# Patient Record
Sex: FEMALE | Race: WHITE | NOT HISPANIC OR LATINO | Employment: OTHER | URBAN - METROPOLITAN AREA
[De-identification: names, ages, dates, MRNs, and addresses within clinical notes are randomized per-mention and may not be internally consistent; named-entity substitution may affect disease eponyms.]

---

## 2017-02-02 RX ORDER — LEVOTHYROXINE SODIUM 100 UG/1
TABLET ORAL
Qty: 90 TABLET | Refills: 0 | Status: SHIPPED | OUTPATIENT
Start: 2017-02-02 | End: 2017-04-25 | Stop reason: SDUPTHER

## 2017-04-25 ENCOUNTER — TELEPHONE (OUTPATIENT)
Dept: FAMILY MEDICINE | Facility: CLINIC | Age: 62
End: 2017-04-25

## 2017-04-25 ENCOUNTER — OFFICE VISIT (OUTPATIENT)
Dept: FAMILY MEDICINE | Facility: CLINIC | Age: 62
End: 2017-04-25
Payer: COMMERCIAL

## 2017-04-25 VITALS
HEIGHT: 69 IN | TEMPERATURE: 98 F | SYSTOLIC BLOOD PRESSURE: 120 MMHG | RESPIRATION RATE: 17 BRPM | BODY MASS INDEX: 22.36 KG/M2 | WEIGHT: 151 LBS | OXYGEN SATURATION: 95 % | DIASTOLIC BLOOD PRESSURE: 86 MMHG | HEART RATE: 73 BPM

## 2017-04-25 DIAGNOSIS — F33.0 MAJOR DEPRESSIVE DISORDER, RECURRENT, MILD: Primary | ICD-10-CM

## 2017-04-25 DIAGNOSIS — Z12.11 SCREEN FOR COLON CANCER: ICD-10-CM

## 2017-04-25 DIAGNOSIS — F51.01 PRIMARY INSOMNIA: ICD-10-CM

## 2017-04-25 DIAGNOSIS — E03.9 HYPOTHYROIDISM, UNSPECIFIED TYPE: ICD-10-CM

## 2017-04-25 DIAGNOSIS — R53.83 FATIGUE, UNSPECIFIED TYPE: ICD-10-CM

## 2017-04-25 DIAGNOSIS — E78.5 HYPERLIPIDEMIA, UNSPECIFIED HYPERLIPIDEMIA TYPE: ICD-10-CM

## 2017-04-25 DIAGNOSIS — Z79.890 POSTMENOPAUSAL HRT (HORMONE REPLACEMENT THERAPY): ICD-10-CM

## 2017-04-25 DIAGNOSIS — K21.9 GASTROESOPHAGEAL REFLUX DISEASE, ESOPHAGITIS PRESENCE NOT SPECIFIED: ICD-10-CM

## 2017-04-25 DIAGNOSIS — R06.02 SHORTNESS OF BREATH: ICD-10-CM

## 2017-04-25 DIAGNOSIS — E04.2 MULTINODULAR GOITER: ICD-10-CM

## 2017-04-25 DIAGNOSIS — D69.6 THROMBOCYTOPENIA: ICD-10-CM

## 2017-04-25 PROCEDURE — 93005 ELECTROCARDIOGRAM TRACING: CPT | Mod: S$GLB,,, | Performed by: INTERNAL MEDICINE

## 2017-04-25 PROCEDURE — 93010 ELECTROCARDIOGRAM REPORT: CPT | Mod: ,,, | Performed by: INTERNAL MEDICINE

## 2017-04-25 PROCEDURE — 1160F RVW MEDS BY RX/DR IN RCRD: CPT | Mod: S$GLB,,, | Performed by: INTERNAL MEDICINE

## 2017-04-25 PROCEDURE — 99214 OFFICE O/P EST MOD 30 MIN: CPT | Mod: S$GLB,,, | Performed by: INTERNAL MEDICINE

## 2017-04-25 RX ORDER — BUPROPION HYDROCHLORIDE 300 MG/1
300 TABLET ORAL DAILY
Qty: 90 TABLET | Refills: 3 | Status: SHIPPED | OUTPATIENT
Start: 2017-04-25 | End: 2017-09-19 | Stop reason: SDUPTHER

## 2017-04-25 RX ORDER — TRAZODONE HYDROCHLORIDE 50 MG/1
50 TABLET ORAL NIGHTLY
Qty: 30 TABLET | Refills: 11 | Status: SHIPPED | OUTPATIENT
Start: 2017-04-25 | End: 2017-05-15

## 2017-04-25 RX ORDER — LEVOTHYROXINE SODIUM 100 UG/1
100 TABLET ORAL DAILY
Qty: 90 TABLET | Refills: 3 | Status: SHIPPED | OUTPATIENT
Start: 2017-04-25 | End: 2017-09-19 | Stop reason: SDUPTHER

## 2017-04-25 RX ORDER — ESTRADIOL 0.07 MG/D
PATCH, EXTENDED RELEASE TRANSDERMAL
Qty: 24 PATCH | Refills: 0 | Status: SHIPPED | OUTPATIENT
Start: 2017-04-25 | End: 2017-06-29 | Stop reason: SDUPTHER

## 2017-04-25 NOTE — PROGRESS NOTES
Subjective:       Patient ID: Katerina Dickson is a 61 y.o. female.    Medication List with Changes/Refills   New Medications    TRAZODONE (DESYREL) 50 MG TABLET    Take 1 tablet (50 mg total) by mouth every evening.   Current Medications    DOXYLAMINE SUCCINATE (UNISOM ORAL)    Take 2-50 mg by mouth every evening. 2 tablets nightly    DUREZOL 0.05 % DROP OPHTHALMIC SOLUTION        LACTOBACILLUS RHAMNOSUS GG (CULTURELLE) 10 BILLION CELL CAPSULE    Take 1 capsule by mouth once daily.    VIVELLE-DOT 0.075 MG/24 HR    APPLY 1 PATCH EXTERNALLY TO THE SKIN 2 TIMES A WEEK   Changed and/or Refilled Medications    Modified Medication Previous Medication    BUPROPION (WELLBUTRIN XL) 300 MG 24 HR TABLET buPROPion (WELLBUTRIN XL) 300 MG 24 hr tablet       Take 1 tablet (300 mg total) by mouth once daily.    TAKE 1 TABLET BY MOUTH ONCE DAILY.    SYNTHROID 100 MCG TABLET SYNTHROID 100 mcg tablet       Take 1 tablet (100 mcg total) by mouth once daily.    TAKE 1 TABLET BY MOUTH EVERY DAY       Chief Complaint: Annual Exam and Medication Refill, synthroid, wellbutrin  She is here to f/u on chronic medical issues.      She has depression that has been controlled since 2005 on wellbutrin. She is doing well and tolerating medication well. She does have significant sleep issues and takes unisom x 2  every night to sleep. She has trouble falling asleep.      She has hypothyroid and is taking synthroid. Her last TSH was normal on 7/2016. She had a thyroid u/s done on 4/2016 that showed small nodules unchanged from u/s done on 2013.    She has elevated lipids but is not on treatment. Her last lipids were checked on 8/2016 were 257/113/60/174.     She is taking HRT patches since her RUI in 1995. She is managed by gyn.      She has GERD and has been taking prevacid for many years. She had her last EGD in 2007. She does complain of pain in her throat intermittently but no burning. No choking of food or trouble swallowing. Pain is located at  her upper sternum area. Nothing makes it worse or better. It is only intermittent and no known triggers. She also complain of feeling full and slow digestion. She is constipated and has bowel movement about every 3-4 days. She will have intermittent diarrhea if she waits too long to stool. No blood in stool. She feels like her food stays in her stomach for a very long time. No vomiting or nausea.      She does complain of a 10 lb weight gain over the last year despite careful eating and calorie counting. She has increased her exercising and still is having trouble loosing the weight. This concerns her greatly.      She does complain of shortness of breath.  She says she is very fatigued and gets winded easily over the last 6 months. She can not do as much as she could 6 months ago. No chest pain or palpitations. No swelling in her legs.  She is able to exercise but not as well as 6 months ago. No fevers.  No recent illnesses. No coughing or wheezing.     She was noted to have very low platelets that have now normalized. She was followed by hematology who feel that the platelets were artificially low due to clumping in the vial before processing. She f/u with hematology in June.       She has a history or chronic pancreatitis that required hospitalization x 3. Her last episode was in 2006 where she was hospitalized for one month and had gallbladder removed. She has not had episode since. She says she presented with fullness and weight gain then eventually pain. She would like to check her pancreas levels.      Colonoscopy---2012 repeat in 5 years  Mammogram----3/2016 neg  DEXA-----4/2016 nl  Pap-----RUI  Tdap---3/2014  Influenza vaccine---none  Shingles vaccine-----none  Lipids 9/2015-----216/110/57/137    Review of Systems   Constitutional: Positive for fatigue. Negative for appetite change, fever and unexpected weight change.   HENT: Negative for congestion, ear pain, hearing loss, sore throat and trouble  "swallowing.    Eyes: Negative for pain and visual disturbance.   Respiratory: Positive for shortness of breath. Negative for cough, chest tightness and wheezing.    Cardiovascular: Negative for chest pain, palpitations and leg swelling.   Gastrointestinal: Negative for abdominal pain, blood in stool, constipation, diarrhea, nausea and vomiting.   Endocrine: Negative for polyuria.   Genitourinary: Negative for dysuria and hematuria.   Musculoskeletal: Positive for arthralgias. Negative for back pain and myalgias.   Skin: Negative for rash.   Neurological: Negative for dizziness, weakness, numbness and headaches.   Hematological: Does not bruise/bleed easily.   Psychiatric/Behavioral: Positive for sleep disturbance. Negative for dysphoric mood and suicidal ideas. The patient is not nervous/anxious.        Objective:      Vitals:    04/25/17 1226   BP: 120/86   BP Location: Left arm   Patient Position: Sitting   BP Method: Manual   Pulse: 73   Resp: 17   Temp: 98.3 °F (36.8 °C)   TempSrc: Oral   SpO2: 95%   Weight: 68.5 kg (151 lb 0.2 oz)   Height: 5' 9" (1.753 m)     Body mass index is 22.3 kg/(m^2).  Physical Exam    General appearance: No acute distress, cooperative  Eyes: PERRL, EOMI, conjunctiva clear  Ears: normal external ear and pinna, tm clear without drainage, canals clear  Nose: Normal mucosa without drainage  Throat: no exudates or erythema, tonsils not enlarged  Mouth: no sores or lesions, moist mucous membranes, good dentition  Neck: FROM, soft, supple, no thyromegaly, no bruits  Lymph: no anterior or posterior cervical adenopathy  Heart::  Regular rate and rhythm, no murmur  Lung: Clear to ascultation bilaterally, no wheezing, no rales, no rhonchi, no distress  Abdomen: Soft, nontender, no distention, no hepatosplenomegaly, bowel sounds normal, no guarding, no rebound, no peritoneal signs  Skin: no rashes, no lesions  Extremities: no edema, no cyanosis  Neuro: CN 2-12 intact, 5/5 muscle strength upper " and lower extremity bilaterally, 2+ DTRs UE and LE bilaterally, normal gait, normal sensation  Peripheral pulses: 2+ pedal pulses bilaterally, good perfusion and color  Musculoskeletal: FROM, good strenth, no tenderness  Joint: normal appearance, no swelling, no warmth, no deformity in all joints    Assessment:       1. Major depressive disorder, recurrent, mild    2. Primary insomnia    3. Hypothyroidism, unspecified type    4. Multinodular goiter    5. Hyperlipidemia, unspecified hyperlipidemia type    6. Gastroesophageal reflux disease, esophagitis presence not specified    7. Thrombocytopenia    8. Shortness of breath    9. Fatigue, unspecified type    10. Screen for colon cancer        Plan:       Major depressive disorder, recurrent, mild  Good control on this regimen.   -     buPROPion (WELLBUTRIN XL) 300 MG 24 hr tablet; Take 1 tablet (300 mg total) by mouth once daily.  Dispense: 90 tablet; Refill: 3    Primary insomnia  Uncontrolled. ADvised to stop unisom OTC and start trazodone once at night.   -     trazodone (DESYREL) 50 MG tablet; Take 1 tablet (50 mg total) by mouth every evening.  Dispense: 30 tablet; Refill: 11    Hypothyroidism, unspecified type  Good control in the past and will check TSH today.   -     TSH; Future; Expected date: 4/25/17  -     SYNTHROID 100 mcg tablet; Take 1 tablet (100 mcg total) by mouth once daily.  Dispense: 90 tablet; Refill: 3    Multinodular goiter  STable and will check a surveillance u/s.   -      Soft Tissue Head Neck Thyroid; Future; Expected date: 4/25/17    Hyperlipidemia, unspecified hyperlipidemia type  Noted to be elevated but she does not want treatment at this time. Will continue to follow and recheck at next appt.     Gastroesophageal reflux disease, esophagitis presence not specified  Stable and controlled on prevacid.     Thrombocytopenia  Artificially low due to clumping in collection vial per hematology. REcheck today.   -     CBC auto differential;  Future; Expected date: 4/25/17    Shortness of breath with fatigue  ? Etiology---will check stress echo and EKG done today is reassuring with NSR, nl axis, nl intervals, no acute ST-Twave abn, no q waves.  Will check CXR.  Will check some baseline labs.  If all normal consider checking PFTs.    -     Exercise stress echo; Future  -     X-Ray Chest PA And Lateral; Future; Expected date: 4/25/17  -     IN OFFICE EKG 12-LEAD (to [place])  -     Exercise stress echo; Future  -     Comprehensive metabolic panel; Future; Expected date: 4/25/17  -     Protein electrophoresis, serum; Future; Expected date: 4/25/17  -     PROTEIN ELECTROPHORESIS, URINE; Future    Screen for colon cancer  -     Case request GI: COLONOSCOPY    Return in about 6 months (around 10/25/2017) for chronic medical issues.

## 2017-04-25 NOTE — MR AVS SNAPSHOT
[facility]  [address]  [place] [address]  Phone: [phone]  Fax: [phone]                  Katerina Dickson   2017 12:00 PM   Office Visit    Description:  Female : 1955   Provider:  Kaitlyn Butler DO   Department:  [facility]           Reason for Visit     Annual Exam     Medication Refill, synthroid, wellbutrin           Diagnoses this Visit        Comments    Major depressive disorder, recurrent, mild    -  Primary     Primary insomnia         Hypothyroidism, unspecified type         Gastroesophageal reflux disease, esophagitis presence not specified         Thrombocytopenia         Cervical radiculopathy         Shortness of breath         Screen for colon cancer         Fatigue, unspecified type         Multinodular goiter                To Do List           Future Appointments        Provider Department Dept Phone    2017 9:30 AM [facility] US3 [facility] [phone]    2017 10:15 AM LAB, [place] [facility] [phone]    2017 10:30 AM [facility] XR2 [facility] [phone]    2017 1:45 PM ECHO, [facility] [phone]    2017 11:20 AM Carla Moore MD [facility] - [phone]      Goals (5 Years of Data)     None       These Medications        Disp Refills Start End    trazodone (DESYREL) 50 MG tablet 30 tablet 11 2017    Take 1 tablet (50 mg total) by mouth every evening. - Oral    Pharmacy: [facility] [address] [place] [address] [id number] [place] [address] [address] AT [facility] [address] Ph #: [phone]       buPROPion (WELLBUTRIN XL) 300 MG 24 hr tablet 90 tablet 3 2017     Take 1 tablet (300 mg total) by mouth once daily. - Oral    Pharmacy: [facility] [address] [place] [address] [id number] [place] [address] [address] AT [facility] [address] Ph #: [phone]       SYNTHROID 100 mcg tablet 90  tablet 3 4/25/2017     Take 1 tablet (100 mcg total) by mouth once daily. - Oral    Pharmacy: [facility] [address] [place] [address] [address] AT [facility] [address] Ph #: [phone]         [facility]     [facility] On Call Nurse Care Line - 24/7 Assistance  Unless otherwise directed by your provider, please contact [facility], our nurse care line that is available for 24/7 assistance.     Registered nurses in the [facility] provide: appointment scheduling, clinical advisement, health education, and other advisory services.  Call: [phone] (toll free)               Medications           Message regarding Medications     Verify the changes and/or additions to your medication regime listed below are the same as discussed with your clinician today.  If any of these changes or additions are incorrect, please notify your healthcare provider.        START taking these NEW medications        Refills    trazodone (DESYREL) 50 MG tablet 11    Sig: Take 1 tablet (50 mg total) by mouth every evening.    Class: Normal    Route: Oral      CHANGE how you are taking these medications     Start Taking Instead of    buPROPion (WELLBUTRIN XL) 300 MG 24 hr tablet buPROPion (WELLBUTRIN XL) 300 MG 24 hr tablet    Dosage:  Take 1 tablet (300 mg total) by mouth once daily. Dosage:  TAKE 1 TABLET BY MOUTH ONCE DAILY.    Reason for Change:  Reorder     SYNTHROID 100 mcg tablet SYNTHROID 100 mcg tablet    Dosage:  Take 1 tablet (100 mcg total) by mouth once daily. Dosage:  TAKE 1 TABLET BY MOUTH EVERY DAY    Reason for Change:  Reorder            Verify that the below list of medications is an accurate representation of the medications you are currently taking.  If none reported, the list may be blank. If incorrect, please contact your healthcare provider. Carry this list with you in case of emergency.           Current Medications     buPROPion (WELLBUTRIN XL) 300 MG 24 hr tablet Take 1  "tablet (300 mg total) by mouth once daily.    DOXYLAMINE SUCCINATE (UNISOM ORAL) Take 2-50 mg by mouth every evening. 2 tablets nightly    DUREZOL 0.05 % Drop ophthalmic solution     Lactobacillus rhamnosus GG (CULTURELLE) 10 billion cell capsule Take 1 capsule by mouth once daily.    SYNTHROID 100 mcg tablet Take 1 tablet (100 mcg total) by mouth once daily.    VIVELLE-DOT 0.075 mg/24 hr APPLY 1 PATCH EXTERNALLY TO THE SKIN 2 TIMES A WEEK    trazodone (DESYREL) 50 MG tablet Take 1 tablet (50 mg total) by mouth every evening.           Clinical Reference Information           Your Vitals Were     BP Pulse Temp Resp Height Weight    120/86 (BP Location: Left arm, Patient Position: Sitting, BP Method: Manual) 73 98.3 °F (36.8 °C) (Oral) 17 5' 9" (1.753 m) 68.5 kg (151 lb 0.2 oz)    SpO2 BMI             95% 22.3 kg/m2         Blood Pressure          Most Recent Value    BP  120/86      Allergies as of 4/25/2017     No Known Allergies      Immunizations Administered on Date of Encounter - 4/25/2017     None      Orders Placed During Today's Visit      Normal Orders This Visit    Case request GI: COLONOSCOPY     IN OFFICE EKG 12-LEAD (to [place])     Future Labs/Procedures Expected by Expires    CBC auto differential  4/25/2017 4/26/2018    Comprehensive metabolic panel  4/25/2017 4/26/2018    Protein electrophoresis, serum  4/25/2017 6/24/2018    TSH  4/25/2017 4/26/2018    US Soft Tissue Head Neck Thyroid  4/25/2017 4/25/2018    X-Ray Chest PA And Lateral  4/25/2017 4/25/2018    Exercise stress echo  As directed 4/25/2018    PROTEIN ELECTROPHORESIS, URINE  As directed 4/25/2018      [facility] Sign-Up     Activating your [facility] account is as easy as 1-2-3!     1) Visit [email or web address], select Sign Up Now, enter this activation code and your date of birth, then select Next.  Activation code not generated  Current Patient Portal Status: Account disabled      2) Create a username and password to use when you visit [facility] " in the future and select a security question in case you lose your password and select Next.    3) Enter your e-mail address and click Sign Up!    Additional Information  If you have questions, please e-mail [email or web address] or call [phone] to talk to our [facility] staff. Remember, [facility] is NOT to be used for urgent needs. For medical emergencies, dial 911.         Language Assistance Services     ATTENTION: Language assistance services are available, free of charge. Please call [phone].      ATENCIÓN: Si habla español, tiene a morin disposición servicios gratuitos de asistencia lingüística. Llame al [phone].     CHÚ Ý: N?u b?n nói Ti?ng Vi?t, có các d?ch v? h? tr? ngôn ng? mi?n phí dành cho b?n. G?i s? [phone].         [facility] complies with applicable Federal civil rights laws and does not discriminate on the basis of race, color, national origin, age, disability, or sex.

## 2017-04-27 NOTE — TELEPHONE ENCOUNTER
Attempted to reach patient via contact numbers provided, no answer. LM including information regarding OTC aquaphor BID to area, included contact information for return call/ questions.

## 2017-05-01 ENCOUNTER — TELEPHONE (OUTPATIENT)
Dept: FAMILY MEDICINE | Facility: CLINIC | Age: 62
End: 2017-05-01

## 2017-05-01 NOTE — TELEPHONE ENCOUNTER
Financial Call Center has not been able to contact patient.        Pt has a responsibility  due $340.00__.       Is this procedure medically urgent or non-medically ?       Please respond via In-basket or contact [facility] @ [phone].      Regarding echo

## 2017-05-01 NOTE — TELEPHONE ENCOUNTER
In regards to below message, Dr. Butler clarified procedure IS medically urgent pertaining to patients ECHO, disregard ultrasound information. Notified April with [facility] of medical urgency, verbalized understanding.

## 2017-05-01 NOTE — TELEPHONE ENCOUNTER
This is a surveillance u/s to check her multiple nodules. This could wait to be done in 2018.

## 2017-05-04 ENCOUNTER — HOSPITAL ENCOUNTER (OUTPATIENT)
Dept: RADIOLOGY | Facility: HOSPITAL | Age: 62
Discharge: HOME | End: 2017-05-04
Attending: INTERNAL MEDICINE
Payer: COMMERCIAL

## 2017-05-04 ENCOUNTER — CLINICAL SUPPORT (OUTPATIENT)
Dept: CARDIOLOGY | Facility: CLINIC | Age: 62
End: 2017-05-04
Payer: COMMERCIAL

## 2017-05-04 DIAGNOSIS — R06.02 SHORTNESS OF BREATH: ICD-10-CM

## 2017-05-04 DIAGNOSIS — R53.83 FATIGUE, UNSPECIFIED TYPE: ICD-10-CM

## 2017-05-04 DIAGNOSIS — E04.2 MULTINODULAR GOITER: ICD-10-CM

## 2017-05-04 PROCEDURE — 93351 STRESS TTE COMPLETE: CPT | Mod: S$GLB,,, | Performed by: INTERNAL MEDICINE

## 2017-05-04 PROCEDURE — 76536 US EXAM OF HEAD AND NECK: CPT | Mod: TC,PO

## 2017-05-04 PROCEDURE — 76536 US EXAM OF HEAD AND NECK: CPT | Mod: 26,,, | Performed by: RADIOLOGY

## 2017-05-04 PROCEDURE — 71020 XR CHEST PA AND LATERAL: CPT | Mod: TC,PO

## 2017-05-04 PROCEDURE — 93321 DOPPLER ECHO F-UP/LMTD STD: CPT | Mod: S$GLB,,, | Performed by: INTERNAL MEDICINE

## 2017-05-04 PROCEDURE — 71020 XR CHEST PA AND LATERAL: CPT | Mod: 26,,, | Performed by: RADIOLOGY

## 2017-05-05 LAB
DIASTOLIC DYSFUNCTION: NO
MITRAL VALVE MOBILITY: NORMAL
RETIRED EF AND QEF - SEE NOTES: 55 (ref 55–65)

## 2017-05-12 ENCOUNTER — TELEPHONE (OUTPATIENT)
Dept: FAMILY MEDICINE | Facility: CLINIC | Age: 62
End: 2017-05-12

## 2017-05-12 DIAGNOSIS — R06.09 DOE (DYSPNEA ON EXERTION): Primary | ICD-10-CM

## 2017-05-12 DIAGNOSIS — R94.39 ABNORMAL STRESS ECG: ICD-10-CM

## 2017-05-12 NOTE — TELEPHONE ENCOUNTER
Appt scheduled, patient aware. States trazodone is not helping with sleep issues, patient goes to sleep, wakes up and can't fall back asleep. Please advise if any additional instructions.

## 2017-05-12 NOTE — TELEPHONE ENCOUNTER
I called her with the results of her labs and imaging. All look reassuring except for her stress test that was positive on EKG portion but negative on echo portion.    Will refer to cardiology.  Please schedule and call her with appt.     Thanks.

## 2017-05-15 ENCOUNTER — TELEPHONE (OUTPATIENT)
Dept: CARDIOLOGY | Facility: CLINIC | Age: 62
End: 2017-05-15

## 2017-05-15 ENCOUNTER — TELEPHONE (OUTPATIENT)
Dept: GASTROENTEROLOGY | Facility: CLINIC | Age: 62
End: 2017-05-15

## 2017-05-15 ENCOUNTER — OFFICE VISIT (OUTPATIENT)
Dept: CARDIOLOGY | Facility: CLINIC | Age: 62
End: 2017-05-15
Payer: COMMERCIAL

## 2017-05-15 VITALS
DIASTOLIC BLOOD PRESSURE: 74 MMHG | BODY MASS INDEX: 22.08 KG/M2 | SYSTOLIC BLOOD PRESSURE: 121 MMHG | WEIGHT: 149.06 LBS | HEART RATE: 66 BPM | HEIGHT: 69 IN

## 2017-05-15 DIAGNOSIS — Z01.812 PRE-PROCEDURE LAB EXAM: Primary | ICD-10-CM

## 2017-05-15 DIAGNOSIS — D69.6 THROMBOCYTOPENIA: ICD-10-CM

## 2017-05-15 DIAGNOSIS — R94.39 ABNORMAL STRESS TEST: ICD-10-CM

## 2017-05-15 DIAGNOSIS — R06.09 DOE (DYSPNEA ON EXERTION): ICD-10-CM

## 2017-05-15 DIAGNOSIS — Z79.01 CHRONIC ANTICOAGULATION: ICD-10-CM

## 2017-05-15 PROCEDURE — 99999 PR PBB SHADOW E&M-EST. PATIENT-LVL III: CPT | Mod: PBBFAC,,, | Performed by: INTERNAL MEDICINE

## 2017-05-15 PROCEDURE — 99204 OFFICE O/P NEW MOD 45 MIN: CPT | Mod: S$GLB,,, | Performed by: INTERNAL MEDICINE

## 2017-05-15 PROCEDURE — 1160F RVW MEDS BY RX/DR IN RCRD: CPT | Mod: S$GLB,,, | Performed by: INTERNAL MEDICINE

## 2017-05-15 RX ORDER — NORTRIPTYLINE HYDROCHLORIDE 10 MG/1
CAPSULE ORAL
Qty: 60 CAPSULE | Refills: 11 | Status: SHIPPED | OUTPATIENT
Start: 2017-05-15 | End: 2017-06-29

## 2017-05-15 NOTE — TELEPHONE ENCOUNTER
Please call her and let her know that I did see cardiology's note and know she is getting an angiogram.     Also I want her to stop trazodone.     Start nortriptyline 1 tab qhs x 1 week then increase to 2 tab qhs.     Thanks.

## 2017-05-15 NOTE — PROGRESS NOTES
Subjective:    Patient ID:  Katerina Dickson is a 61 y.o. female who presents for evaluation of new pt (new pt); Abnormal Stress Test; and Shortness of Breath      HPI 60 yo WF who has been exercising for several months but recently has had change in exercise tolerance. She notices a definite change with SOB to the point she quit exercising. Noticed SOB walking up the stairs to the office today. Denies palpitations, weak spells, and syncope. Had stress test read as below. Reviewed EKG and feel the EKG was definitely abnormal.    At peak exercise, EKG revealed < 1mm of downsloping ST segment depression in the inferolateral leads.     EKG Conclusions:    1. The EKG portion of this study is suspicious for ischemia at a moderate workload, and peak heart rate of 133 bpm (87% of predicted).   2. Exercise capacity is average.   3. Blood pressure response to exercise was normal (Presenting BP: 122/83 Peak BP: 140/83).   4. No significant arrhythmias were present.   5. There were no symptoms of chest discomfort or significant dyspnea throughout the protocol.   6. The Duke treadmill score was 4 suggesting     No exercise induced wall motion abnormalities were identified.       CONCLUSIONS     1 - Normal left ventricular systolic function (EF 55-60%).     2 - Normal left ventricular diastolic function.     No evidence of stress induced myocardial ischemia.         This document has been electronically    SIGNED BY: Ilan De MD On: 05/05/2017 07:50    Review of Systems   Constitution: Negative for decreased appetite, fever, weakness, malaise/fatigue, weight gain and weight loss.   HENT: Negative for headaches, hearing loss and nosebleeds.    Eyes: Negative for visual disturbance.   Cardiovascular: Positive for dyspnea on exertion. Negative for chest pain, claudication, cyanosis, irregular heartbeat, leg swelling, near-syncope, orthopnea, palpitations, paroxysmal nocturnal dyspnea and syncope.   Respiratory: Positive for  "shortness of breath. Negative for cough, hemoptysis, sleep disturbances due to breathing, snoring and wheezing.    Endocrine: Negative for cold intolerance, heat intolerance, polydipsia and polyuria.   Hematologic/Lymphatic: Negative for adenopathy and bleeding problem. Does not bruise/bleed easily.   Skin: Negative for color change, itching, poor wound healing, rash and suspicious lesions.   Musculoskeletal: Negative for arthritis, back pain, falls, joint pain, joint swelling, muscle cramps, muscle weakness and myalgias.   Gastrointestinal: Negative for bloating, abdominal pain, change in bowel habit, constipation, flatus, heartburn, hematemesis, hematochezia, hemorrhoids, jaundice, melena, nausea and vomiting.   Genitourinary: Negative for bladder incontinence, decreased libido, frequency, hematuria, hesitancy and urgency.   Neurological: Negative for brief paralysis, difficulty with concentration, excessive daytime sleepiness, dizziness, focal weakness, light-headedness, loss of balance, numbness and vertigo.   Psychiatric/Behavioral: Negative for altered mental status, depression and memory loss. The patient does not have insomnia and is not nervous/anxious.    Allergic/Immunologic: Negative for environmental allergies, hives and persistent infections.        Objective:    Physical Exam   Constitutional: She is oriented to person, place, and time. She appears well-developed and well-nourished. No distress.   /74  Pulse 66  Ht 5' 9" (1.753 m)  Wt 67.6 kg (149 lb 0.5 oz)  BMI 22.01 kg/m2     HENT:   Head: Normocephalic and atraumatic.   Right Ear: External ear normal.   Left Ear: External ear normal.   Nose: Nose normal.   Mouth/Throat: Oropharynx is clear and moist.   Eyes: Conjunctivae, EOM and lids are normal. Pupils are equal, round, and reactive to light. Right eye exhibits no discharge. Left eye exhibits no discharge. Right conjunctiva has no hemorrhage. No scleral icterus.   Neck: Normal range of " motion. Neck supple. No JVD present. No tracheal deviation present. No thyromegaly present.   Cardiovascular: Normal rate, regular rhythm, S1 normal, S2 normal, normal heart sounds and intact distal pulses.  Exam reveals no gallop and no friction rub.    No murmur heard.  Pulses:       Carotid pulses are 2+ on the right side, and 2+ on the left side.       Radial pulses are 2+ on the right side, and 2+ on the left side.        Femoral pulses are 2+ on the right side, and 2+ on the left side.       Popliteal pulses are 2+ on the right side, and 2+ on the left side.        Dorsalis pedis pulses are 2+ on the right side, and 2+ on the left side.        Posterior tibial pulses are 2+ on the right side, and 2+ on the left side.   Pulmonary/Chest: Effort normal and breath sounds normal. No respiratory distress. She has no wheezes. She has no rales. She exhibits no tenderness. Breasts are symmetrical.   Abdominal: Soft. Bowel sounds are normal. She exhibits no distension and no mass. There is no hepatosplenomegaly or hepatomegaly. There is no tenderness. There is no rebound and no guarding.   Musculoskeletal: Normal range of motion. She exhibits no edema or tenderness.   Lymphadenopathy:     She has no cervical adenopathy.   Neurological: She is alert and oriented to person, place, and time. She has normal reflexes. No cranial nerve deficit. Coordination normal.   Skin: Skin is warm and dry. No rash noted. She is not diaphoretic. No erythema. No pallor.   Psychiatric: She has a normal mood and affect. Her speech is normal and behavior is normal. Judgment and thought content normal. Cognition and memory are normal.   Nursing note and vitals reviewed.        Assessment:       1. Abnormal stress test    2. YO (dyspnea on exertion)         Plan:     Discussed alternatives with patient including alternative stress test vs cath and she opts for cath. With exertional symptoms and abnormal EKG feel this is reasonable   Will set  up cath with Dr Montes De Oca   .No orders of the defined types were placed in this encounter.    Return in about 3 months (around 8/15/2017).

## 2017-05-15 NOTE — TELEPHONE ENCOUNTER
Please reach out to patient to reschedule colonoscopy. Patient had abnormal stress test and has been scheduled for an angiogram on Monday, May 22nd.     Please msg me back that you got this msg. Patient is aware of scheduled angiogram and needs you to reschedule colonoscopy.   Thank you    Marcy England LPN

## 2017-05-15 NOTE — TELEPHONE ENCOUNTER
Spoke with Dr Gamble's office. They are aware pt has LHC scheduled & they need to reschedule colonoscopy.

## 2017-05-15 NOTE — TELEPHONE ENCOUNTER
----- Message from Angela Rooney sent at 5/15/2017  3:48 PM CDT -----  Contact: self [phone]  Call placed to pod. Patient returned your call, please call back.  Thank you!

## 2017-05-15 NOTE — PATIENT INSTRUCTIONS
Angiogram    Arrive for procedure at: [facility], Main Entrance, 1st Floor Admit Desk at 7am on Friday, June 30, 2017. Procedure is at 9:00 am.    You will receive a phone call from [facility] Pre-Op Department with further instructions prior to your scheduled procedure.    Notify the nurse if you are ALLERGIC TO IODINE.    FASTING: You MAY NOT have anything to eat or drink AFTER MIDNIGHT the day before your procedure. If your procedure is scheduled in the afternoon, you may have a LIGHT BREAKFAST 6-8 hours prior to your procedure.  For example: Two slices of toast; black coffee or black tea.    MEDICATIONS: You may take your regular morning medications with water. If there are any medications that you should not take, you will be instructed to hold them for that morning.    If you take any of the following:    ? CARDIOLOGY PRE-PROCEDURE MEDICATION ORDERS:  ** Please hold any medications that are checked below:    HOLD   # OF DAYS TO HOLD  ? Coumadin   Consult with Coumadin Clinic   ? Xarelto    _DAY BEFORE & DAY OF_  ? Pradaxa  _ DAY BEFORE & DAY OF _  ? Eliquis   _ DAY BEFORE & DAY OF _  ? Metformin    Day before procedure & morning of procedure  ? Short acting insulin   Morning of procedure    You can CONTINUE the Following Medications if you are taking them:     ? Plavix      ? Effient     ? Aspirin      WHAT TO EXPECT:    How long will the procedure take?  The procedure will take an average of 1 - 2 hours to perform.  After the procedure, you will need to lay flat for around 4 - 6 hours to minimize bleeding from the puncture site. If the wrist is accessed you will need to keep your arm still as instructed by the nurse.    When can I go home?  You may be able to be discharged home that same afternoon if there were no complications.  If you have one of the following: balloon; stent; pacemaker or defibrillator procedures, you may spend one night for observation.  Your doctor  will determine your discharge based upon your progress.  The results of your procedure will be discussed with you before you are discharged.  Any further testing or procedures will be scheduled for you either before you leave or you will be instructed to call for a future appointment.      TRANSPORTATION:  PLEASE ARRANGE TO HAVE SOMEONE DRIVE YOU HOME FOLLOWING YOUR PROCEDURE, YOU WILL NOT BE ALLOWED TO DRIVE.

## 2017-05-15 NOTE — LETTER
May 15, 2017      Kaitlyn Butler DO  [address] [address]  [address]  [facility] [address]           [facility]  [address] [facility] [address] [place] [address]  Phone: [phone]          Patient: Katerina Dickson   MR Number: [id number]   YOB: 1955   Date of Visit: 5/15/2017       Dear Dr. Kaitlyn Butler:    Thank you for referring Katerina Dickson to me for evaluation. Attached you will find relevant portions of my assessment and plan of care.    If you have questions, please do not hesitate to call me. I look forward to following Katerina Dickson along with you.    Sincerely,    Mick Ng Jr., MD    Enclosure  CC:  No Recipients    If you would like to receive this communication electronically, please contact [email or web address] or [phone] to request more information on [employer] Link access.    For providers and/or their staff who would like to refer a patient to [facility], please contact us through our one-stop-shop provider referral line, [facility], at [phone].    If you feel you have received this communication in error or would no longer like to receive these types of communications, please e-mail [email or web address]

## 2017-05-15 NOTE — MR AVS SNAPSHOT
[facility]  [address] [facility] [address] [place] [address]  Phone: [phone]                  Katerina Dickson   5/15/2017 11:30 AM   Office Visit    Description:  Female : 1955   Provider:  Mick Ng Jr., MD   Department:  [facility]           Reason for Visit     new pt     Abnormal Stress Test     Shortness of Breath           Diagnoses this Visit        Comments    Abnormal stress test         YO (dyspnea on exertion)                To Do List           Future Appointments        Provider Department Dept Phone    2017 11:20 AM Carla Moore MD [facility] [phone]    2017 9:30 AM LAB, [facility] DRAW STATION [facility] - Laboratory [phone]    2017 10:20 AM Rona Rao MD [facility] - Hematology [phone]    10/25/2017 11:00 AM Kaitlyn Butler DO [facility] [phone]      Your Future Surgeries/Procedures     May 22, 2017   Surgery with Pankaj Shah MD   [facility] ([facility] [place]    [address] [facility] [address] [place] [address] [phone]              Goals (5 Years of Data)     None      Follow-Up and Disposition     Return in about 3 months (around 8/15/2017).    Follow-up and Disposition History      [facility]     [facility] Nurse Care Line -  Assistance  Unless otherwise directed by your provider, please contact [facility], our nurse care line that is available for  assistance.     Registered nurses in the [facility] provide: appointment scheduling, clinical advisement, health education, and other advisory services.  Call: [phone] (toll free)               Medications           Message regarding Medications     Verify the changes and/or additions to your medication regime listed below are the same as discussed with your clinician today.  If any of these changes or additions are incorrect, please notify your healthcare  "provider.             Verify that the below list of medications is an accurate representation of the medications you are currently taking.  If none reported, the list may be blank. If incorrect, please contact your healthcare provider. Carry this list with you in case of emergency.           Current Medications     buPROPion (WELLBUTRIN XL) 300 MG 24 hr tablet Take 1 tablet (300 mg total) by mouth once daily.    DOXYLAMINE SUCCINATE (UNISOM ORAL) Take 2-50 mg by mouth every evening. 2 tablets nightly    DUREZOL 0.05 % Drop ophthalmic solution     Lactobacillus rhamnosus GG (CULTURELLE) 10 billion cell capsule Take 1 capsule by mouth once daily.    SYNTHROID 100 mcg tablet Take 1 tablet (100 mcg total) by mouth once daily.    trazodone (DESYREL) 50 MG tablet Take 1 tablet (50 mg total) by mouth every evening.    VIVELLE-DOT 0.075 mg/24 hr APPLY 1 PATCH EXTERNALLY TO THE SKIN 2 TIMES A WEEK           Clinical Reference Information           Your Vitals Were     BP Pulse Height Weight BMI    121/74 66 5' 9" (1.753 m) 67.6 kg (149 lb 0.5 oz) 22.01 kg/m2      Blood Pressure          Most Recent Value    BP  121/74      Allergies as of 5/15/2017     No Known Allergies      Immunizations Administered on Date of Encounter - 5/15/2017     None      Instructions    Angiogram    Arrive for procedure at: [facility], Main Entrance, 1st Floor Admit Desk at 8am on Monday, May 22, 2017. Procedure is at 10:00 am.    You will receive a phone call from [facility] Pre-Op Department with further instructions prior to your scheduled procedure.    Notify the nurse if you are ALLERGIC TO IODINE.    FASTING: You MAY NOT have anything to eat or drink AFTER MIDNIGHT the day before your procedure. If your procedure is scheduled in the afternoon, you may have a LIGHT BREAKFAST 6-8 hours prior to your procedure.  For example: Two slices of toast; black coffee or black tea.    MEDICATIONS: You may take your " regular morning medications with water. If there are any medications that you should not take, you will be instructed to hold them for that morning.    ? CARDIOLOGY PRE-PROCEDURE MEDICATION ORDERS:  ** Please hold any medications that are checked below:    HOLD   # OF DAYS TO HOLD  ? Coumadin   Consult with Coumadin Clinic   ? Xarelto    _DAY BEFORE & DAY OF_  ? Pradaxa  _ DAY BEFORE & DAY OF _  ? Eliquis   _ DAY BEFORE & DAY OF _  ? Metformin    Day before procedure & morning of procedure  ? Short acting insulin   Morning of procedure    CONTINUE the Following Medications   ? Plavix      ? Effient     ? Aspirin        WHAT TO EXPECT:    How long will the procedure take?  The procedure will take an average of 1 - 2 hours to perform.  After the procedure, you will need to lay flat for around 4 - 6 hours to minimize bleeding from the puncture site. If the wrist is accessed you will need to keep your arm still as instructed by the nurse.    When can I go home?  You may be able to be discharged home that same afternoon if there were no complications.  If you have one of the following: balloon; stent; pacemaker or defibrillator procedures, you may spend one night for observation.  Your doctor will determine your discharge based upon your progress.  The results of your procedure will be discussed with you before you are discharged.  Any further testing or procedures will be scheduled for you either before you leave or you will be instructed to call for a future appointment.      TRANSPORTATION:  PLEASE ARRANGE TO HAVE SOMEONE DRIVE YOU HOME FOLLOWING YOUR PROCEDURE, YOU WILL NOT BE ALLOWED TO DRIVE.             Language Assistance Services     ATTENTION: Language assistance services are available, free of charge. Please call [phone].      ATENCIÓN: Si habla weston, tiene a morin disposición servicios gratuitos de asistencia lingüística. Alicia al [phone].     CHÚ Ý: N?u b?n nói Ti?ng Vi?t, có các d?ch v? h?  tr? andres mckeon? mi?n phí dành cho b?n. G?i s? [phone].         [facility] complies with applicable Federal civil rights laws and does not discriminate on the basis of race, color, national origin, age, disability, or sex.

## 2017-05-16 ENCOUNTER — TELEPHONE (OUTPATIENT)
Dept: CARDIOLOGY | Facility: CLINIC | Age: 62
End: 2017-05-16

## 2017-05-16 NOTE — TELEPHONE ENCOUNTER
Spoke with patient. States she cannot afford to do the angiogram right now.  Insurance will pay the Doctor's fees but not hospital bill at $14,000 +.    She is cancelling the angiogram until she can work something out.  Insurance will pay hospital bill if pt actually has a heart attack.

## 2017-05-16 NOTE — TELEPHONE ENCOUNTER
----- Message from Danilo Castillo sent at 5/16/2017  3:27 PM CDT -----  Contact: Patient  Patient states to please cancel the procedure scheduled for 05/22/2017 and she will call back to re-schedule.  Patient states that she is having problems with the insurance.  Any questions, please call [phone].  Thank you

## 2017-05-17 ENCOUNTER — TELEPHONE (OUTPATIENT)
Dept: FAMILY MEDICINE | Facility: CLINIC | Age: 62
End: 2017-05-17

## 2017-05-17 NOTE — TELEPHONE ENCOUNTER
----- Message from Danilo Castillo sent at 5/16/2017  3:25 PM CDT -----  Contact: Patient  Patient states to please cancel the procedure for the colonoscopy because of insurance problems.  And will call back to re-schedule.  Any questions, please call [phone].  Thank you

## 2017-05-17 NOTE — TELEPHONE ENCOUNTER
See pt's msg.  Pt requesting to cancel the colonoscopy due to insurance issue.  Please take pt off your schedule.

## 2017-06-20 ENCOUNTER — TELEPHONE (OUTPATIENT)
Dept: FAMILY MEDICINE | Facility: CLINIC | Age: 62
End: 2017-06-20

## 2017-06-20 NOTE — TELEPHONE ENCOUNTER
Pt stated she is having headaches due to her SOB.  Her angiogram was canceled due to her insurance.  She now has new insurance and ready to schedule the Angiogram.   Please call pt and [facility].

## 2017-06-20 NOTE — TELEPHONE ENCOUNTER
----- Message from John Frye sent at 6/20/2017 10:52 AM CDT -----  Contact: Self  [phone]  Patient needs an earlier appointment to see the doctor for headaches. Thanks!

## 2017-06-23 ENCOUNTER — TELEPHONE (OUTPATIENT)
Dept: CARDIOLOGY | Facility: CLINIC | Age: 62
End: 2017-06-23

## 2017-06-23 ENCOUNTER — PATIENT MESSAGE (OUTPATIENT)
Dept: CARDIOLOGY | Facility: CLINIC | Age: 62
End: 2017-06-23

## 2017-06-23 DIAGNOSIS — E03.9 HYPOTHYROIDISM, UNSPECIFIED TYPE: ICD-10-CM

## 2017-06-23 DIAGNOSIS — R06.09 DOE (DYSPNEA ON EXERTION): Primary | ICD-10-CM

## 2017-06-23 DIAGNOSIS — E78.5 HYPERLIPIDEMIA, UNSPECIFIED HYPERLIPIDEMIA TYPE: ICD-10-CM

## 2017-06-23 DIAGNOSIS — D69.6 THROMBOCYTOPENIA: ICD-10-CM

## 2017-06-23 DIAGNOSIS — R94.39 ABNORMAL STRESS TEST: ICD-10-CM

## 2017-06-23 DIAGNOSIS — Z79.01 CHRONIC ANTICOAGULATION: ICD-10-CM

## 2017-06-23 DIAGNOSIS — Z01.812 PRE-PROCEDURE LAB EXAM: ICD-10-CM

## 2017-06-23 NOTE — TELEPHONE ENCOUNTER
Spoke with patient and scheduled appt for angiogram for Friday, June 30th, at 9:am. Pre-procedure instructions sent to pt's [facility] pt portal for printing.  Pt aware labs will be drawn at hospital same day.

## 2017-06-29 ENCOUNTER — HOSPITAL ENCOUNTER (OUTPATIENT)
Dept: RADIOLOGY | Facility: HOSPITAL | Age: 62
Discharge: HOME | End: 2017-06-29
Attending: OBSTETRICS & GYNECOLOGY
Payer: COMMERCIAL

## 2017-06-29 ENCOUNTER — OFFICE VISIT (OUTPATIENT)
Dept: OBSTETRICS AND GYNECOLOGY | Facility: CLINIC | Age: 62
End: 2017-06-29
Payer: COMMERCIAL

## 2017-06-29 VITALS — HEIGHT: 69 IN | WEIGHT: 147 LBS | BODY MASS INDEX: 21.77 KG/M2

## 2017-06-29 VITALS
WEIGHT: 147.94 LBS | HEIGHT: 69 IN | SYSTOLIC BLOOD PRESSURE: 118 MMHG | DIASTOLIC BLOOD PRESSURE: 64 MMHG | BODY MASS INDEX: 21.91 KG/M2

## 2017-06-29 DIAGNOSIS — Z01.419 ENCOUNTER FOR GYNECOLOGICAL EXAMINATION WITHOUT ABNORMAL FINDING: Primary | ICD-10-CM

## 2017-06-29 DIAGNOSIS — Z12.31 VISIT FOR SCREENING MAMMOGRAM: ICD-10-CM

## 2017-06-29 DIAGNOSIS — Z79.890 POSTMENOPAUSAL HRT (HORMONE REPLACEMENT THERAPY): ICD-10-CM

## 2017-06-29 PROCEDURE — 99396 PREV VISIT EST AGE 40-64: CPT | Mod: S$GLB,,, | Performed by: OBSTETRICS & GYNECOLOGY

## 2017-06-29 PROCEDURE — 77067 SCR MAMMO BI INCL CAD: CPT | Mod: TC

## 2017-06-29 PROCEDURE — 99999 PR PBB SHADOW E&M-EST. PATIENT-LVL III: CPT | Mod: PBBFAC,,, | Performed by: OBSTETRICS & GYNECOLOGY

## 2017-06-29 PROCEDURE — 77063 BREAST TOMOSYNTHESIS BI: CPT | Mod: 26,,, | Performed by: RADIOLOGY

## 2017-06-29 PROCEDURE — 77067 SCR MAMMO BI INCL CAD: CPT | Mod: 26,,, | Performed by: RADIOLOGY

## 2017-06-29 RX ORDER — ESTRADIOL 0.07 MG/D
FILM, EXTENDED RELEASE TRANSDERMAL
Qty: 24 PATCH | Refills: 3 | Status: SHIPPED | OUTPATIENT
Start: 2017-06-29 | End: 2018-09-27 | Stop reason: SDUPTHER

## 2017-06-29 NOTE — PROGRESS NOTES
Chief Complaint   Patient presents with    Well Woman     no problems     Medication Management     HRT states she would like a paper RX for this medication.      History of Present Illness: Katerina Dickson is a 61 y.o. female that presents today 2017 for well gyn visit.    Past Medical History:   Diagnosis Date    GERD (gastroesophageal reflux disease)     Hashimoto's thyroiditis     Hypothyroidism     Pancreatitis ,    Postmenopausal HRT (hormone replacement therapy)        Past Surgical History:   Procedure Laterality Date    CHOLECYSTECTOMY      COLONOSCOPY      ESOPHAGOGASTRODUODENOSCOPY      EYE SURGERY Bilateral     cataract    HYSTERECTOMY      heavy periods    OOPHORECTOMY         Current Outpatient Prescriptions   Medication Sig Dispense Refill    buPROPion (WELLBUTRIN XL) 300 MG 24 hr tablet Take 1 tablet (300 mg total) by mouth once daily. 90 tablet 3    DOXYLAMINE SUCCINATE (UNISOM ORAL) Take 100 mg by mouth every evening. 2 tablets nightly      estradiol (VIVELLE-DOT) 0.075 mg/24 hr APPLY 1 PATCH EXTERNALLY TO THE SKIN 2 TIMES A WEEK 24 patch 3    Lactobacillus rhamnosus GG (CULTURELLE) 10 billion cell capsule Take 1 capsule by mouth every evening.       SYNTHROID 100 mcg tablet Take 1 tablet (100 mcg total) by mouth once daily. 90 tablet 3     No current facility-administered medications for this visit.        Review of patient's allergies indicates:  No Known Allergies    Family History   Problem Relation Age of Onset    Breast cancer Mother 38      at 39 years of age    Arthritis Father     Heart disease Neg Hx        Social History     Social History    Marital status:      Spouse name: N/A    Number of children: N/A    Years of education: N/A     Social History Main Topics    Smoking status: Former Smoker     Types: Cigarettes     Quit date: 1995    Smokeless tobacco: Never Used    Alcohol use 5.4 oz/week     1 Glasses of wine,  "1 Shots of liquor, 7 Standard drinks or equivalent per week      Comment: 2-3 drinks per day    Drug use: No    Sexual activity: Yes     Partners: Male     Birth control/ protection: Surgical     Other Topics Concern    None     Social History Narrative    None       OB History    Para Term  AB Living   5 2 2   3 2   SAB TAB Ectopic Multiple Live Births   3       2      # Outcome Date GA Lbr Siddharth/2nd Weight Sex Delivery Anes PTL Lv   5 1989           4 1988           3 Term  40w0d  3.714 kg (8 lb 3 oz) F Vag-Spont None  LAZARA      Birth Comments: no complications   2 1984           1 Term  40w0d  3.345 kg (7 lb 6 oz) M Vag-Spont None  LAZARA      Birth Comments: no complications          Review of Symptoms:  GENERAL: Denies weight gain or weight loss. Feeling well overall.   SKIN: Denies rash or lesions.   HEAD: Denies head injury or headache.   NODES: Denies enlarged lymph nodes.   CHEST: Denies chest pain or shortness of breath.   CARDIOVASCULAR: Denies palpitations or left sided chest pain.   ABDOMEN: No abdominal pain, constipation, diarrhea, nausea, vomiting or rectal bleeding.   URINARY: No frequency, dysuria, hematuria, or burning on urination.  HEMATOLOGIC: No easy bruisability or excessive bleeding.   MUSCULOSKELETAL: Denies joint pain or swelling.     /64   Ht 5' 9" (1.753 m)   Wt 67.1 kg (147 lb 14.9 oz)   Physical Exam:  APPEARANCE: Well nourished, well developed, in no acute distress.  SKIN: Normal skin turgor, no lesions.  NECK: Neck symmetric without masses   RESPIRATORY: Normal respiratory effort with no retractions or use of accessory muscles  CARDIOVASCULAR: Peripheral vascular system with no swelling no varicosities and palpation of pulses normal  LYMPHATIC: No enlargements of the lymph nodes noted in the neck, axillae, or groin  ABDOMEN: Soft. No tenderness or masses. No hepatosplenomegaly. No hernias.  BREASTS: Symmetrical, no skin changes or visible " lesions. No palpable masses, nipple discharge or adenopathy bilaterally.  PELVIC: Normal external female genitalia without lesions. Normal hair distribution. Adequate perineal body, normal urethral meatus. Urethra with no masses.  Bladder nontender. Vagina moist and well rugated without lesions or discharge. No significant cystocele or rectocele.  Adnexa without masses or tenderness. Urethra and bladder normal.   EXTREMITIES: No clubbing cyanosis or edema.    ASSESSMENT/PLAN:  Encounter for gynecological examination without abnormal finding    Visit for screening mammogram  -     Cancel: Mammo Digital Screening Bilat With CAD; Future; Expected date: 06/29/2017    Postmenopausal HRT (hormone replacement therapy)  -     estradiol (VIVELLE-DOT) 0.075 mg/24 hr; APPLY 1 PATCH EXTERNALLY TO THE SKIN 2 TIMES A WEEK  Dispense: 24 patch; Refill: 3    plans for colonoscopy this year      Patient was counseled today on Pap guidelines, recommendation for yearly exams, mammograms every other year after the age of 40 and annually after the age of 50, Colonoscopy after the age of 50, Dexa Bone Scan and calcium and vitamin D supplementation in menopause and to see her PCP for other health maintenance.   FOLLOW-UP:prn

## 2017-06-30 PROBLEM — R07.9 CHEST PAIN: Status: ACTIVE | Noted: 2017-06-30

## 2017-07-06 ENCOUNTER — TELEPHONE (OUTPATIENT)
Dept: HEMATOLOGY/ONCOLOGY | Facility: CLINIC | Age: 62
End: 2017-07-06

## 2017-07-06 ENCOUNTER — TELEPHONE (OUTPATIENT)
Dept: INFUSION THERAPY | Facility: HOSPITAL | Age: 62
End: 2017-07-06

## 2017-07-06 NOTE — TELEPHONE ENCOUNTER
Phone call from patient wanting to cancel her lab appt.  Asked patient about Dr. Rao appt and if she was getting labs elsewhere.  She stated that she was not getting labs at all and she wanted to cancel Dr. Rao appt as well.  Informed Ciara Macdonald LPN patient wanted labs and md appt cancelled.

## 2017-07-06 NOTE — TELEPHONE ENCOUNTER
----- Message from Angela Chinoza sent at 7/6/2017  9:55 AM CDT -----  Contact: self [phone]  She wants to cancel the labs and her upcoming appt with you.  She does not want to reschedule.  Thank you!

## 2017-07-06 NOTE — TELEPHONE ENCOUNTER
Called patient in reference to message from infusion nurse, Josseline, that patient wanted to cancel 8/10/17 lab and follow up. Offered to patient to reschedule, patient declined stating it was just a follow up and everything is fine.

## 2017-07-10 ENCOUNTER — TELEPHONE (OUTPATIENT)
Dept: FAMILY MEDICINE | Facility: CLINIC | Age: 62
End: 2017-07-10

## 2017-07-10 DIAGNOSIS — R06.09 DOE (DYSPNEA ON EXERTION): Primary | ICD-10-CM

## 2017-07-10 NOTE — TELEPHONE ENCOUNTER
----- Message from Ghazal Virgen sent at 7/10/2017 10:39 AM CDT -----  Contact: PT  Returning call  Call back on # [phone]   thanks

## 2017-07-10 NOTE — TELEPHONE ENCOUNTER
----- Message from Dora Menchaca sent at 7/10/2017  9:57 AM CDT -----  Patient is calling concerning test results.Please call patient back at [phone] to advise.  Thanks!

## 2017-07-10 NOTE — TELEPHONE ENCOUNTER
Attempted to reach patient via both contact numbers provided, LM on voicemail and with spouse to return call to clinic.

## 2017-07-10 NOTE — TELEPHONE ENCOUNTER
Please let her know that the next step is to look at her lung function. I am ordering PFTs that need to be done at the hospital.   She should hear from the hospital to schedule in next week.     Thanks.

## 2017-07-10 NOTE — TELEPHONE ENCOUNTER
Patient states her angiogram was normal, heart is fine. She would like advice concerning continued intermittent symptoms of difficulty breathing with exertion/SOB, dizziness, headaches and blurred vision, symptoms have increased over the last few weeks.     Patient also requesting colonoscopy order due to change in insurance.     Please advise.

## 2017-07-18 ENCOUNTER — TELEPHONE (OUTPATIENT)
Dept: FAMILY MEDICINE | Facility: CLINIC | Age: 62
End: 2017-07-18

## 2017-07-18 NOTE — TELEPHONE ENCOUNTER
----- Message from Angela Rooney sent at 7/18/2017  1:27 PM CDT -----  Contact: self [phone]  She is calling to let you know that she is still waiting to hear from the pulmonary doctor.  Thank you!

## 2017-07-18 NOTE — TELEPHONE ENCOUNTER
Spoke with patient and let her know I spoke with PFT call center/appt line and they stated that the orders are not coming into their cue for some reason to call and sche'd these appts. From now on we need to give them the number to set up their own appts. I spoke to someone in the department and they will be calling her soon. Also gave patient phone number to call. [phone]. Patient voiced all understanding.

## 2017-08-30 ENCOUNTER — TELEPHONE (OUTPATIENT)
Dept: OBSTETRICS AND GYNECOLOGY | Facility: CLINIC | Age: 62
End: 2017-08-30

## 2017-08-30 NOTE — TELEPHONE ENCOUNTER
----- Message from Fanny Templeton sent at 8/29/2017  3:31 PM CDT -----  Contact: pt   Wants nurse for call back   Pre auth for drug   Call back

## 2017-09-13 ENCOUNTER — TELEPHONE (OUTPATIENT)
Dept: OBSTETRICS AND GYNECOLOGY | Facility: CLINIC | Age: 62
End: 2017-09-13

## 2017-09-19 DIAGNOSIS — F33.0 MAJOR DEPRESSIVE DISORDER, RECURRENT, MILD: ICD-10-CM

## 2017-09-19 DIAGNOSIS — E03.9 HYPOTHYROIDISM, UNSPECIFIED TYPE: ICD-10-CM

## 2017-09-19 RX ORDER — LEVOTHYROXINE SODIUM 100 UG/1
100 TABLET ORAL DAILY
Qty: 90 TABLET | Refills: 3 | Status: SHIPPED | OUTPATIENT
Start: 2017-09-19 | End: 2017-10-27

## 2017-09-19 RX ORDER — BUPROPION HYDROCHLORIDE 300 MG/1
300 TABLET ORAL DAILY
Qty: 90 TABLET | Refills: 3 | Status: SHIPPED | OUTPATIENT
Start: 2017-09-19 | End: 2018-08-26 | Stop reason: SDUPTHER

## 2017-09-22 ENCOUNTER — CLINICAL SUPPORT (OUTPATIENT)
Dept: FAMILY MEDICINE | Facility: CLINIC | Age: 62
End: 2017-09-22
Payer: COMMERCIAL

## 2017-09-22 ENCOUNTER — TELEPHONE (OUTPATIENT)
Dept: FAMILY MEDICINE | Facility: CLINIC | Age: 62
End: 2017-09-22

## 2017-09-22 DIAGNOSIS — Z23 IMMUNIZATION DUE: Primary | ICD-10-CM

## 2017-09-22 DIAGNOSIS — Z12.11 SCREEN FOR COLON CANCER: Primary | ICD-10-CM

## 2017-09-22 PROCEDURE — 90471 IMMUNIZATION ADMIN: CPT | Mod: S$GLB,,, | Performed by: INTERNAL MEDICINE

## 2017-09-22 PROCEDURE — 90686 IIV4 VACC NO PRSV 0.5 ML IM: CPT | Mod: S$GLB,,, | Performed by: INTERNAL MEDICINE

## 2017-09-22 PROCEDURE — 99211 OFF/OP EST MAY X REQ PHY/QHP: CPT | Mod: 25,S$GLB,, | Performed by: INTERNAL MEDICINE

## 2017-10-03 ENCOUNTER — TELEPHONE (OUTPATIENT)
Dept: UROLOGY | Facility: CLINIC | Age: 62
End: 2017-10-03

## 2017-10-11 ENCOUNTER — PATIENT OUTREACH (OUTPATIENT)
Dept: ADMINISTRATIVE | Facility: HOSPITAL | Age: 62
End: 2017-10-11

## 2017-10-11 NOTE — LETTER
October 17, 2017    Katerina Dickson  [address] [facility] [address]             [facility]  [address]  [facility] [address]  Phone: [phone] Dear Mrs. Dickson:    We have tried to reach you by mychart unsuccessfully.    [facility] is committed to your overall health.  To help you get the most out of each of your visits, we will review your information to make sure you are up to date on all of your recommended tests and/or procedures.       Dr. Kaitlyn Butler has found that your chart shows you may be due for your fasting cholesterol labs.     If you have had any of the above done at another facility, please bring the records or information with you so that your record at [facility] will be complete.  If you would like to schedule any of these, please contact me.     If you are currently taking medication, please bring it with you to your appointment for review.      If you have any questions or concerns, please don't hesitate to call.    Thank you for letting us care for you,  Jaleesa Julian LPN Clinical Care Coordinator  [facility] [place]  [phone]

## 2017-10-23 ENCOUNTER — SURGERY (OUTPATIENT)
Age: 62
End: 2017-10-23

## 2017-10-23 ENCOUNTER — ANESTHESIA (OUTPATIENT)
Dept: ENDOSCOPY | Facility: HOSPITAL | Age: 62
End: 2017-10-23
Payer: COMMERCIAL

## 2017-10-23 ENCOUNTER — ANESTHESIA EVENT (OUTPATIENT)
Dept: ENDOSCOPY | Facility: HOSPITAL | Age: 62
End: 2017-10-23
Payer: COMMERCIAL

## 2017-10-23 ENCOUNTER — HOSPITAL ENCOUNTER (OUTPATIENT)
Facility: HOSPITAL | Age: 62
Discharge: HOME | End: 2017-10-23
Attending: INTERNAL MEDICINE | Admitting: INTERNAL MEDICINE
Payer: COMMERCIAL

## 2017-10-23 VITALS
DIASTOLIC BLOOD PRESSURE: 68 MMHG | SYSTOLIC BLOOD PRESSURE: 125 MMHG | WEIGHT: 145 LBS | TEMPERATURE: 98 F | BODY MASS INDEX: 21.48 KG/M2 | RESPIRATION RATE: 15 BRPM | HEART RATE: 68 BPM | OXYGEN SATURATION: 98 % | HEIGHT: 69 IN

## 2017-10-23 VITALS — RESPIRATION RATE: 18 BRPM

## 2017-10-23 DIAGNOSIS — Z12.11 COLON CANCER SCREENING: ICD-10-CM

## 2017-10-23 PROCEDURE — 37000009 HC ANESTHESIA EA ADD 15 MINS: Mod: PO | Performed by: INTERNAL MEDICINE

## 2017-10-23 PROCEDURE — D9220A PRA ANESTHESIA: Mod: 33,CRNA,,

## 2017-10-23 PROCEDURE — 27201089 HC SNARE, DISP (ANY): Mod: PO | Performed by: INTERNAL MEDICINE

## 2017-10-23 PROCEDURE — 88305 TISSUE EXAM BY PATHOLOGIST: CPT

## 2017-10-23 PROCEDURE — 88305 TISSUE EXAM BY PATHOLOGIST: CPT | Mod: 26,,,

## 2017-10-23 PROCEDURE — D9220A PRA ANESTHESIA: Mod: 33,ANES,, | Performed by: ANESTHESIOLOGY

## 2017-10-23 PROCEDURE — 63600175 PHARM REV CODE 636 W HCPCS: Mod: PO | Performed by: NURSE ANESTHETIST, CERTIFIED REGISTERED

## 2017-10-23 PROCEDURE — 37000008 HC ANESTHESIA 1ST 15 MINUTES: Mod: PO | Performed by: INTERNAL MEDICINE

## 2017-10-23 PROCEDURE — 45385 COLONOSCOPY W/LESION REMOVAL: CPT | Mod: 33,,, | Performed by: INTERNAL MEDICINE

## 2017-10-23 PROCEDURE — 45385 COLONOSCOPY W/LESION REMOVAL: CPT | Mod: PO | Performed by: INTERNAL MEDICINE

## 2017-10-23 PROCEDURE — 25000003 PHARM REV CODE 250: Mod: PO | Performed by: INTERNAL MEDICINE

## 2017-10-23 RX ORDER — SODIUM CHLORIDE, SODIUM LACTATE, POTASSIUM CHLORIDE, CALCIUM CHLORIDE 600; 310; 30; 20 MG/100ML; MG/100ML; MG/100ML; MG/100ML
INJECTION, SOLUTION INTRAVENOUS CONTINUOUS
Status: DISCONTINUED | OUTPATIENT
Start: 2017-10-24 | End: 2017-10-23 | Stop reason: HOSPADM

## 2017-10-23 RX ORDER — PROPOFOL 10 MG/ML
VIAL (ML) INTRAVENOUS
Status: DISCONTINUED | OUTPATIENT
Start: 2017-10-23 | End: 2017-10-23

## 2017-10-23 RX ORDER — LIDOCAINE HCL/PF 100 MG/5ML
SYRINGE (ML) INTRAVENOUS
Status: DISCONTINUED | OUTPATIENT
Start: 2017-10-23 | End: 2017-10-23

## 2017-10-23 RX ADMIN — PROPOFOL 40 MG: 10 INJECTION, EMULSION INTRAVENOUS at 10:10

## 2017-10-23 RX ADMIN — SODIUM CHLORIDE, SODIUM LACTATE, POTASSIUM CHLORIDE, AND CALCIUM CHLORIDE: .6; .31; .03; .02 INJECTION, SOLUTION INTRAVENOUS at 10:10

## 2017-10-23 RX ADMIN — PROPOFOL 30 MG: 10 INJECTION, EMULSION INTRAVENOUS at 10:10

## 2017-10-23 RX ADMIN — LIDOCAINE HYDROCHLORIDE 75 MG: 20 INJECTION, SOLUTION INTRAVENOUS at 10:10

## 2017-10-23 RX ADMIN — PROPOFOL 100 MG: 10 INJECTION, EMULSION INTRAVENOUS at 10:10

## 2017-10-23 NOTE — H&P
History & Physical - Short Stay  Gastroenterology      SUBJECTIVE:     Procedure: Colonoscopy    Chief Complaint/Indication for Procedure: Screening    PTA Medications   Medication Sig    buPROPion (WELLBUTRIN XL) 300 MG 24 hr tablet Take 1 tablet (300 mg total) by mouth once daily.    DOXYLAMINE SUCCINATE (UNISOM ORAL) Take 100 mg by mouth every evening. 2 tablets nightly    estradiol (VIVELLE-DOT) 0.075 mg/24 hr APPLY 1 PATCH EXTERNALLY TO THE SKIN 2 TIMES A WEEK    Lactobacillus rhamnosus GG (CULTURELLE) 10 billion cell capsule Take 1 capsule by mouth every evening.     SYNTHROID 100 mcg tablet Take 1 tablet (100 mcg total) by mouth once daily.    acetaminophen (TYLENOL) 325 MG tablet Take 1 tablet (325 mg total) by mouth every 6 (six) hours as needed for Pain.       Review of patient's allergies indicates:  No Known Allergies     Past Medical History:   Diagnosis Date    GERD (gastroesophageal reflux disease)     Hashimoto's thyroiditis     Hypothyroidism     Pancreatitis ,    Postmenopausal HRT (hormone replacement therapy)      Past Surgical History:   Procedure Laterality Date    CHOLECYSTECTOMY      COLONOSCOPY      every 5    ESOPHAGOGASTRODUODENOSCOPY      EYE SURGERY Bilateral     cataract    HYSTERECTOMY      heavy periods    OOPHORECTOMY       Family History   Problem Relation Age of Onset    Breast cancer Mother 38      at 39 years of age    Arthritis Father     Breast cancer Maternal Grandmother     Heart disease Neg Hx      Social History   Substance Use Topics    Smoking status: Former Smoker     Types: Cigarettes     Quit date: 1995    Smokeless tobacco: Never Used    Alcohol use 5.4 oz/week     1 Glasses of wine, 1 Shots of liquor, 7 Standard drinks or equivalent per week      Comment: 2-3 drinks per day         OBJECTIVE:     Vital Signs (Most Recent)       Physical Exam                                                        GENERAL:   Comfortable, in no acute distress.                                 HEENT EXAM:  Nonicteric.  No adenopathy.  Oropharynx is clear.               NECK:  Supple.                                                               LUNGS:  Clear.                                                               CARDIAC:  Regular rate and rhythm.  S1, S2.  No murmur.                      ABDOMEN:  Soft, positive bowel sounds, nontender.  No hepatosplenomegaly or masses.  No rebound or guarding.                                             EXTREMITIES:  No edema.     MENTAL STATUS:  Normal, alert and oriented.      ASSESSMENT/PLAN:     Assessment: Colorectal cancer screening    Plan: Colonoscopy    Anesthesia Plan: General    ASA Grade: ASA 2 - Patient with mild systemic disease with no functional limitations    MALLAMPATI SCORE:  I (soft palate, uvula, fauces, and tonsillar pillars visible)

## 2017-10-23 NOTE — DISCHARGE SUMMARY
Discharge Note  Short Stay      SUMMARY     Admit Date: 10/23/2017    Attending Physician: Pankaj Shah MD     Discharge Physician: Pankaj Shah MD    Discharge Date: 10/23/2017 10:54 AM    Final Diagnosis: Screen for colon cancer [Z12.11]    Disposition: HOME OR SELF CARE    Patient Instructions:   Current Discharge Medication List      CONTINUE these medications which have NOT CHANGED    Details   buPROPion (WELLBUTRIN XL) 300 MG 24 hr tablet Take 1 tablet (300 mg total) by mouth once daily.  Qty: 90 tablet, Refills: 3    Associated Diagnoses: Major depressive disorder, recurrent, mild      DOXYLAMINE SUCCINATE (UNISOM ORAL) Take 100 mg by mouth every evening. 2 tablets nightly      estradiol (VIVELLE-DOT) 0.075 mg/24 hr APPLY 1 PATCH EXTERNALLY TO THE SKIN 2 TIMES A WEEK  Qty: 24 patch, Refills: 3    Associated Diagnoses: Postmenopausal HRT (hormone replacement therapy)      Lactobacillus rhamnosus GG (CULTURELLE) 10 billion cell capsule Take 1 capsule by mouth every evening.       SYNTHROID 100 mcg tablet Take 1 tablet (100 mcg total) by mouth once daily.  Qty: 90 tablet, Refills: 3    Associated Diagnoses: Hypothyroidism, unspecified type      acetaminophen (TYLENOL) 325 MG tablet Take 1 tablet (325 mg total) by mouth every 6 (six) hours as needed for Pain.             Discharge Procedure Orders (must include Diet, Follow-up, Activity)    Follow Up:  Follow up with PCP as previously scheduled  Resume routine diet.  Activity as tolerated.    No driving day of procedure.

## 2017-10-23 NOTE — ANESTHESIA PREPROCEDURE EVALUATION
10/23/2017  Katerina Dickson is a 62 y.o., female.    Pre-op Assessment    I have reviewed the Patient Summary Reports.     I have reviewed the Nursing Notes.   I have reviewed the Medications.     Review of Systems  Anesthesia Hx:  No problems with previous Anesthesia  Denies Family Hx of Anesthesia complications.   Denies Personal Hx of Anesthesia complications.   Social:  Alcohol Use Former smoker   Hematology/Oncology:        Hematology Comments: Thrombocytopenia, etiology unknown   Cardiovascular:   Exercise tolerance: good YO    Pulmonary:   Shortness of breath    Hepatic/GI:   Bowel Prep. GERD H/o pancreatitis   Musculoskeletal:   Arthritis     Neurological:   Denies Neuromuscular Disease.   Chronic Pain Syndrome   Endocrine:   Hypothyroidism    Psych:   anxiety depression          Physical Exam  General:  Well nourished    Airway/Jaw/Neck:  Airway Findings: Mouth Opening: Normal Tongue: Normal  Mallampati: III  Improves to II with phonation.  TM Distance: 4 - 6 cm  Jaw/Neck Findings:  Neck ROM: Normal ROM       Chest/Lungs:  Chest/Lungs Findings: Clear to auscultation, Normal Respiratory Rate     Heart/Vascular:  Heart Findings: Rate: Normal  Rhythm: Regular Rhythm  Sounds: Normal        Mental Status:  Mental Status Findings:  Cooperative, Alert and Oriented         Anesthesia Plan  Type of Anesthesia, risks & benefits discussed:  Anesthesia Type:  general  Patient's Preference:   Intra-op Monitoring Plan:   Intra-op Monitoring Plan Comments:   Post Op Pain Control Plan:   Post Op Pain Control Plan Comments:   Induction:   IV  Beta Blocker:  Patient is not currently on a Beta-Blocker (No further documentation required).       Informed Consent: Patient understands risks and agrees with Anesthesia plan.  Questions answered. Anesthesia consent signed with patient.  ASA Score: 2     Day of Surgery Review  of History & Physical:    H&P update referred to the provider.         Ready For Surgery From Anesthesia Perspective.

## 2017-10-23 NOTE — DISCHARGE INSTRUCTIONS
Recovery After Procedural Sedation (Adult)  You have been given medicine by vein to make you sleep during your surgery. This may have included both a pain medicine and sleeping medicine. Most of the effects have worn off. But you may still have some drowsiness for the next 6 to 8 hours.  Home care  Follow these guidelines when you get home:  · For the next 8 hours, you should be watched by a responsible adult. This person should make sure your condition is not getting worse.  · Don't drink any alcohol for the next 24 hours.  · Don't drive, operate dangerous machinery, or make important business or personal decisions during the next 24 hours.  Note: Your healthcare provider may tell you not to take any medicine by mouth for pain or sleep in the next 4 hours. These medicines may react with the medicines you were given in the hospital. This could cause a much stronger response than usual.  Follow-up care  Follow up with your healthcare provider if you are not alert and back to your usual level of activity within 12 hours.  When to seek medical advice  Call your healthcare provider right away if any of these occur:  · Drowsiness gets worse  · Weakness or dizziness gets worse  · Repeated vomiting  · You can't be awakened   Date Last Reviewed: 10/18/2016  © 1652-4122 [employer]. [address] [place] [address], [place] [address]. All rights reserved. This information is not intended as a substitute for professional medical care. Always follow your healthcare professional's instructions.      Understanding Colon and Rectal Polyps    The colon (also called the large intestine) is a muscular tube that forms the last part of the digestive tract. It absorbs water and stores food waste. The colon is about 4 to 6 feet long. The rectum is the last 6 inches of the colon. The colon and rectum have a smooth lining composed of millions of cells. Changes in these cells can lead to growths in the colon that can become cancerous  and should be removed. Multiple tests are available to screen for colon cancer, but the colonoscopy is the most recommended test. During colonoscopy, these polyps can be removed. How often you need this test depends on many things including your condition, your family history, symptoms, and what the findings were at the previous colonoscopy.   When the colon lining changes  Changes that happen in the cells that line the colon or rectum can lead to growths called polyps. Over a period of years, polyps can turn cancerous. Removing polyps early may prevent cancer from ever forming.  Polyps  Polyps are fleshy clumps of tissue that form on the lining of the colon or rectum. Small polyps are usually benign (not cancerous). However, over time, cells in a polyp can change and become cancerous. Certain types of polyps known as adenomatous polyps are premalignant. The risk for invasive cancer increases with the size of the polyp and certain cell and gene features. This means that they can become cancerous if they're not removed. Hyperplastic polyps are benign. They can grow quite large and not turn cancerous.   Cancer  Almost all colorectal cancers start when polyp cells begin growing abnormally. As a cancerous tumor grows, it may involve more and more of the colon or rectum. In time, cancer can also grow beyond the colon or rectum and spread to nearby organs or to glands called lymph nodes. The cells can also travel to other parts of the body. This is known as metastasis. The earlier a cancerous tumor is removed, the better the chance of preventing its spread.    Date Last Reviewed: 8/1/2016  © 6110-7455 [employer]. [address] [place] [address], [place] [address]. All rights reserved. This information is not intended as a substitute for professional medical care. Always follow your healthcare professional's instructions.

## 2017-10-23 NOTE — TRANSFER OF CARE
"Anesthesia Transfer of Care Note    Patient: Katerina Dickson    Procedure(s) Performed: Procedure(s) (LRB):  COLONOSCOPY (N/A)    Patient location: PACU    Anesthesia Type: general    Transport from OR: Transported from OR on room air with adequate spontaneous ventilation    Post pain: adequate analgesia    Post assessment: tolerated procedure well and no apparent anesthetic complications    Post vital signs: stable    Level of consciousness: awake    Nausea/Vomiting: no nausea/vomiting    Complications: none    Transfer of care protocol was followed      Last vitals:   Visit Vitals  /71 (BP Location: Right arm)   Pulse 65   Temp 36.4 °C (97.6 °F) (Skin)   Resp 16   Ht 5' 9" (1.753 m)   Wt 65.8 kg (145 lb)   SpO2 95%   Breastfeeding? No   BMI 21.41 kg/m²     "

## 2017-10-23 NOTE — ANESTHESIA POSTPROCEDURE EVALUATION
"Anesthesia Post Evaluation    Patient: Katerina Dickson    Procedure(s) Performed: Procedure(s) (LRB):  COLONOSCOPY (N/A)    Final Anesthesia Type: general  Patient location during evaluation: PACU  Patient participation: Yes- Able to Participate  Level of consciousness: awake and alert  Post-procedure vital signs: reviewed and stable  Pain management: adequate  Airway patency: patent  PONV status at discharge: No PONV  Anesthetic complications: no      Cardiovascular status: blood pressure returned to baseline  Respiratory status: unassisted  Hydration status: euvolemic  Follow-up not needed.        Visit Vitals  /68 (BP Location: Left arm, Patient Position: Sitting)   Pulse 68   Temp 36.5 °C (97.7 °F) (Skin)   Resp 15   Ht 5' 9" (1.753 m)   Wt 65.8 kg (145 lb)   SpO2 98%   Breastfeeding? No   BMI 21.41 kg/m²       Pain/Jasson Score: Pain Assessment Performed: Yes (10/23/2017 11:20 AM)  Presence of Pain: denies (10/23/2017 11:20 AM)  Jasson Score: 10 (10/23/2017 11:20 AM)      "

## 2017-10-25 ENCOUNTER — HOSPITAL ENCOUNTER (OUTPATIENT)
Dept: RADIOLOGY | Facility: HOSPITAL | Age: 62
Discharge: HOME | End: 2017-10-25
Attending: INTERNAL MEDICINE
Payer: COMMERCIAL

## 2017-10-25 ENCOUNTER — OFFICE VISIT (OUTPATIENT)
Dept: FAMILY MEDICINE | Facility: CLINIC | Age: 62
End: 2017-10-25
Payer: COMMERCIAL

## 2017-10-25 VITALS
DIASTOLIC BLOOD PRESSURE: 70 MMHG | BODY MASS INDEX: 21.53 KG/M2 | TEMPERATURE: 98 F | SYSTOLIC BLOOD PRESSURE: 115 MMHG | HEIGHT: 69 IN | RESPIRATION RATE: 16 BRPM | HEART RATE: 80 BPM | WEIGHT: 145.38 LBS

## 2017-10-25 DIAGNOSIS — R06.09 DOE (DYSPNEA ON EXERTION): Primary | ICD-10-CM

## 2017-10-25 DIAGNOSIS — E04.2 MULTINODULAR GOITER: ICD-10-CM

## 2017-10-25 DIAGNOSIS — E03.9 HYPOTHYROIDISM, UNSPECIFIED TYPE: ICD-10-CM

## 2017-10-25 DIAGNOSIS — F33.0 MAJOR DEPRESSIVE DISORDER, RECURRENT, MILD: ICD-10-CM

## 2017-10-25 DIAGNOSIS — Z79.890 HORMONE REPLACEMENT THERAPY (HRT): ICD-10-CM

## 2017-10-25 DIAGNOSIS — F51.01 PRIMARY INSOMNIA: ICD-10-CM

## 2017-10-25 DIAGNOSIS — E78.5 HYPERLIPIDEMIA, UNSPECIFIED HYPERLIPIDEMIA TYPE: ICD-10-CM

## 2017-10-25 DIAGNOSIS — R06.09 DOE (DYSPNEA ON EXERTION): ICD-10-CM

## 2017-10-25 LAB
ALBUMIN SERPL BCP-MCNC: 3.7 G/DL
ALP SERPL-CCNC: 56 U/L
ALT SERPL W/O P-5'-P-CCNC: 15 U/L
ANION GAP SERPL CALC-SCNC: 10 MMOL/L
AST SERPL-CCNC: 25 U/L
BILIRUB SERPL-MCNC: 0.4 MG/DL
BUN SERPL-MCNC: 18 MG/DL
CALCIUM SERPL-MCNC: 9.3 MG/DL
CHLORIDE SERPL-SCNC: 105 MMOL/L
CHOLEST SERPL-MCNC: 250 MG/DL
CHOLEST/HDLC SERPL: 4.2 {RATIO}
CO2 SERPL-SCNC: 23 MMOL/L
CREAT SERPL-MCNC: 0.9 MG/DL
EST. GFR  (AFRICAN AMERICAN): >60 ML/MIN/1.73 M^2
EST. GFR  (NON AFRICAN AMERICAN): >60 ML/MIN/1.73 M^2
GLUCOSE SERPL-MCNC: 72 MG/DL
HDLC SERPL-MCNC: 59 MG/DL
HDLC SERPL: 23.6 %
LDLC SERPL CALC-MCNC: 165 MG/DL
NONHDLC SERPL-MCNC: 191 MG/DL
POTASSIUM SERPL-SCNC: 4.9 MMOL/L
PROT SERPL-MCNC: 7.2 G/DL
SODIUM SERPL-SCNC: 138 MMOL/L
T4 FREE SERPL-MCNC: 1.12 NG/DL
TRIGL SERPL-MCNC: 130 MG/DL
TSH SERPL DL<=0.005 MIU/L-ACNC: 0.3 UIU/ML

## 2017-10-25 PROCEDURE — 71260 CT THORAX DX C+: CPT | Mod: 26,,, | Performed by: RADIOLOGY

## 2017-10-25 PROCEDURE — 99214 OFFICE O/P EST MOD 30 MIN: CPT | Mod: S$GLB,,, | Performed by: INTERNAL MEDICINE

## 2017-10-25 PROCEDURE — 80053 COMPREHEN METABOLIC PANEL: CPT

## 2017-10-25 PROCEDURE — 84443 ASSAY THYROID STIM HORMONE: CPT

## 2017-10-25 PROCEDURE — 80061 LIPID PANEL: CPT

## 2017-10-25 PROCEDURE — 25500020 PHARM REV CODE 255: Mod: PO | Performed by: INTERNAL MEDICINE

## 2017-10-25 PROCEDURE — 84439 ASSAY OF FREE THYROXINE: CPT

## 2017-10-25 PROCEDURE — 71260 CT THORAX DX C+: CPT | Mod: TC,PO

## 2017-10-25 RX ORDER — NAPROXEN SODIUM 220 MG/1
81 TABLET, FILM COATED ORAL DAILY
COMMUNITY
End: 2023-10-27

## 2017-10-25 RX ADMIN — IOHEXOL 75 ML: 350 INJECTION, SOLUTION INTRAVENOUS at 05:10

## 2017-10-25 NOTE — PROGRESS NOTES
Subjective:       Patient ID: Katerina Dickson is a 62 y.o. female.    Current Outpatient Prescriptions   Medication Sig Dispense Refill    acetaminophen (TYLENOL) 325 MG tablet Take 1 tablet (325 mg total) by mouth every 6 (six) hours as needed for Pain.      aspirin 81 MG Chew Take 81 mg by mouth once daily.      buPROPion (WELLBUTRIN XL) 300 MG 24 hr tablet Take 1 tablet (300 mg total) by mouth once daily. 90 tablet 3    DOXYLAMINE SUCCINATE (UNISOM ORAL) Take 100 mg by mouth every evening. 2 tablets nightly      estradiol (VIVELLE-DOT) 0.075 mg/24 hr APPLY 1 PATCH EXTERNALLY TO THE SKIN 2 TIMES A WEEK 24 patch 3    Lactobacillus rhamnosus GG (CULTURELLE) 10 billion cell capsule Take 1 capsule by mouth every evening.       SYNTHROID 100 mcg tablet Take 1 tablet (100 mcg total) by mouth once daily. 90 tablet 3     No current facility-administered medications for this visit.      Chief Complaint: Follow-up (6 month f/u. )  She is here today to f/u on chronic medical issues.     She continues to complain of shortness of breath that started suddenly in 3/2017 after running a race with her daughter.  She has dyspnea on exertion but not at rest. She denies wheezing or coughing. She is not a smoker. She was seen by cardiology for questionable stress test and underwent a cardiac cath on 5/2017 that showed clean coronary arteries.  She has an echocardiogram done that was normal EF and no diastolic dysfunction, no valve disease (PAP pressures were not measured).  She says she is very fatigued and gets winded easily.  . She can not do as much as she could 9 months ago and had to stop exercising. No chest pain or palpitations. No swelling in her legs.  She started taking aspirin daily and feels this has helped with her symptoms.  She had spirometry done on 7/2017 that was normal.      She has depression that has been controlled since 2005 on wellbutrin. She is doing well and tolerating medication well. She does have  significant sleep issues and takes unisom x 2  every night to sleep. She has trouble falling asleep.      She has hypothyroid and is taking synthroid. Her last TSH was normal on 5/2017. She had a thyroid u/s done on 4/2017 that showed small nodules unchanged from u/s done on 4/2016 and none that meet FNA criteria.      She has elevated lipids but is not on treatment. Her last lipids were checked on 8/2016 were 257/113/60/174.  She has not had this rechecked.       She is taking HRT patches since her RUI in 1995. She is managed by gyn.      She has GERD and has been taking prevacid for many years. She had her last EGD in 2016 that showed some gastric polyps but otherwise normal. She does not complain of reflux symptoms today.  She had a colonoscopy done one week ago and polyp bx still pending. .      She was noted to have very low platelets that have now normalized. She was followed by hematology who feel that the platelets were artificially low due to clumping in the vial before processing. She f/u with hematology in June.       She has a history or chronic pancreatitis that required hospitalization x 3. Her last episode was in 2006 where she was hospitalized for one month and had gallbladder removed. She has not had episode since.       Colonoscopy---10/2017 repeat in 5 years  Mammogram--6/2017  neg  DEXA-----4/2016 nl  Pap-----RUI  Tdap---3/2014  Influenza vaccine---due  Shingles vaccine-----11/2016    Review of Systems   Constitutional: Negative for appetite change, fatigue, fever and unexpected weight change.   HENT: Negative for congestion, ear pain, hearing loss, sore throat and trouble swallowing.    Eyes: Negative for pain and visual disturbance.   Respiratory: Positive for shortness of breath. Negative for cough, chest tightness and wheezing.    Cardiovascular: Negative for chest pain, palpitations and leg swelling.   Gastrointestinal: Negative for abdominal pain, blood in stool, constipation, diarrhea,  "nausea and vomiting.   Endocrine: Negative for polyuria.   Genitourinary: Negative for dysuria and hematuria.   Musculoskeletal: Negative for arthralgias, back pain and myalgias.   Skin: Negative for rash.   Neurological: Positive for headaches. Negative for dizziness, weakness and numbness.   Hematological: Does not bruise/bleed easily.   Psychiatric/Behavioral: Negative for dysphoric mood, sleep disturbance and suicidal ideas. The patient is not nervous/anxious.        Objective:      Vitals:    10/25/17 1113   BP: 115/70   Pulse: 80   Resp: 16   Temp: 98.1 °F (36.7 °C)   TempSrc: Oral   Weight: 66 kg (145 lb 6.4 oz)   Height: 5' 9" (1.753 m)     Body mass index is 21.47 kg/m².  Physical Exam    General appearance: No acute distress, cooperative  Eyes: PERRL, EOMI, conjunctiva clear  Ears: normal external ear and pinna, tm clear without drainage, canals clear  Nose: Normal mucosa without drainage  Throat: no exudates or erythema, tonsils not enlarged  Mouth: no sores or lesions, moist mucous membranes, good dentition  Neck: FROM, soft, supple, no thyromegaly, no bruits  Lymph: no anterior or posterior cervical adenopathy  Heart::  Regular rate and rhythm, no murmur  Lung: Clear to ascultation bilaterally, no wheezing, no rales, no rhonchi, no distress  Abdomen: Soft, nontender, no distention, no hepatosplenomegaly, bowel sounds normal, no guarding, no rebound, no peritoneal signs  Skin: no rashes, no lesions  Extremities: no edema, no cyanosis  Neuro: CN 2-12 intact, 5/5 muscle strength upper and lower extremity bilaterally, 2+ DTRs UE and LE bilaterally, normal gait, normal sensation  Peripheral pulses: 2+ pedal pulses bilaterally, good perfusion and color  Musculoskeletal: FROM, good strenth, no tenderness  Joint: normal appearance, no swelling, no warmth, no deformity in all joints    Assessment:       1. YO (dyspnea on exertion)    2. Hyperlipidemia, unspecified hyperlipidemia type    3. Hypothyroidism, " unspecified type    4. Multinodular goiter    5. Major depressive disorder, recurrent, mild    6. Primary insomnia    7. Hormone replacement therapy (HRT)        Plan:       YO (dyspnea on exertion)  Continued shortness of breath with exertion that is effecting her ability to exericse.  Her cardiac workup was negative. She has normal spirometry. Will check CT scan for structural lung disease and V/Q scan for chronic thrombotic disease (she is on HRT). If this is all normal then will refer to pulmonology for their opinion.  Her pulmonary artery pressure has not been measured so she may need to have a repeat echo to have this done.    -     CT Chest With Contrast; Future; Expected date: 10/25/2017  -     NM Lung Ventilation Perfusion Imaging; Future; Expected date: 10/25/2017    Hyperlipidemia, unspecified hyperlipidemia type  Uncontrolled and not on treatment. Will recheck today.   -     Lipid panel  -     Comprehensive metabolic panel    Hypothyroidism, unspecified type  Good control on this regimen and will recheck TSH today.   -     TSH  -     T4, free    Multinodular goiter  STable and all her nodules are benign in appearance.     Major depressive disorder, recurrent, mild  Controlled on wellbutrin.     Primary insomnia  Controlled on OTC unisom. She tried trazodone but could not tolerate side effects.     Hormone replacement therapy (HRT)  Continues HRT managed by gyn.  I discussed her increase risk with clots and cancer but she wishes to continue.     Return in about 6 months (around 4/25/2018) for chronic medical issues.

## 2017-10-25 NOTE — PROGRESS NOTES
Venipuncture performed with 21 gauge butterfly, x's 2 attempt,  to Rt AC vein.  Specimens collected per orders.      Pressure dressing applied to site, instructed patient to remove dressing in 10-15 minutes, OK to re-adjust dressing if pressure causing any discomfort, to observe closely for numbness and/or discoloration to hand or fingers, and to notify provider if bleeding persists after applying constant pressure lasting 30 minutes.

## 2017-10-27 ENCOUNTER — PATIENT MESSAGE (OUTPATIENT)
Dept: FAMILY MEDICINE | Facility: CLINIC | Age: 62
End: 2017-10-27

## 2017-10-27 DIAGNOSIS — E03.9 HYPOTHYROIDISM, UNSPECIFIED TYPE: Primary | ICD-10-CM

## 2017-10-27 PROBLEM — R94.39 ABNORMAL STRESS TEST: Status: RESOLVED | Noted: 2017-05-15 | Resolved: 2017-10-27

## 2017-10-27 PROBLEM — R07.9 CHEST PAIN: Status: RESOLVED | Noted: 2017-06-30 | Resolved: 2017-10-27

## 2017-10-27 RX ORDER — LEVOTHYROXINE SODIUM 88 UG/1
88 TABLET ORAL DAILY
Qty: 30 TABLET | Refills: 11 | Status: SHIPPED | OUTPATIENT
Start: 2017-10-27 | End: 2017-11-01

## 2017-10-27 NOTE — TELEPHONE ENCOUNTER
[facility]  Changed levothyroxine dose. Recheck TSH in 6 weeks.

## 2017-11-01 ENCOUNTER — TELEPHONE (OUTPATIENT)
Dept: FAMILY MEDICINE | Facility: CLINIC | Age: 62
End: 2017-11-01

## 2017-11-01 RX ORDER — LEVOTHYROXINE SODIUM 100 UG/1
100 TABLET ORAL DAILY
Qty: 30 TABLET | Refills: 11
Start: 2017-11-01 | End: 2018-11-01

## 2017-11-01 NOTE — TELEPHONE ENCOUNTER
She wants to continue on levothyroxine 100 mcg despite low TSH and then recheck TSH in one month.    I agree to continue levothyroxine 100 mcg qday and recheck TSH in 1month before decreasing medication

## 2017-11-16 ENCOUNTER — TELEPHONE (OUTPATIENT)
Dept: FAMILY MEDICINE | Facility: CLINIC | Age: 62
End: 2017-11-16

## 2017-11-16 NOTE — TELEPHONE ENCOUNTER
"Patient states she was told during last office visit [place]     Please advise regarding testing/appt scheduling.   "

## 2017-11-16 NOTE — TELEPHONE ENCOUNTER
----- Message from Olya Cedeño sent at 11/16/2017  8:45 AM CST -----  Contact: self  Patient needs to speak to a nurse about some tests he was supposed top be taking   Please call back [phone]

## 2017-12-01 ENCOUNTER — PATIENT MESSAGE (OUTPATIENT)
Dept: FAMILY MEDICINE | Facility: CLINIC | Age: 62
End: 2017-12-01

## 2018-04-24 NOTE — PROGRESS NOTES
Subjective:       Patient ID: Katerina Dickson is a 62 y.o. female.    Medication List with Changes/Refills   New Medications    ALBUTEROL 90 MCG/ACTUATION INHALER    Inhale 2 puffs into the lungs every 6 (six) hours as needed for Wheezing. Rescue    CITALOPRAM (CELEXA) 20 MG TABLET    Take 1 tablet (20 mg total) by mouth once daily.   Current Medications    ACETAMINOPHEN (TYLENOL) 325 MG TABLET    Take 1 tablet (325 mg total) by mouth every 6 (six) hours as needed for Pain.    ASPIRIN 81 MG CHEW    Take 81 mg by mouth once daily.    BUPROPION (WELLBUTRIN XL) 300 MG 24 HR TABLET    Take 1 tablet (300 mg total) by mouth once daily.    DOXYLAMINE SUCCINATE (UNISOM ORAL)    Take 100 mg by mouth every evening. 2 tablets nightly    ESTRADIOL (VIVELLE-DOT) 0.075 MG/24 HR    APPLY 1 PATCH EXTERNALLY TO THE SKIN 2 TIMES A WEEK    LACTOBACILLUS RHAMNOSUS GG (CULTURELLE) 10 BILLION CELL CAPSULE    Take 1 capsule by mouth every evening.     LEVOTHYROXINE (SYNTHROID) 100 MCG TABLET    Take 1 tablet (100 mcg total) by mouth once daily.       Chief Complaint: No chief complaint on file.  She is here today to f/u on chronic medical issues.      Last year she started with shortness of breath with exercise.  She had a full normal cardiac workup including exercise stress test, cardiac cath which were normal. She had a full pulmonary workup with normal CXR, CT scan V/Q scan and PFTs.  She has since stopped exercising and does not get short of breath.  She denies coughing or chest pain.  She continues to take aspirin daily which she felt helped with her symptoms in the past.      She has depression that has been controlled since 2005 on wellbutrin 300 mg qday. She does notice increase sadness lately with crying spells.  She denies anxiety. She denies suicidal ideations. She is not sleeping well. She has a good appetite and has maintained her weight. She takes unisom every night to sleep to sleep because she has trouble falling asleep.       She has hypothyroid and is taking synthroid. Her last TSH was fast on 10/2017 at 0.300 but her medication was not changed. She was suppose to f/u with a recheck TSH in one month but did not get this done. She had a thyroid u/s done on 4/2017 that showed small nodules unchanged from u/s done on 4/2016 and none that meet FNA criteria.      She has elevated lipids but is not on treatment. Her last lipids were checked on 10/2017 were 250/130/59/165.       She is taking HRT patches since her RUI in 1995. She is managed by gyn.      She has GERD and is not currently on medication. She is doing very well and denies any symptoms. She had her last EGD in 2016 that showed some gastric polyps but otherwise normal. She does not complain of reflux symptoms today.       She was noted to have very low platelets that have now normalized. She was followed by hematology who feel that the platelets were artificially low due to clumping in the vial before processing. Repeat evaluation was normal.      She has a history or chronic pancreatitis that required hospitalization x 3. Her last episode was in 2006 where she was hospitalized for one month and had gallbladder removed. She has not had episode since.       Colonoscopy---10/2017 repeat in 5 years  Mammogram--6/2017  neg  DEXA-----4/2016 nl  Pap-----RUI  Tdap---3/2014  Influenza vaccine----9/2017  Shingles vaccine-----11/2016     Review of Systems   Constitutional: Positive for fatigue. Negative for appetite change, fever and unexpected weight change.   HENT: Negative for congestion, ear pain, hearing loss, sore throat and trouble swallowing.    Eyes: Negative for pain and visual disturbance.   Respiratory: Negative for cough, chest tightness, shortness of breath and wheezing.    Cardiovascular: Negative for chest pain, palpitations and leg swelling.   Gastrointestinal: Negative for abdominal pain, blood in stool, constipation, diarrhea, nausea and vomiting.   Endocrine:  "Negative for polyuria.   Genitourinary: Negative for dysuria and hematuria.   Musculoskeletal: Negative for arthralgias, back pain and myalgias.   Skin: Negative for rash.   Neurological: Positive for headaches. Negative for dizziness, weakness and numbness.   Hematological: Does not bruise/bleed easily.   Psychiatric/Behavioral: Positive for dysphoric mood and sleep disturbance. Negative for suicidal ideas. The patient is not nervous/anxious.        Objective:      Vitals:    04/25/18 1022   BP: 108/66   BP Location: Right arm   Patient Position: Sitting   BP Method: Large (Manual)   Pulse: 70   Resp: 18   Temp: 98 °F (36.7 °C)   TempSrc: Oral   SpO2: 97%   Weight: 66.3 kg (146 lb 2.6 oz)   Height: 5' 9" (1.753 m)     Body mass index is 21.58 kg/m².  Physical Exam    General appearance: No acute distress, cooperative  Neck: FROM, soft, supple, no thyromegaly, no bruits  Lymph: no anterior or posterior cervical adenopathy  Heart::  Regular rate and rhythm, no murmur  Lung: Clear to ascultation bilaterally, no wheezing, no rales, no rhonchi, no distress  Abdomen: Soft, nontender, no distention, no hepatosplenomegaly, bowel sounds normal, no guarding, no rebound, no peritoneal signs  Skin: no rashes, no lesions  Extremities: no edema, no cyanosis  Neuro: no focal abnormalities, strength 5/5 b/l UE and LE, 2+ DTRs b/l UE and LE, normal gait  Peripheral pulses: 2+ pedal pulses bilaterally, good perfusion and color  Musculoskeletal: FROM, good strenth, no tenderness  Joint: normal appearance, no swelling, no warmth, no deformity in all joints    Assessment:       1. Major depressive disorder, recurrent, mild    2. Primary insomnia    3. Hypothyroidism, unspecified type    4. Multinodular goiter    5. YO (dyspnea on exertion)    6. Hyperlipidemia, unspecified hyperlipidemia type    7. Hormone replacement therapy (HRT)        Plan:       Major depressive disorder, recurrent, mild   Uncontrolled. She had been on wellbutrin " now for over 15 years.  I would like to try her on an SSRI. She will start celexa 20 mg---1/2 pill qday x 1 week then 1 pill qday. She will continue on wellbutrin at her current dose. She will f/u in 4 weeks and if doing well then consider weaning her wellbutrin to off.   -     citalopram (CELEXA) 20 MG tablet; Take 1 tablet (20 mg total) by mouth once daily.  Dispense: 30 tablet; Refill: 11    Primary insomnia  Uncontrolled. She is using an OTC sleep aide. I am hoping celexa will help with sleep.     Hypothyroidism, unspecified type  Noted to be low (fast) at her last TSH but not changed (patient had been on that dose for many years and asked to continue and recheck). She never went for recheck so will get TSH today.   -     TSH; Future; Expected date: 04/25/2018    Multinodular goiter  STable. Will get a f/u ultrasound in 2019.      YO (dyspnea on exertion)  No obvious etiology and she is no longer exercising. I do not think that is the answer so I will do a trial of using albuterol 2 puffs 20 minutes before exercise to see if this help with her chest tightness and shortness of breath. She is to restart exercising and will f/u with me in 4 weeks.   -     albuterol 90 mcg/actuation inhaler; Inhale 2 puffs into the lungs every 6 (six) hours as needed for Wheezing. Rescue  Dispense: 18 g; Refill: 6    Hyperlipidemia, unspecified hyperlipidemia type  Uncontrolled and discuss dietary and lifestyle changes.     Hormone replacement therapy (HRT)  She continues to follow with gyn for HRT.     Follow-up in about 4 weeks (around 5/23/2018) for recheck depression and YO .

## 2018-04-25 ENCOUNTER — LAB VISIT (OUTPATIENT)
Dept: LAB | Facility: HOSPITAL | Age: 63
End: 2018-04-25
Attending: INTERNAL MEDICINE
Payer: COMMERCIAL

## 2018-04-25 ENCOUNTER — OFFICE VISIT (OUTPATIENT)
Dept: FAMILY MEDICINE | Facility: CLINIC | Age: 63
End: 2018-04-25
Payer: COMMERCIAL

## 2018-04-25 VITALS
SYSTOLIC BLOOD PRESSURE: 108 MMHG | HEIGHT: 69 IN | OXYGEN SATURATION: 97 % | BODY MASS INDEX: 21.65 KG/M2 | RESPIRATION RATE: 18 BRPM | TEMPERATURE: 98 F | DIASTOLIC BLOOD PRESSURE: 66 MMHG | WEIGHT: 146.19 LBS | HEART RATE: 70 BPM

## 2018-04-25 DIAGNOSIS — E03.9 HYPOTHYROIDISM, UNSPECIFIED TYPE: ICD-10-CM

## 2018-04-25 DIAGNOSIS — E78.5 HYPERLIPIDEMIA, UNSPECIFIED HYPERLIPIDEMIA TYPE: ICD-10-CM

## 2018-04-25 DIAGNOSIS — R06.09 DOE (DYSPNEA ON EXERTION): ICD-10-CM

## 2018-04-25 DIAGNOSIS — E04.2 MULTINODULAR GOITER: ICD-10-CM

## 2018-04-25 DIAGNOSIS — Z79.890 HORMONE REPLACEMENT THERAPY (HRT): ICD-10-CM

## 2018-04-25 DIAGNOSIS — F33.0 MAJOR DEPRESSIVE DISORDER, RECURRENT, MILD: Primary | ICD-10-CM

## 2018-04-25 DIAGNOSIS — F51.01 PRIMARY INSOMNIA: ICD-10-CM

## 2018-04-25 LAB — TSH SERPL DL<=0.005 MIU/L-ACNC: 1.14 UIU/ML

## 2018-04-25 PROCEDURE — 36415 COLL VENOUS BLD VENIPUNCTURE: CPT | Mod: PO

## 2018-04-25 PROCEDURE — 84443 ASSAY THYROID STIM HORMONE: CPT

## 2018-04-25 PROCEDURE — 99214 OFFICE O/P EST MOD 30 MIN: CPT | Mod: S$GLB,,, | Performed by: INTERNAL MEDICINE

## 2018-04-25 RX ORDER — ALBUTEROL SULFATE 90 UG/1
2 AEROSOL, METERED RESPIRATORY (INHALATION) EVERY 6 HOURS PRN
Qty: 18 G | Refills: 6 | Status: SHIPPED | OUTPATIENT
Start: 2018-04-25 | End: 2021-01-25

## 2018-04-25 RX ORDER — CITALOPRAM 20 MG/1
20 TABLET, FILM COATED ORAL DAILY
Qty: 30 TABLET | Refills: 11 | Status: SHIPPED | OUTPATIENT
Start: 2018-04-25 | End: 2018-05-23

## 2018-04-26 ENCOUNTER — PATIENT MESSAGE (OUTPATIENT)
Dept: FAMILY MEDICINE | Facility: CLINIC | Age: 63
End: 2018-04-26

## 2018-05-23 ENCOUNTER — OFFICE VISIT (OUTPATIENT)
Dept: FAMILY MEDICINE | Facility: CLINIC | Age: 63
End: 2018-05-23
Payer: COMMERCIAL

## 2018-05-23 VITALS
HEART RATE: 76 BPM | RESPIRATION RATE: 18 BRPM | DIASTOLIC BLOOD PRESSURE: 74 MMHG | SYSTOLIC BLOOD PRESSURE: 102 MMHG | HEIGHT: 69 IN | BODY MASS INDEX: 21.68 KG/M2 | TEMPERATURE: 98 F | WEIGHT: 146.38 LBS

## 2018-05-23 DIAGNOSIS — F33.0 MAJOR DEPRESSIVE DISORDER, RECURRENT, MILD: Primary | ICD-10-CM

## 2018-05-23 DIAGNOSIS — E03.9 HYPOTHYROIDISM, UNSPECIFIED TYPE: ICD-10-CM

## 2018-05-23 DIAGNOSIS — R06.09 DOE (DYSPNEA ON EXERTION): ICD-10-CM

## 2018-05-23 DIAGNOSIS — F51.01 PRIMARY INSOMNIA: ICD-10-CM

## 2018-05-23 PROCEDURE — 99214 OFFICE O/P EST MOD 30 MIN: CPT | Mod: S$GLB,,, | Performed by: INTERNAL MEDICINE

## 2018-05-23 PROCEDURE — 3008F BODY MASS INDEX DOCD: CPT | Mod: CPTII,S$GLB,, | Performed by: INTERNAL MEDICINE

## 2018-05-23 RX ORDER — ESCITALOPRAM OXALATE 10 MG/1
10 TABLET ORAL DAILY
Qty: 30 TABLET | Refills: 11 | Status: SHIPPED | OUTPATIENT
Start: 2018-05-23 | End: 2018-12-13

## 2018-05-23 NOTE — PROGRESS NOTES
Subjective:       Patient ID: Katerina Dickson is a 62 y.o. female.    Medication List with Changes/Refills   New Medications    ESCITALOPRAM OXALATE (LEXAPRO) 10 MG TABLET    Take 1 tablet (10 mg total) by mouth once daily.   Current Medications    ACETAMINOPHEN (TYLENOL) 325 MG TABLET    Take 1 tablet (325 mg total) by mouth every 6 (six) hours as needed for Pain.    ALBUTEROL 90 MCG/ACTUATION INHALER    Inhale 2 puffs into the lungs every 6 (six) hours as needed for Wheezing. Rescue    ASPIRIN 81 MG CHEW    Take 81 mg by mouth once daily.    BUPROPION (WELLBUTRIN XL) 300 MG 24 HR TABLET    Take 1 tablet (300 mg total) by mouth once daily.    DOXYLAMINE SUCCINATE (UNISOM ORAL)    Take 100 mg by mouth every evening. 2 tablets nightly    ESTRADIOL (VIVELLE-DOT) 0.075 MG/24 HR    APPLY 1 PATCH EXTERNALLY TO THE SKIN 2 TIMES A WEEK    LACTOBACILLUS RHAMNOSUS GG (CULTURELLE) 10 BILLION CELL CAPSULE    Take 1 capsule by mouth every evening.     LEVOTHYROXINE (SYNTHROID) 100 MCG TABLET    Take 1 tablet (100 mcg total) by mouth once daily.   Discontinued Medications    CITALOPRAM (CELEXA) 20 MG TABLET    Take 1 tablet (20 mg total) by mouth once daily.       Chief Complaint: Follow-up  She is here today to f/u on multiple issues from 4/25/2018.     She had uncontrolled depression on wellbutrin and on that appt she was started on celexa 20 mg qday.  She continues on wellbutrin 300 mg qday.  She says since starting celexa her depression is mildly improved but she still feels sad all the time. She has no energy and this fatigue has worsened on celexa. She is sleeping much better. No anxiety. No suicidal ideations.     She has YO and has not been able to exercise. Both cardiac and pulmonary workup were negative.  At her last appt she was started on albuterol to use prior to exericse. She said this did help with her shortness of breath but not her chest tightness. She is only using one puff.  No chest pain or palpitations.   "    She was noted to have a slow thyroid but on recheck on 4/2018 TSH was normal.      Review of Systems   Constitutional: Positive for fatigue. Negative for activity change, appetite change, chills and fever.   HENT: Negative for congestion, ear discharge, ear pain, mouth sores, postnasal drip, rhinorrhea, sinus pressure and sore throat.    Eyes: Negative for pain, discharge and redness.   Respiratory: Negative for cough, chest tightness, shortness of breath and wheezing.    Gastrointestinal: Negative for abdominal pain, constipation, diarrhea, nausea and vomiting.   Genitourinary: Negative for dysuria.   Musculoskeletal: Negative for arthralgias and neck stiffness.   Skin: Negative for rash.   Neurological: Negative for headaches.   Hematological: Negative for adenopathy.   Psychiatric/Behavioral: Positive for dysphoric mood. Negative for sleep disturbance and suicidal ideas. The patient is not nervous/anxious.        Objective:      Vitals:    05/23/18 1050   BP: 102/74   Pulse: 76   Resp: 18   Temp: 97.9 °F (36.6 °C)   Weight: 66.4 kg (146 lb 6.2 oz)   Height: 5' 9" (1.753 m)     Body mass index is 21.62 kg/m².  Physical Exam    General appearance: No acute distress, cooperative  Neck: FROM, soft, supple, no thyromegaly, no bruits  Lymph: no anterior or posterior cervical adenopathy  Heart::  Regular rate and rhythm, no murmur  Lung: Clear to ascultation bilaterally, no wheezing, no rales, no rhonchi, no distress  Abdomen: Soft, nontender, no distention, no hepatosplenomegaly, bowel sounds normal, no guarding, no rebound, no peritoneal signs  Skin: no rashes, no lesions  Extremities: no edema, no cyanosis  Peripheral pulses: 2+ pedal pulses bilaterally, good perfusion and color    Assessment:       1. Major depressive disorder, recurrent, mild    2. Primary insomnia    3. YO (dyspnea on exertion)    4. Hypothyroidism, unspecified type        Plan:       Major depressive disorder, recurrent, mild  Improved " depression since starting celexa but increasing fatigue. Will change from celexa 20 mg qday to lexapro 10 mg qday to see if we can get the antidepressants benefits with less fatigue.  Continue wellbutrin at this time.   -     escitalopram oxalate (LEXAPRO) 10 MG tablet; Take 1 tablet (10 mg total) by mouth once daily.  Dispense: 30 tablet; Refill: 11    Primary insomnia  Much improved with starting an SSRI.     YO (dyspnea on exertion)  Improved with albuterol prior to exercise. ADvised to increase to 2 puffs 20 minutes prior to exercise.     Hypothyroidism, unspecified type  Good control on this dose of levothyroxine.     Follow-up in about 4 months (around 9/23/2018) for chronic medical issues.

## 2018-08-26 DIAGNOSIS — F33.0 MAJOR DEPRESSIVE DISORDER, RECURRENT, MILD: ICD-10-CM

## 2018-08-26 DIAGNOSIS — E03.9 HYPOTHYROIDISM, UNSPECIFIED TYPE: ICD-10-CM

## 2018-08-27 RX ORDER — BUPROPION HYDROCHLORIDE 300 MG/1
TABLET ORAL
Qty: 90 TABLET | Refills: 3 | Status: SHIPPED | OUTPATIENT
Start: 2018-08-27 | End: 2019-04-17 | Stop reason: SDUPTHER

## 2018-08-27 RX ORDER — LEVOTHYROXINE SODIUM 100 UG/1
TABLET ORAL
Qty: 90 TABLET | Refills: 3 | Status: SHIPPED | OUTPATIENT
Start: 2018-08-27 | End: 2019-04-17 | Stop reason: SDUPTHER

## 2018-09-27 DIAGNOSIS — Z79.890 POSTMENOPAUSAL HRT (HORMONE REPLACEMENT THERAPY): ICD-10-CM

## 2018-09-28 RX ORDER — ESTRADIOL 0.07 MG/D
FILM, EXTENDED RELEASE TRANSDERMAL
Qty: 8 PATCH | Refills: 4 | Status: SHIPPED | OUTPATIENT
Start: 2018-09-28 | End: 2018-12-13 | Stop reason: SDUPTHER

## 2018-12-13 ENCOUNTER — OFFICE VISIT (OUTPATIENT)
Dept: OBSTETRICS AND GYNECOLOGY | Facility: CLINIC | Age: 63
End: 2018-12-13
Payer: COMMERCIAL

## 2018-12-13 VITALS
WEIGHT: 150.81 LBS | DIASTOLIC BLOOD PRESSURE: 82 MMHG | BODY MASS INDEX: 22.27 KG/M2 | SYSTOLIC BLOOD PRESSURE: 116 MMHG

## 2018-12-13 DIAGNOSIS — Z90.721 S/P HYSTERECTOMY WITH OOPHORECTOMY: ICD-10-CM

## 2018-12-13 DIAGNOSIS — Z79.890 POSTMENOPAUSAL HRT (HORMONE REPLACEMENT THERAPY): ICD-10-CM

## 2018-12-13 DIAGNOSIS — Z90.710 S/P HYSTERECTOMY WITH OOPHORECTOMY: ICD-10-CM

## 2018-12-13 DIAGNOSIS — Z01.419 ENCOUNTER FOR GYNECOLOGICAL EXAMINATION WITHOUT ABNORMAL FINDING: ICD-10-CM

## 2018-12-13 DIAGNOSIS — Z12.39 SCREENING FOR BREAST CANCER: Primary | ICD-10-CM

## 2018-12-13 PROCEDURE — 99396 PREV VISIT EST AGE 40-64: CPT | Mod: S$GLB,,, | Performed by: OBSTETRICS & GYNECOLOGY

## 2018-12-13 PROCEDURE — 99999 PR PBB SHADOW E&M-EST. PATIENT-LVL III: CPT | Mod: PBBFAC,,, | Performed by: OBSTETRICS & GYNECOLOGY

## 2018-12-13 RX ORDER — ESTRADIOL 0.07 MG/D
FILM, EXTENDED RELEASE TRANSDERMAL
Qty: 24 PATCH | Refills: 3 | Status: SHIPPED | OUTPATIENT
Start: 2018-12-13 | End: 2019-03-28 | Stop reason: SDUPTHER

## 2018-12-13 NOTE — PROGRESS NOTES
Chief Complaint   Patient presents with    Well Woman    Medication Refill     Chidi pt requesting BRAND ONLY 90 days 3 refills     History of Present Illness: Katerina Dickson is a 63 y.o. female that presents today 12/13/2018 for [facility] gyn visit.    Past Medical History:   Diagnosis Date    GERD (gastroesophageal reflux disease)     Hashimoto's thyroiditis 6/07    Hypothyroidism     Pancreatitis 1988,1995    Postmenopausal HRT (hormone replacement therapy)        Past Surgical History:   Procedure Laterality Date    CHOLECYSTECTOMY  2006    COLONOSCOPY  2012    every 5    COLONOSCOPY N/A 10/23/2017    Procedure: COLONOSCOPY;  Surgeon: Pankaj Shah MD;  Location: [facility];  Service: Endoscopy;  Laterality: N/A;    COLONOSCOPY N/A 10/23/2017    Performed by [facility] at [facility]O    ESOPHAGOGASTRODUODENOSCOPY      ESOPHAGOGASTRODUODENOSCOPY (EGD) N/A 7/19/2016    [facility] at [facility] END[facility]    EYE SURGERY Bilateral     cataract    HEART CATH-LEFT N/A 6/30/2017    [facility]    HYSTERECTOMY  1995    heavy periods    OOPHORECTOMY         Current Outpatient Medications   Medication Sig Dispense Refill    aspirin 81 MG Chew Take 81 mg by mouth once daily.      buPROPion (WELLBUTRIN XL) 300 MG 24 hr tablet TAKE 1 TABLET DAILY 90 tablet 3    estradiol (VIVELLE-DOT) 0.075 mg/24 hr APPLY 1 PATCH EXTERNALLY 2 TIMES A WEEK 24 patch 3    Lactobacillus rhamnosus GG (CULTURELLE) 10 billion cell capsule Take 1 capsule by mouth every evening.       SYNTHROID 100 mcg tablet TAKE 1 TABLET DAILY 90 tablet 3    acetaminophen (TYLENOL) 325 MG tablet Take 1 tablet (325 mg total) by mouth every 6 (six) hours as needed for Pain.      albuterol 90 mcg/actuation inhaler Inhale 2 puffs into the lungs every 6 (six) hours as needed for Wheezing. Rescue 18 g 6    DOXYLAMINE SUCCINATE (UNISOM ORAL) Take 100 mg by mouth every evening. 2 tablets  nightly       No current facility-administered medications for this visit.        Review of patient's allergies indicates:  No Known Allergies    Family History   Problem Relation Age of Onset    Breast cancer Mother 38         at 39 years of age    Arthritis Father     Breast cancer Maternal Grandmother     Heart disease Neg Hx        Social History     Socioeconomic History    Marital status:      Spouse name: None    Number of children: None    Years of education: None    Highest education level: None   Social Needs    Financial resource strain: None    Food insecurity - worry: None    Food insecurity - inability: None    Transportation needs - medical: None    Transportation needs - non-medical: None   Occupational History    None   Tobacco Use    Smoking status: Former Smoker     Types: Cigarettes     Last attempt to quit: 1995     Years since quittin.3    Smokeless tobacco: Never Used   Substance and Sexual Activity    Alcohol use: Yes     Alcohol/week: 5.4 oz     Types: 1 Glasses of wine, 1 Shots of liquor, 7 Standard drinks or equivalent per week     Comment: 2-3 drinks per day    Drug use: No    Sexual activity: Yes     Partners: Male     Birth control/protection: Surgical   Other Topics Concern    None   Social History Narrative    None       OB History    Para Term  AB Living   5 2 2   3 2   SAB TAB Ectopic Multiple Live Births   3       2      # Outcome Date GA Lbr Siddharth/2nd Weight Sex Delivery Anes PTL Lv   5 1989           4 1988           3 Term  40w0d  3.714 kg (8 lb 3 oz) F Vag-Spont None  LAZARA      Birth Comments: no complications   2 1984           1 Term  40w0d  3.345 kg (7 lb 6 oz) M Vag-Spont None  LAZARA      Birth Comments: no complications          Review of Symptoms:  GENERAL: Denies weight gain or weight loss. Feeling well overall.   SKIN: Denies rash or lesions.   HEAD: Denies head injury or headache.   NODES: Denies  enlarged lymph nodes.   CHEST: Denies chest pain or shortness of breath.   CARDIOVASCULAR: Denies palpitations or left sided chest pain.   ABDOMEN: No abdominal pain, constipation, diarrhea, nausea, vomiting or rectal bleeding.   URINARY: No frequency, dysuria, hematuria, or burning on urination.  HEMATOLOGIC: No easy bruisability or excessive bleeding.   MUSCULOSKELETAL: Denies joint pain or swelling.     /82   Wt 68.4 kg (150 lb 12.7 oz)   Physical Exam:  APPEARANCE: Well nourished, well developed, in no acute distress.  SKIN: Normal skin turgor, no lesions.  NECK: Neck symmetric without masses   RESPIRATORY: Normal respiratory effort with no retractions or use of accessory muscles  CARDIOVASCULAR: Peripheral vascular system with no swelling no varicosities and palpation of pulses normal  LYMPHATIC: No enlargements of the lymph nodes noted in the neck, axillae, or groin  ABDOMEN: Soft. No tenderness or masses. No hepatosplenomegaly. No hernias.  BREASTS: Symmetrical, no skin changes or visible lesions. No palpable masses, nipple discharge or adenopathy bilaterally.EXTREMITIES: No clubbing cyanosis or edema.    ASSESSMENT/PLAN:  Screening for breast cancer  -     Mammo Digital Screening Bilat With CAD; Future; Expected date: 12/13/2018    Postmenopausal HRT (hormone replacement therapy)  -     estradiol (VIVELLE-DOT) 0.075 mg/24 hr; APPLY 1 PATCH EXTERNALLY 2 TIMES A WEEK  Dispense: 24 patch; Refill: 3    Encounter for gynecological examination without abnormal finding    S/P hysterectomy with oophorectomy          Patient was counseled today on Pap guidelines, recommendation for yearly exams, mammograms every other year after the age of 40 and annually after the age of 50, Colonoscopy after the age of 50, Dexa Bone Scan and calcium and vitamin D supplementation in menopause and to see her PCP for other health maintenance.   FOLLOW-UP:prn

## 2018-12-18 ENCOUNTER — TELEPHONE (OUTPATIENT)
Dept: OBSTETRICS AND GYNECOLOGY | Facility: CLINIC | Age: 63
End: 2018-12-18

## 2018-12-18 NOTE — TELEPHONE ENCOUNTER
Please send medication listed below. Insurance doesn't cover the other patch.    Estradiol Trans Derm Patch 8, 0.075 mg

## 2018-12-18 NOTE — TELEPHONE ENCOUNTER
----- Message from Shahid Muñoz sent at 12/18/2018 10:56 AM CST -----  Contact: Cierra/[facility]  Cierra called in regarding the attached patient and stated patients insurance will not cover the Rx for estradiol (VIVELLE-DOT) 0.075 mg/24 hr and will have to get it replaced with the below approved medication.    Preferred Medication that is covered:    Estradiol Trans Derm Patch 8, 0.075 mg    Cierra's call back number is [phone], ref# [id number]

## 2018-12-19 NOTE — TELEPHONE ENCOUNTER
----- Message from Ny Olivia sent at 12/19/2018  9:18 AM CST -----  Contact: Porsche with [facility]  Type:  Pharmacy Calling to Clarify an RX    Name of Caller:  Porsche  Pharmacy Name:  [facility]  Prescription Name:  estradiol (VIVELLE-DOT) 0.075 mg/24 hr  What do they need to clarify?:  This prescription is a non-preferred prescription and the preferred medication is the Estradiol Transderm patch  Best Call Back Number:    Additional Information:  Ref#[id number]  This is a time sensitive matter.

## 2018-12-19 NOTE — TELEPHONE ENCOUNTER
Called [facility] and spoke to the pharmacist to authorize the prescription to be switched with the one that is covered.

## 2019-01-16 ENCOUNTER — HOSPITAL ENCOUNTER (OUTPATIENT)
Dept: RADIOLOGY | Facility: HOSPITAL | Age: 64
Discharge: HOME | End: 2019-01-16
Attending: OBSTETRICS & GYNECOLOGY
Payer: COMMERCIAL

## 2019-01-16 VITALS — BODY MASS INDEX: 22.22 KG/M2 | HEIGHT: 69 IN | WEIGHT: 150 LBS

## 2019-01-16 DIAGNOSIS — Z12.39 SCREENING FOR BREAST CANCER: ICD-10-CM

## 2019-01-16 PROCEDURE — 77063 MAMMO DIGITAL SCREENING BILAT WITH TOMOSYNTHESIS_CAD: ICD-10-PCS | Mod: 26,,, | Performed by: RADIOLOGY

## 2019-01-16 PROCEDURE — 77063 BREAST TOMOSYNTHESIS BI: CPT | Mod: 26,,, | Performed by: RADIOLOGY

## 2019-01-16 PROCEDURE — 77067 SCR MAMMO BI INCL CAD: CPT | Mod: 26,,, | Performed by: RADIOLOGY

## 2019-01-16 PROCEDURE — 77067 MAMMO DIGITAL SCREENING BILAT WITH TOMOSYNTHESIS_CAD: ICD-10-PCS | Mod: 26,,, | Performed by: RADIOLOGY

## 2019-01-16 PROCEDURE — 77067 SCR MAMMO BI INCL CAD: CPT | Mod: TC,PN

## 2019-03-28 DIAGNOSIS — Z79.890 POSTMENOPAUSAL HRT (HORMONE REPLACEMENT THERAPY): ICD-10-CM

## 2019-03-28 RX ORDER — ESTRADIOL 0.07 MG/D
FILM, EXTENDED RELEASE TRANSDERMAL
Qty: 24 PATCH | Refills: 3 | Status: SHIPPED | OUTPATIENT
Start: 2019-03-28 | End: 2020-03-05

## 2019-03-28 NOTE — TELEPHONE ENCOUNTER
----- Message from Dora Menchaca sent at 3/28/2019 12:52 PM CDT -----  Type: Needs Medical Advice    Who Called:  Patient  Best Call Back Number: [phone]  Additional Information: Patient requesting to  written prescription (hard copy) for medication: Vivelle/please call patient back to advise.

## 2019-04-17 DIAGNOSIS — E03.9 HYPOTHYROIDISM, UNSPECIFIED TYPE: ICD-10-CM

## 2019-04-17 DIAGNOSIS — F33.0 MAJOR DEPRESSIVE DISORDER, RECURRENT, MILD: ICD-10-CM

## 2019-04-17 RX ORDER — BUPROPION HYDROCHLORIDE 300 MG/1
300 TABLET ORAL DAILY
Qty: 90 TABLET | Refills: 3 | OUTPATIENT
Start: 2019-04-17 | End: 2019-04-18 | Stop reason: SDUPTHER

## 2019-04-17 RX ORDER — LEVOTHYROXINE SODIUM 100 UG/1
100 TABLET ORAL DAILY
Qty: 90 TABLET | Refills: 3 | OUTPATIENT
Start: 2019-04-17 | End: 2019-04-18 | Stop reason: SDUPTHER

## 2019-04-17 NOTE — TELEPHONE ENCOUNTER
----- Message from Hung Villatoro sent at 4/17/2019 11:03 AM CDT -----  Contact: pt  Type:  RX Refill Request    Who Called:  Pt  Refill or New Rx:  refill  RX Name and Strength:  SYNTHROID 100 mcg tablet  How is the patient currently taking it? (ex. 1XDay): TAKE 1 TABLET DAILY  Is this a 30 day or 90 day RX:  90  Preferred Pharmacy with phone number:  Pt would like to  a hard copy prescription instead of having it called in.  Local or Mail Order:  local  Ordering Provider:  Dr. Luke Jessica Call Back Number:  [phone]  Additional Information:  Pt would like to  a hard copy prescription instead of having it called in.    Type:  RX Refill Request    Who Called:  pt  Refill or New Rx:  refill  RX Name and Strength:  buPROPion (WELLBUTRIN XL) 300 MG 24 hr tablet  How is the patient currently taking it? (ex. 1XDay):  1 x day  Is this a 30 day or 90 day RX:  90  Preferred Pharmacy with phone number:   Pt would like to  a hard copy prescription instead of having it called in.  Local or Mail Order:  local  Ordering Provider:  Dr. Luke Jessica Call Back Number:  [phone]  Additional Information:   Pt would like to  a hard copy prescription instead of having it called in.

## 2019-04-18 RX ORDER — LEVOTHYROXINE SODIUM 100 UG/1
100 TABLET ORAL DAILY
Qty: 90 TABLET | Refills: 3 | Status: SHIPPED | OUTPATIENT
Start: 2019-04-18 | End: 2019-04-18 | Stop reason: SDUPTHER

## 2019-04-18 RX ORDER — LEVOTHYROXINE SODIUM 100 UG/1
100 TABLET ORAL DAILY
Qty: 90 TABLET | Refills: 3 | Status: SHIPPED | OUTPATIENT
Start: 2019-04-18 | End: 2019-06-10 | Stop reason: SDUPTHER

## 2019-04-18 RX ORDER — BUPROPION HYDROCHLORIDE 300 MG/1
300 TABLET ORAL DAILY
Qty: 90 TABLET | Refills: 3 | Status: SHIPPED | OUTPATIENT
Start: 2019-04-18 | End: 2019-04-18 | Stop reason: SDUPTHER

## 2019-04-18 RX ORDER — BUPROPION HYDROCHLORIDE 300 MG/1
300 TABLET ORAL DAILY
Qty: 90 TABLET | Refills: 3 | Status: SHIPPED | OUTPATIENT
Start: 2019-04-18 | End: 2020-06-07

## 2019-05-21 ENCOUNTER — OFFICE VISIT (OUTPATIENT)
Dept: FAMILY MEDICINE | Facility: CLINIC | Age: 64
End: 2019-05-21
Payer: COMMERCIAL

## 2019-05-21 VITALS
HEART RATE: 71 BPM | HEIGHT: 69 IN | WEIGHT: 148 LBS | SYSTOLIC BLOOD PRESSURE: 132 MMHG | BODY MASS INDEX: 21.92 KG/M2 | TEMPERATURE: 98 F | RESPIRATION RATE: 18 BRPM | OXYGEN SATURATION: 95 % | DIASTOLIC BLOOD PRESSURE: 78 MMHG

## 2019-05-21 DIAGNOSIS — G89.29 CHRONIC MIDLINE THORACIC BACK PAIN: Primary | ICD-10-CM

## 2019-05-21 DIAGNOSIS — M77.12 LEFT LATERAL EPICONDYLITIS: ICD-10-CM

## 2019-05-21 DIAGNOSIS — M25.50 ARTHRALGIA, UNSPECIFIED JOINT: ICD-10-CM

## 2019-05-21 DIAGNOSIS — Z13.220 ENCOUNTER FOR LIPID SCREENING FOR CARDIOVASCULAR DISEASE: ICD-10-CM

## 2019-05-21 DIAGNOSIS — M54.16 CHRONIC RADICULAR LUMBAR PAIN: ICD-10-CM

## 2019-05-21 DIAGNOSIS — E04.2 MULTINODULAR GOITER: ICD-10-CM

## 2019-05-21 DIAGNOSIS — M54.6 CHRONIC MIDLINE THORACIC BACK PAIN: Primary | ICD-10-CM

## 2019-05-21 DIAGNOSIS — G89.29 CHRONIC RADICULAR LUMBAR PAIN: ICD-10-CM

## 2019-05-21 DIAGNOSIS — M17.11 PRIMARY OSTEOARTHRITIS OF RIGHT KNEE: ICD-10-CM

## 2019-05-21 DIAGNOSIS — Z13.6 ENCOUNTER FOR LIPID SCREENING FOR CARDIOVASCULAR DISEASE: ICD-10-CM

## 2019-05-21 PROCEDURE — 99214 OFFICE O/P EST MOD 30 MIN: CPT | Mod: S$GLB,,, | Performed by: INTERNAL MEDICINE

## 2019-05-21 PROCEDURE — 99214 PR OFFICE/OUTPT VISIT, EST, LEVL IV, 30-39 MIN: ICD-10-PCS | Mod: S$GLB,,, | Performed by: INTERNAL MEDICINE

## 2019-05-21 RX ORDER — MELOXICAM 7.5 MG/1
7.5 TABLET ORAL DAILY
Qty: 30 TABLET | Refills: 2 | Status: SHIPPED | OUTPATIENT
Start: 2019-05-21 | End: 2019-05-29 | Stop reason: SDUPTHER

## 2019-05-21 NOTE — MEDICAL/APP STUDENT
[facility]  Family Medicine                                   History & Physical    Patient Name: Katerina Dickson  MRN: [id number]  Primary Care Provider: Kaitlyn Butler DO      SUBJECTIVE:     Chief Complaint: back pain    History of Present Illness:  Patient is a 63 y.o. female  has a past medical history of GERD (gastroesophageal reflux disease), Hashimoto's thyroiditis, Hypothyroidism, Pancreatitis, and Postmenopausal HRT (hormone replacement therapy)., presents with worsening back and joint pain for a month. She states that she has a history of back pain though it is progressing. She has lumbar back pain rated 7/10 that is a 'sharp pain'. She denies any hx of trauma. It is worse when standing. It is generally better in the morning and worsens throughout the day. She does exercise in the mornings, but states that her back is not aggravated by this. She has trouble bending. She has been taking Advil and laying down which helps. She reports pain in her neck as well, rated 7/10. She also has L arm and R leg numbness and burning that comes on randomly. She reports R knee pain, 5/10 with a locking sensation. She also has right thumb pain and left elbow pain with swelling. She denies pain/stiffness in the mornings on waking, but the pain does wake her up at night. She has been having some chronic fatigue, but is taking a vitamin supplement that has been helping. She denies any associated weakness.     Review of Systems:  Review of Systems   Constitutional: Negative for activity change, appetite change, chills, fatigue, fever and unexpected weight change.   Eyes: Negative for visual disturbance.   Respiratory: Positive for shortness of breath. Negative for cough.    Cardiovascular: Negative for chest pain, palpitations and leg swelling.   Gastrointestinal: Negative for abdominal distention, abdominal pain, blood in stool, constipation, diarrhea, nausea and vomiting.   Genitourinary: Negative for dysuria  and hematuria.   Musculoskeletal: Positive for arthralgias, back pain, joint swelling and neck pain. Negative for myalgias.   Skin: Negative for color change and rash.   Neurological: Positive for numbness (in L arm and R leg). Negative for weakness, light-headedness and headaches.   Psychiatric/Behavioral: Positive for sleep disturbance (Due to pain.). Negative for decreased concentration, dysphoric mood, self-injury and suicidal ideas. The patient is not nervous/anxious.        Past Medical History:   Diagnosis Date    GERD (gastroesophageal reflux disease)     Hashimoto's thyroiditis 6/07    Hypothyroidism     Pancreatitis 1988,1995    Postmenopausal HRT (hormone replacement therapy)        Past Surgical History:   Procedure Laterality Date    CHOLECYSTECTOMY  2006    COLONOSCOPY  2012    every 5    COLONOSCOPY N/A 10/23/2017    Performed by [facility]aj [facility] at [facility] ENDO    ESOPHAGOGASTRODUODENOSCOPY      ESOPHAGOGASTRODUODENOSCOPY (EGD) N/A 7/19/2016    Performed by [facility]aj Shah [facility] at [facility] ENDO    EYE SURGERY Bilateral     cataract    HEART CATH-LEFT N/A 6/30/2017    Per[facility] by Russ [facility]ca [facility] at [facility] CATH    HYSTERECTOMY  1995    heavy periods    OOPHORECTOMY         Review of patient's allergies indicates:  No Known Allergies    Prior to Admission medications    Medication Sig Start Date End Date Taking? Authorizing Provider   aspirin 81 MG Chew Take 81 mg by mouth once daily.   Yes Historical Provider, MD   buPROPion (WELLBUTRIN XL) 300 MG 24 hr tablet Take 1 tablet (300 mg total) by mouth once daily. 4/18/19  Yes Kaitlyn Butler, DO   DOXYLAMINE SUCCINATE (UNISOM ORAL) Take 100 mg by mouth every evening. 2 tablets nightly   Yes Historical Provider, MD   estradiol (VIVELLE-DOT) 0.075 mg/24 hr APPLY 1 PATCH EXTERNALLY 2 TIMES A WEEK 3/28/19  Yes Carla Moore MD   SYNTHROID 100 mcg tablet Take 1 tablet (100 mcg total) by mouth once daily. 4/18/19  Yes Kaitlyn Butler, DO    UNABLE TO FIND Adrenal: Fatigue Fighter   [place] [facility]   2 tablets daily   Yes Historical Provider, MD   acetaminophen (TYLENOL) 325 MG tablet Take 1 tablet (325 mg total) by mouth every 6 (six) hours as needed for Pain. 17   Russ Montes De Oca MD   albuterol 90 mcg/actuation inhaler Inhale 2 puffs into the lungs every 6 (six) hours as needed for Wheezing. Rescue 18  Kaitlyn Butler,    Lactobacillus rhamnosus GG (CULTURELLE) 10 billion cell capsule Take 1 capsule by mouth every evening.     Historical Provider, MD       Family History   Problem Relation Age of Onset    Breast cancer Mother 38         at 39 years of age    Arthritis Father     Breast cancer Maternal Grandmother     Heart disease Neg Hx        Social History     Tobacco Use    Smoking status: Former Smoker     Types: Cigarettes     Last attempt to quit: 1995     Years since quittin.7    Smokeless tobacco: Never Used   Substance Use Topics    Alcohol use: Yes     Alcohol/week: 5.4 oz     Types: 1 Glasses of wine, 1 Shots of liquor, 7 Standard drinks or equivalent per week     Comment: 2-3 drinks per day    Drug use: No            OBJECTIVE:     Vital Signs (Most Recent):  Temp: 97.7 °F (36.5 °C) (19)  Pulse: 71 (19)  Resp: 18 (19)  BP: 132/78 (19)  SpO2: 95 % (19)     Wt Readings from Last 1 Encounters:   19 67.1 kg (148 lb)       Physical Exam   Constitutional: She appears well-developed and well-nourished. No distress.   HENT:   Head: Normocephalic and atraumatic.   Eyes: Pupils are equal, round, and reactive to light. EOM are normal.   Cardiovascular: Normal rate, regular rhythm and normal heart sounds. Exam reveals no gallop and no friction rub.   No murmur heard.  Pulmonary/Chest: Effort normal and breath sounds normal. She has no wheezes. She has no rales.   Musculoskeletal:        Left elbow: She exhibits no swelling and no deformity.  Tenderness found.        Right knee: She exhibits normal range of motion, no swelling and no erythema.        Lumbar back: She exhibits tenderness. She exhibits no bony tenderness.   Para-spinal tenderness and tightness. Straight leg raise negative.  Knee exam limited by back pain. Pain extending from lumbar spine to gluteal muscles and hamstrings.           ASSESSMENT/PLAN:     Generalized OA  -Try Mobic qD for 30 days    Lateral epicondylitis   -Try Mobic qD for 30 days    Back pain  -X-ray thoracic and lumbar   -Possible MRI pending x-ray results  -auto-immune panel    Multinodular goitre  -repeat thyroid U/S    Preventative  -TSH  -CMP  -CBC  -Lipids    FU in 3-4 weeks    Cecilia Martin   Medical Student

## 2019-05-21 NOTE — PROGRESS NOTES
Subjective:       Patient ID: Katerina Dickson is a 63 y.o. female.    Medication List with Changes/Refills   New Medications    MELOXICAM (MOBIC) 7.5 MG TABLET    Take 1 tablet (7.5 mg total) by mouth once daily.   Current Medications    ACETAMINOPHEN (TYLENOL) 325 MG TABLET    Take 1 tablet (325 mg total) by mouth every 6 (six) hours as needed for Pain.    ALBUTEROL 90 MCG/ACTUATION INHALER    Inhale 2 puffs into the lungs every 6 (six) hours as needed for Wheezing. Rescue    ASPIRIN 81 MG CHEW    Take 81 mg by mouth once daily.    BUPROPION (WELLBUTRIN XL) 300 MG 24 HR TABLET    Take 1 tablet (300 mg total) by mouth once daily.    DOXYLAMINE SUCCINATE (UNISOM ORAL)    Take 100 mg by mouth every evening. 2 tablets nightly    ESTRADIOL (VIVELLE-DOT) 0.075 MG/24 HR    APPLY 1 PATCH EXTERNALLY 2 TIMES A WEEK    LACTOBACILLUS RHAMNOSUS GG (CULTURELLE) 10 BILLION CELL CAPSULE    Take 1 capsule by mouth every evening.     SYNTHROID 100 MCG TABLET    Take 1 tablet (100 mcg total) by mouth once daily.    UNABLE TO FIND    Adrenal: Fatigue Fighter   [place]   2 tablets daily       Chief Complaint: Back Pain (Mid to low back pain, Consistant for about three weeks)  She is here today with multiple complains of joint and back pain.     She reports chronic back pain intermittently for many years. Over the last month she reports more focal pain in her lower thoracic spine that is painful to touch.  Described as  2/10 to 7/10 with a squeezing sensation.  Worse with bending and standing. Better when she wakes in am and worsens through out the day.  Pain will occasionally radiate downward to her low back. The low back pain is worse with sitting and will radiate down her right leg. No weakness.  All her discomfort is improved with ibuprofen and better first thing in am.  She is still able to exercise and do yoga.      She has pain in her left elbow, right knee and right thumb. No injury or fall.  She occasionally gets  "swelling in her knee, but no warmth or erythema.  These joint pain have also worsened in the last month.  No falls. No fevers.  No changes in weight. No rashes. No changes in bowels. No travel. Nothing triggers the pain.     Review of Systems   Constitutional: Negative for appetite change, fatigue, fever and unexpected weight change.   HENT: Negative for congestion, ear pain, hearing loss, sore throat and trouble swallowing.    Eyes: Negative for pain and visual disturbance.   Respiratory: Negative for cough, chest tightness, shortness of breath and wheezing.    Cardiovascular: Negative for chest pain, palpitations and leg swelling.   Gastrointestinal: Negative for abdominal pain, blood in stool, constipation, diarrhea, nausea and vomiting.   Endocrine: Negative for polyuria.   Genitourinary: Negative for dysuria and hematuria.   Musculoskeletal: Positive for arthralgias, back pain and neck pain. Negative for myalgias.   Skin: Negative for rash.   Neurological: Negative for dizziness, weakness, numbness and headaches.   Hematological: Does not bruise/bleed easily.   Psychiatric/Behavioral: Negative for dysphoric mood, sleep disturbance and suicidal ideas. The patient is not nervous/anxious.        Objective:      Vitals:    05/21/19 0828   BP: 132/78   Pulse: 71   Resp: 18   Temp: 97.7 °F (36.5 °C)   TempSrc: Oral   SpO2: 95%   Weight: 67.1 kg (148 lb)   Height: 5' 9" (1.753 m)     Body mass index is 21.86 kg/m².  Physical Exam    General appearance: No acute distress, cooperative  Neck: FROM, soft, supple, no thyromegaly, no bruits  Lymph: no anterior or posterior cervical adenopathy  Heart::  Regular rate and rhythm, no murmur  Lung: Clear to ascultation bilaterally, no wheezing, no rales, no rhonchi, no distress  Abdomen: Soft, nontender, no distention, no hepatosplenomegaly, bowel sounds normal, no guarding, no rebound, no peritoneal signs  Skin: no rashes, no lesions  Extremities: no edema, no cyanosis  Neuro: " CN 2-12 intact, 5/5 muscle strength upper and lower extremity bilaterally, 2+ DTRs UE and LE bilaterally, normal gait, positive straight leg raising on left  Peripheral pulses: 2+ pedal pulses bilaterally, good perfusion and color  Musculoskeletal: FROM, good strenth, focal tenderness at T9-T11 spinous process with some paraspinal muscles tightness  Joint: normal appearance, no swelling, no warmth, no deformity in all joints, left epicondyle tender with palpation, knees b/l with crepitus    Assessment:       1. Chronic midline thoracic back pain    2. Chronic radicular lumbar pain    3. Arthralgia, unspecified joint    4. Left lateral epicondylitis    5. Primary osteoarthritis of right knee    6. Multinodular goiter    7. Encounter for lipid screening for cardiovascular disease        Plan:       Chronic midline thoracic back pain  I am concerned about the focal nature of her thoracic pain.  Will check xray and consider MRI depending on results.  Will start meloxicam to help with pain daily.   -     X-Ray Thoracic Spine 4 or more views; Future; Expected date: 05/21/2019  -     meloxicam (MOBIC) 7.5 MG tablet; Take 1 tablet (7.5 mg total) by mouth once daily.  Dispense: 30 tablet; Refill: 2    Chronic radicular lumbar pain  Radicular pain down right leg and will check Xray.   -     X-Ray Lumbar Spine Complete 5 View; Future; Expected date: 05/21/2019    Arthralgia, unspecified joint  Due to her recent flare of her joint pain, will check labs for RA or autoimmune process.   -     Comprehensive metabolic panel; Future; Expected date: 05/21/2019  -     CBC auto differential; Future; Expected date: 05/21/2019  -     TSH; Future; Expected date: 05/21/2019  -     WOODY Screen w/Reflex; Future; Expected date: 05/21/2019  -     Sedimentation rate; Future; Expected date: 05/21/2019  -     C-reactive protein; Future; Expected date: 05/21/2019  -     Rheumatoid factor; Future; Expected date: 05/21/2019    Left lateral  epicondylitis  Reassurance and supportive care. To consider referral for steroid injection if no improvement on meloxicam.     Primary osteoarthritis of right knee  Reassurance and supportive care.     Multinodular goiter  She is due to recheck thyroid u/s and labs.   -     US Soft Tissue Head Neck Thyroid; Future; Expected date: 05/21/2019    Encounter for lipid screening for cardiovascular disease  -     Lipid panel; Future; Expected date: 05/21/2019    Follow up in about 3 weeks (around 6/11/2019) for recheck back and joint pain.

## 2019-05-23 ENCOUNTER — HOSPITAL ENCOUNTER (OUTPATIENT)
Dept: RADIOLOGY | Facility: HOSPITAL | Age: 64
Discharge: HOME | End: 2019-05-23
Attending: INTERNAL MEDICINE
Payer: COMMERCIAL

## 2019-05-23 DIAGNOSIS — M54.6 CHRONIC MIDLINE THORACIC BACK PAIN: ICD-10-CM

## 2019-05-23 DIAGNOSIS — M54.16 CHRONIC RADICULAR LUMBAR PAIN: ICD-10-CM

## 2019-05-23 DIAGNOSIS — G89.29 CHRONIC MIDLINE THORACIC BACK PAIN: ICD-10-CM

## 2019-05-23 DIAGNOSIS — G89.29 CHRONIC RADICULAR LUMBAR PAIN: ICD-10-CM

## 2019-05-23 DIAGNOSIS — E04.2 MULTINODULAR GOITER: ICD-10-CM

## 2019-05-23 PROCEDURE — 72070 X-RAY EXAM THORAC SPINE 2VWS: CPT | Mod: TC,FY,PO

## 2019-05-23 PROCEDURE — 76536 US EXAM OF HEAD AND NECK: CPT | Mod: TC,PO

## 2019-05-23 PROCEDURE — 72110 XR LUMBAR SPINE COMPLETE 5 VIEW: ICD-10-PCS | Mod: 26,,, | Performed by: RADIOLOGY

## 2019-05-23 PROCEDURE — 72110 X-RAY EXAM L-2 SPINE 4/>VWS: CPT | Mod: 26,,, | Performed by: RADIOLOGY

## 2019-05-23 PROCEDURE — 76536 US SOFT TISSUE HEAD NECK THYROID: ICD-10-PCS | Mod: 26,,, | Performed by: RADIOLOGY

## 2019-05-23 PROCEDURE — 72070 X-RAY EXAM THORAC SPINE 2VWS: CPT | Mod: 26,,, | Performed by: RADIOLOGY

## 2019-05-23 PROCEDURE — 76536 US EXAM OF HEAD AND NECK: CPT | Mod: 26,,, | Performed by: RADIOLOGY

## 2019-05-23 PROCEDURE — 72070 XR THORACIC SPINE AP LATERAL: ICD-10-PCS | Mod: 26,,, | Performed by: RADIOLOGY

## 2019-05-23 PROCEDURE — 72110 X-RAY EXAM L-2 SPINE 4/>VWS: CPT | Mod: TC,FY,PO

## 2019-05-27 ENCOUNTER — PATIENT OUTREACH (OUTPATIENT)
Dept: ADMINISTRATIVE | Facility: HOSPITAL | Age: 64
End: 2019-05-27

## 2019-05-27 ENCOUNTER — PATIENT MESSAGE (OUTPATIENT)
Dept: FAMILY MEDICINE | Facility: CLINIC | Age: 64
End: 2019-05-27

## 2019-05-27 NOTE — PROGRESS NOTES
Health Maintenance Due   Topic Date Due    Shingles Vaccine (2 of 3) 01/24/2017     Chart review complete/scrubbed 05/27/2019  Future Appointments   Date Time Provider Department Center   6/10/2019 10:40 AM DO AVELINO [facility]

## 2019-05-29 ENCOUNTER — PATIENT MESSAGE (OUTPATIENT)
Dept: FAMILY MEDICINE | Facility: CLINIC | Age: 64
End: 2019-05-29

## 2019-05-29 DIAGNOSIS — M54.6 CHRONIC MIDLINE THORACIC BACK PAIN: ICD-10-CM

## 2019-05-29 DIAGNOSIS — G89.29 CHRONIC MIDLINE THORACIC BACK PAIN: ICD-10-CM

## 2019-05-29 RX ORDER — MELOXICAM 7.5 MG/1
7.5 TABLET ORAL 2 TIMES DAILY
Qty: 60 TABLET | Refills: 2 | Status: SHIPPED | OUTPATIENT
Start: 2019-05-29 | End: 2019-09-12 | Stop reason: SDUPTHER

## 2019-06-10 ENCOUNTER — OFFICE VISIT (OUTPATIENT)
Dept: FAMILY MEDICINE | Facility: CLINIC | Age: 64
End: 2019-06-10
Payer: COMMERCIAL

## 2019-06-10 ENCOUNTER — HOSPITAL ENCOUNTER (OUTPATIENT)
Dept: RADIOLOGY | Facility: HOSPITAL | Age: 64
Discharge: HOME | End: 2019-06-10
Attending: INTERNAL MEDICINE
Payer: COMMERCIAL

## 2019-06-10 VITALS
DIASTOLIC BLOOD PRESSURE: 64 MMHG | WEIGHT: 151 LBS | SYSTOLIC BLOOD PRESSURE: 126 MMHG | OXYGEN SATURATION: 96 % | TEMPERATURE: 98 F | HEART RATE: 78 BPM | RESPIRATION RATE: 18 BRPM | BODY MASS INDEX: 22.36 KG/M2 | HEIGHT: 69 IN

## 2019-06-10 DIAGNOSIS — E03.9 HYPOTHYROIDISM, UNSPECIFIED TYPE: ICD-10-CM

## 2019-06-10 DIAGNOSIS — F33.0 MAJOR DEPRESSIVE DISORDER, RECURRENT, MILD: ICD-10-CM

## 2019-06-10 DIAGNOSIS — M54.16 CHRONIC RADICULAR LUMBAR PAIN: ICD-10-CM

## 2019-06-10 DIAGNOSIS — Z23 NEED FOR SHINGLES VACCINE: ICD-10-CM

## 2019-06-10 DIAGNOSIS — M25.561 CHRONIC PAIN OF RIGHT KNEE: ICD-10-CM

## 2019-06-10 DIAGNOSIS — F51.01 PRIMARY INSOMNIA: ICD-10-CM

## 2019-06-10 DIAGNOSIS — G89.29 CHRONIC MIDLINE THORACIC BACK PAIN: Primary | ICD-10-CM

## 2019-06-10 DIAGNOSIS — G89.29 CHRONIC PAIN OF RIGHT KNEE: ICD-10-CM

## 2019-06-10 DIAGNOSIS — G89.29 CHRONIC RADICULAR LUMBAR PAIN: ICD-10-CM

## 2019-06-10 DIAGNOSIS — M54.6 CHRONIC MIDLINE THORACIC BACK PAIN: Primary | ICD-10-CM

## 2019-06-10 DIAGNOSIS — Z79.890 HORMONE REPLACEMENT THERAPY (HRT): ICD-10-CM

## 2019-06-10 DIAGNOSIS — E78.5 HYPERLIPIDEMIA, UNSPECIFIED HYPERLIPIDEMIA TYPE: ICD-10-CM

## 2019-06-10 DIAGNOSIS — R06.09 DOE (DYSPNEA ON EXERTION): ICD-10-CM

## 2019-06-10 PROCEDURE — 99214 OFFICE O/P EST MOD 30 MIN: CPT | Mod: S$GLB,,, | Performed by: INTERNAL MEDICINE

## 2019-06-10 PROCEDURE — 73562 X-RAY EXAM OF KNEE 3: CPT | Mod: 26,RT,, | Performed by: RADIOLOGY

## 2019-06-10 PROCEDURE — 99214 PR OFFICE/OUTPT VISIT, EST, LEVL IV, 30-39 MIN: ICD-10-PCS | Mod: S$GLB,,, | Performed by: INTERNAL MEDICINE

## 2019-06-10 PROCEDURE — 73560 X-RAY EXAM OF KNEE 1 OR 2: CPT | Mod: TC,FY,PO,RT

## 2019-06-10 PROCEDURE — 73560 XR KNEE ORTHO RIGHT: ICD-10-PCS | Mod: 26,RT,, | Performed by: RADIOLOGY

## 2019-06-10 PROCEDURE — 73562 XR KNEE ORTHO RIGHT: ICD-10-PCS | Mod: 26,RT,, | Performed by: RADIOLOGY

## 2019-06-10 PROCEDURE — 73560 X-RAY EXAM OF KNEE 1 OR 2: CPT | Mod: 26,RT,, | Performed by: RADIOLOGY

## 2019-06-10 RX ORDER — LEVOTHYROXINE SODIUM 100 UG/1
100 TABLET ORAL DAILY
Qty: 90 TABLET | Refills: 3 | Status: SHIPPED | OUTPATIENT
Start: 2019-06-10 | End: 2019-09-12 | Stop reason: SDUPTHER

## 2019-06-10 NOTE — PROGRESS NOTES
Subjective:       Patient ID: Katerina Dickson is a 63 y.o. female.    Medication List with Changes/Refills   New Medications    VARICELLA-ZOSTER GE-AS01B, PF, (SHINGRIX, PF,) 50 MCG/0.5 ML INJECTION    Inject 0.5 mLs into the muscle once. for 1 dose   Current Medications    ACETAMINOPHEN (TYLENOL) 325 MG TABLET    Take 1 tablet (325 mg total) by mouth every 6 (six) hours as needed for Pain.    ALBUTEROL 90 MCG/ACTUATION INHALER    Inhale 2 puffs into the lungs every 6 (six) hours as needed for Wheezing. Rescue    ASPIRIN 81 MG CHEW    Take 81 mg by mouth once daily.    BUPROPION (WELLBUTRIN XL) 300 MG 24 HR TABLET    Take 1 tablet (300 mg total) by mouth once daily.    DOXYLAMINE SUCCINATE (UNISOM ORAL)    Take 100 mg by mouth every evening. 2 tablets nightly    ESTRADIOL (VIVELLE-DOT) 0.075 MG/24 HR    APPLY 1 PATCH EXTERNALLY 2 TIMES A WEEK    LACTOBACILLUS RHAMNOSUS GG (CULTURELLE) 10 BILLION CELL CAPSULE    Take 1 capsule by mouth every evening.     MELOXICAM (MOBIC) 7.5 MG TABLET    Take 1 tablet (7.5 mg total) by mouth 2 (two) times daily.    UNABLE TO FIND    Adrenal: Fatigue Fighter   [place]   2 tablets daily   Changed and/or Refilled Medications    Modified Medication Previous Medication    LEVOTHYROXINE (SYNTHROID) 100 MCG TABLET SYNTHROID 100 mcg tablet       Take 1 tablet (100 mcg total) by mouth once daily.    Take 1 tablet (100 mcg total) by mouth once daily.       Chief Complaint: Follow-up (Follow up with labs prior)  She is here today to f/u on chronic medical issues.    She reports chronic back pain intermittently for many years. Over the last month she reports more focal pain in her lower thoracic spine that is painful to touch.  Described as  2/10 to 7/10 with a squeezing sensation.  Worse with bending and standing. Better when she wakes in am and worsens through out the day.  Pain will occasionally radiate downward to her low back. The low back pain is worse with sitting and will  radiate down her right leg. No weakness.  She had xrays of the thoracic and lumbar spine that showed arthritis.  She was given meloxicam one week ago which she is using bid. This has helped with her pain. Most of the pain had resolved but then she worked in her yard and recurred.      She has continued dyspnea on exertion.  She had a full normal cardiac workup including exercise stress test, cardiac cath which were normal. She had a full pulmonary workup with normal CXR, CT scan V/Q scan and PFTs.  She has since stopped exercising and does not get short of breath.  She denies coughing or chest pain.  She continues to take aspirin daily which she felt helped with her symptoms in the past.  She has restarted exercising but will stop when she is short of breath.      She has depression that has been controlled since 2005 on wellbutrin 300 mg qday. She feels this is helping with her symptoms. She denies anxiety. She denies suicidal ideations. She is not sleeping well. She has a good appetite and has maintained her weight. She takes unisom every night to sleep to sleep because she has trouble falling asleep.      She has hypothyroid and is taking synthroid. Her last TSH was normal on 5/2019. She had a thyroid u/s done on 5/2019 that was unchanged from previous year and no nodules that met criteria for bx.      She has mild hyperlipidemia and is not on treatment. Last lipids were on 5/2019 were 233/120/58/151.      She is taking HRT patches since her RUI in 1995. She is managed by gyn.      She has GERD and is taking OTC prevacid 15 mg qday. She feels this helps with her symptoms. She had her last EGD in 2016 that showed some gastric polyps but otherwise normal.        She was noted to have very low platelets that have now normalized. She was followed by hematology who feel that the platelets were artificially low due to clumping in the vial before processing. Repeat evaluation was normal.     She complains of continued  "right knee pain with rest and activity. She has some mild swelling. No history of injury or accidents.       She has a history or chronic pancreatitis that required hospitalization x 3. Her last episode was in 2006 where she was hospitalized for one month and had gallbladder removed. She has not had episode since.       Colonoscopy---10/2017 repeat in 5 years  Mammogram--1/2019  neg  DEXA-----4/2016 nl  Pap-----RUI  Tdap---3/2014  Influenza vaccine----9/2017  Shingles vaccine-----11/2016     Review of Systems   Constitutional: Negative for appetite change, fatigue, fever and unexpected weight change.   HENT: Negative for congestion, ear pain, hearing loss, sore throat and trouble swallowing.    Eyes: Negative for pain and visual disturbance.   Respiratory: Negative for cough, chest tightness, shortness of breath and wheezing.    Cardiovascular: Negative for chest pain, palpitations and leg swelling.   Gastrointestinal: Negative for abdominal pain, blood in stool, constipation, diarrhea, nausea and vomiting.   Endocrine: Negative for polyuria.   Genitourinary: Negative for dysuria and hematuria.   Musculoskeletal: Positive for arthralgias (right knee) and back pain. Negative for myalgias.   Skin: Negative for rash.   Neurological: Negative for dizziness, weakness, numbness and headaches.   Hematological: Does not bruise/bleed easily.   Psychiatric/Behavioral: Negative for dysphoric mood, sleep disturbance and suicidal ideas. The patient is not nervous/anxious.        Objective:      Vitals:    06/10/19 1054   BP: 126/64   Pulse: 78   Resp: 18   Temp: 97.5 °F (36.4 °C)   TempSrc: Oral   SpO2: 96%   Weight: 68.5 kg (151 lb)   Height: 5' 9" (1.753 m)     Body mass index is 22.3 kg/m².  Physical Exam    General appearance: No acute distress, cooperative  Eyes: PERRL, EOMI, conjunctiva clear  Ears: normal external ear and pinna, tm clear without drainage, canals clear  Nose: Normal mucosa without drainage  Throat: no " exudates or erythema, tonsils not enlarged  Mouth: no sores or lesions, moist mucous membranes  Neck: FROM, soft, supple, no thyromegaly, no bruits  Lymph: no anterior or posterior cervical adenopathy  Heart::  Regular rate and rhythm, no murmur  Lung: Clear to ascultation bilaterally, no wheezing, no rales, no rhonchi, no distress  Abdomen: Soft, nontender, no distention, no hepatosplenomegaly, bowel sounds normal, no guarding, no rebound, no peritoneal signs  Skin: no rashes, no lesions  Extremities: no edema, no cyanosis  Neuro: CN 2-12 intact, 5/5 muscle strength upper and lower extremity bilaterally, 2+ DTRs UE and LE bilaterally, normal gait, normal sensation  Peripheral pulses: 2+ pedal pulses bilaterally, good perfusion and color  Musculoskeletal: FROM, good strenth, no tenderness  Joint: normal appearance, no swelling, no warmth, no deformity in all joints, crepitus with ROM testing of right and left knee, tenderness on medial knee    Assessment:       1. Chronic midline thoracic back pain    2. Chronic radicular lumbar pain    3. Chronic pain of right knee    4. Hypothyroidism, unspecified type    5. Hyperlipidemia, unspecified hyperlipidemia type    6. Hormone replacement therapy (HRT)    7. Major depressive disorder, recurrent, mild    8. Primary insomnia    9. YO (dyspnea on exertion)    10. Need for shingles vaccine        Plan:       Chronic midline thoracic back pain and chronic radicular lumbar pain  Xray consistent with OA.  Will continue meloxicam and start PT. If she has continued pain despite PT then will get MRI and refer to pain management.   -     Ambulatory Referral to Physical/Occupational Therapy    Chronic pain of right knee  Referral made to orthopedics for evaluation and possible steroid injection if appropriate.   -     Cancel: X-Ray Knee Complete 4 Or More Views Right; Future; Expected date: 06/10/2019  -     Ambulatory referral to Orthopedics    Hypothyroidism, unspecified  type  Good control on this dose of levothyroxine.   -     levothyroxine (SYNTHROID) 100 MCG tablet; Take 1 tablet (100 mcg total) by mouth once daily.  Dispense: 90 tablet; Refill: 3    Hyperlipidemia, unspecified hyperlipidemia type  She has done well with dietary changes. She is not on treatment.     Hormone replacement therapy (HRT)  She is managed by gyn. Discussed the risks of long term HRT use.     Major depressive disorder, recurrent, mild  Controlled on wellbutrin.     Primary insomnia  Controlled on OTC sleep aide.     YO (dyspnea on exertion)  STable. Workup has been negative.  Continue to monitor.     Need for shingles vaccine  -     varicella-zoster gE-AS01B, PF, (SHINGRIX, PF,) 50 mcg/0.5 mL injection; Inject 0.5 mLs into the muscle once. for 1 dose  Dispense: 0.5 mL; Refill: 1    Follow up in about 6 months (around 12/10/2019) for chronic medical issues.

## 2019-06-11 ENCOUNTER — OFFICE VISIT (OUTPATIENT)
Dept: ORTHOPEDICS | Facility: CLINIC | Age: 64
End: 2019-06-11
Payer: COMMERCIAL

## 2019-06-11 VITALS
HEIGHT: 69 IN | HEART RATE: 77 BPM | SYSTOLIC BLOOD PRESSURE: 123 MMHG | BODY MASS INDEX: 21.92 KG/M2 | DIASTOLIC BLOOD PRESSURE: 75 MMHG | WEIGHT: 148 LBS

## 2019-06-11 DIAGNOSIS — M17.11 PRIMARY OSTEOARTHRITIS OF RIGHT KNEE: Primary | ICD-10-CM

## 2019-06-11 DIAGNOSIS — M25.861 PATELLOFEMORAL DYSFUNCTION, RIGHT: ICD-10-CM

## 2019-06-11 PROCEDURE — 99204 OFFICE O/P NEW MOD 45 MIN: CPT | Mod: 25,S$GLB,, | Performed by: ORTHOPAEDIC SURGERY

## 2019-06-11 PROCEDURE — 20610 LARGE JOINT ASPIRATION/INJECTION: R KNEE: ICD-10-PCS | Mod: RT,S$GLB,, | Performed by: ORTHOPAEDIC SURGERY

## 2019-06-11 PROCEDURE — 99999 PR PBB SHADOW E&M-EST. PATIENT-LVL III: CPT | Mod: PBBFAC,,, | Performed by: ORTHOPAEDIC SURGERY

## 2019-06-11 PROCEDURE — 99204 PR OFFICE/OUTPT VISIT, NEW, LEVL IV, 45-59 MIN: ICD-10-PCS | Mod: 25,S$GLB,, | Performed by: ORTHOPAEDIC SURGERY

## 2019-06-11 PROCEDURE — 99999 PR PBB SHADOW E&M-EST. PATIENT-LVL III: ICD-10-PCS | Mod: PBBFAC,,, | Performed by: ORTHOPAEDIC SURGERY

## 2019-06-11 PROCEDURE — 20610 DRAIN/INJ JOINT/BURSA W/O US: CPT | Mod: RT,S$GLB,, | Performed by: ORTHOPAEDIC SURGERY

## 2019-06-11 RX ADMIN — TRIAMCINOLONE ACETONIDE 40 MG: 40 INJECTION, SUSPENSION INTRA-ARTICULAR; INTRAMUSCULAR at 03:06

## 2019-06-11 NOTE — LETTER
June 21, 2019      Kaitlyn Butler DO  [address] [address]  [address]  [facility] [address]           [facility]  [address] [facility] [address] [place] [address]  Phone: [phone]          Patient: Katerina Dickson   MR Number: [id number]   YOB: 1955   Date of Visit: 6/11/2019       Dear Dr. Kaitlyn Butler:    Thank you for referring Katerina Dickson to me for evaluation. Attached you will find relevant portions of my assessment and plan of care.    If you have questions, please do not hesitate to call me. I look forward to following Katerina Dickson along with you.    Sincerely,    Nav Brower MD    Enclosure  CC:  No Recipients    If you would like to receive this communication electronically, please contact [email or web address] or [phone] to request more information on [employer] Link access.    For providers and/or their staff who would like to refer a patient to [facility], please contact us through our one-stop-shop provider referral line, Nikki Mota, at [phone].    If you feel you have received this communication in error or would no longer like to receive these types of communications, please e-mail [email or web address]

## 2019-06-11 NOTE — PROGRESS NOTES
Chief Complaint   Patient presents with    Knee Pain     right knee pain         HPI:   This is a 63 y.o. who presents to clinic today complaining of right knee pain for 9 months after no known trauma. Pain is progressively worsening. No numbness or tingling. No associated signs or symptoms.    Past Medical History:   Diagnosis Date    GERD (gastroesophageal reflux disease)     Hashimoto's thyroiditis 6/07    Hypothyroidism     Pancreatitis 1988,1995    Postmenopausal HRT (hormone replacement therapy)      Past Surgical History:   Procedure Laterality Date    CHOLECYSTECTOMY  2006    COLONOSCOPY  2012    every 5    COLONOSCOPY N/A 10/23/2017    Performed by [facility]ankaj Shah [facility] at [facility] ENDO    ESOPHAGOGASTRODUODENOSCOPY      ESOPHAGOGASTRODUODENOSCOPY (EGD) N/A 7/19/2016    Performed by Pankaj Shah [facility] at [facility] ENDO    EYE SURGERY Bilateral     cataract    HEART CATH-LEFT N/A 6/30/2017    Performed by Russ [facility]a [facility] at [facility] CATH    HYSTERECTOMY  1995    heavy periods    OOPHORECTOMY       Current Outpatient Medications on File Prior to Visit   Medication Sig Dispense Refill    acetaminophen (TYLENOL) 325 MG tablet Take 1 tablet (325 mg total) by mouth every 6 (six) hours as needed for Pain.      aspirin 81 MG Chew Take 81 mg by mouth once daily.      buPROPion (WELLBUTRIN XL) 300 MG 24 hr tablet Take 1 tablet (300 mg total) by mouth once daily. 90 tablet 3    DOXYLAMINE SUCCINATE (UNISOM ORAL) Take 100 mg by mouth every evening. 2 tablets nightly      estradiol (VIVELLE-DOT) 0.075 mg/24 hr APPLY 1 PATCH EXTERNALLY 2 TIMES A WEEK 24 patch 3    Lactobacillus rhamnosus GG (CULTURELLE) 10 billion cell capsule Take 1 capsule by mouth every evening.       levothyroxine (SYNTHROID) 100 MCG tablet Take 1 tablet (100 mcg total) by mouth once daily. 90 tablet 3    meloxicam (MOBIC) 7.5 MG tablet Take 1 tablet (7.5 mg total) by mouth 2 (two) times daily. 60 tablet 2    UNABLE TO FIND Adrenal:  Fatigue Fighter   [place] [employer]   2 tablets daily      albuterol 90 mcg/actuation inhaler Inhale 2 puffs into the lungs every 6 (six) hours as needed for Wheezing. Rescue 18 g 6    [] varicella-zoster [id number], PF, (SHINGRIX, PF,) 50 mcg/0.5 mL injection Inject 0.5 mLs into the muscle once. for 1 dose 0.5 mL 1     No current facility-administered medications on file prior to visit.      Review of patient's allergies indicates:  No Known Allergies  Family History   Problem Relation Age of Onset    Breast cancer Mother 38         at 39 years of age    Arthritis Father     Breast cancer Maternal Grandmother     Heart disease Neg Hx      Social History     Socioeconomic History    Marital status:      Spouse name: Not on file    Number of children: Not on file    Years of education: Not on file    Highest education level: Not on file   Occupational History    Not on file   Social Needs    Financial resource strain: Not on file    Food insecurity:     Worry: Not on file     Inability: Not on file    Transportation needs:     Medical: Not on file     Non-medical: Not on file   Tobacco Use    Smoking status: Former Smoker     Types: Cigarettes     Last attempt to quit: 1995     Years since quittin.8    Smokeless tobacco: Never Used   Substance and Sexual Activity    Alcohol use: Yes     Alcohol/week: 5.4 oz     Types: 1 Glasses of wine, 1 Shots of liquor, 7 Standard drinks or equivalent per week     Comment: 2-3 drinks per day    Drug use: No    Sexual activity: Yes     Partners: Male     Birth control/protection: Surgical   Lifestyle    Physical activity:     Days per week: Not on file     Minutes per session: Not on file    Stress: Not on file   Relationships    Social connections:     Talks on phone: Not on file     Gets together: Not on file     Attends Advent service: Not on file     Active member of club or organization: Not on file     Attends meetings of  clubs or organizations: Not on file     Relationship status: Not on file   Other Topics Concern    Not on file   Social History Narrative    Not on file       Review of Systems:  Constitutional:  Denies fever or chills   Eyes:  Denies change in visual acuity   HENT:  Denies nasal congestion or sore throat   Respiratory:  Denies cough or shortness of breath   Cardiovascular:  Denies chest pain or edema   GI:  Denies abdominal pain, nausea, vomiting, bloody stools or diarrhea   :  Denies dysuria   Integument:  Denies rash   Neurologic:  Denies headache, focal weakness or sensory changes   Endocrine:  Denies polyuria or polydipsia   Lymphatic:  Denies swollen glands   Psychiatric:  Denies depression or anxiety     Physical Exam:   Constitutional:  Well developed, well nourished, no acute distress, non-toxic appearance   Integument:  Well hydrated, no rash   Lymphatic:  No lymphadenopathy noted   Neurologic:  Alert & oriented x 3  Psychiatric:  Speech and behavior appropriate     Bilateral Knee Exam    right Knee Exam     Tenderness   The patient is experiencing tenderness in the medial joint line.    Range of Motion   Extension: abnormal   Flexion: abnormal     Muscle Strength     The patient has normal knee strength.    Tests   Jaspal:  Medial - positive   Lachman:  Anterior - negative      Varus: negative  Valgus: negative  Patellar Apprehension: negative    Other   Erythema: absent  Sensation: normal  Pulse: present  Swelling: mild      left Knee Exam   left knee exam performed same as contralateral side and is normal.            X-rays were performed yesterday, personally reviewed by me and findings discussed with the patient.  4 views of the bilateral knee show tricompartmental degenerative change most pronounced in the medial compartment    Primary osteoarthritis of right knee  -     Ambulatory referral to Physical Therapy    Patellofemoral dysfunction, right  -     Ambulatory referral to Physical  Therapy            Using an aseptic technique, I injected 5 cc of lidocaine 1% without and 1 cc of kenalog 40mg into the right knee. The patient tolerated this well. PT referral for HEP today.  I will have them return to clinic as needed.

## 2019-06-11 NOTE — PROCEDURES
Large Joint Aspiration/Injection: R knee  Date/Time: 6/11/2019 3:50 PM  Performed by: Nav Brower MD  Authorized by: Nav Brower MD     Consent Done?:  Yes (Verbal)  Indications:  Pain  Procedure site marked: Yes    Timeout: Prior to procedure the correct patient, procedure, and site was verified      Location:  Knee  Site:  R knee  Prep: Patient was prepped and draped in usual sterile fashion    Needle size:  21 G  Approach:  Anterolateral  Medications:  40 mg triamcinolone acetonide 40 mg/mL; 40 mg triamcinolone acetonide 40 mg/mL  Patient tolerance:  Patient tolerated the procedure well with no immediate complications

## 2019-06-17 ENCOUNTER — CLINICAL SUPPORT (OUTPATIENT)
Dept: REHABILITATION | Facility: HOSPITAL | Age: 64
End: 2019-06-17
Attending: ORTHOPAEDIC SURGERY
Payer: COMMERCIAL

## 2019-06-17 DIAGNOSIS — R53.1 WEAKNESS: ICD-10-CM

## 2019-06-17 DIAGNOSIS — M25.561 RIGHT KNEE PAIN, UNSPECIFIED CHRONICITY: ICD-10-CM

## 2019-06-17 PROCEDURE — 97110 THERAPEUTIC EXERCISES: CPT | Mod: PO | Performed by: PHYSICAL THERAPIST

## 2019-06-17 PROCEDURE — 97161 PT EVAL LOW COMPLEX 20 MIN: CPT | Mod: PO | Performed by: PHYSICAL THERAPIST

## 2019-06-17 NOTE — PATIENT INSTRUCTIONS
Step-Down / Step-Up        Stand on stair step or ____ inch stool. Slowly bend left leg, lowering other foot to floor. Return by straightening front leg.  Repeat ____ times per set. Do ____ sets per session. Do ____ sessions per day.     [email or web address]     Copyright © [employer]. All rights reserved.   Self-Mobilization: Upward Kneecap Pull        With thumbs on lower border of left kneecap, gently pull kneecap toward hip. Hold ____ seconds.  Repeat ____ times per set. Do ____ sets per session. Do ____ sessions per day.     [email or web address]     Copyright © [employer]. All rights reserved.   Strengthening: Straight Leg Raise (Phase 1)        Tighten muscles on front of right thigh, then lift leg ____ inches from surface, keeping knee locked.   Repeat ____ times per set. Do ____ sets per session. Do ____ sessions per day.     [email or web address]     Copyright © [employer]. All rights reserved.   Functional Quadriceps: Sit to Stand        Sit on edge of chair, feet flat on floor. Stand upright, extending knees fully.  Repeat ____ times per set. Do ____ sets per session. Do ____ sessions per day.     [email or web address]     Copyright © [employer]. All rights reserved.   Knee Extension (Sitting)        Place ____ pound weight on left ankle and straighten knee fully, lower slowly.  Repeat ____ times per set. Do ____ sets per session. Do ____ sessions per day.     [email or web address]     Copyright © [employer]. All rights reserved.

## 2019-06-17 NOTE — PLAN OF CARE
[facility]  Physical Therapy Initial Evaluation    Name: Katerina Dickson  Clinic Number: [id number]    Therapy Diagnosis:   Encounter Diagnoses   Name Primary?    Right knee pain, unspecified chronicity     Weakness      Physician: Nav Brower MD    Physician Orders: PT Eval and Treat   Medical Diagnosis from Referral: Primary osteoarthritis of right knee  Evaluation Date: 6/17/2019  Authorization Period Expiration: 06/10/2020  Plan of Care Expiration: 07/29/2019  Visit # / Visits authorized: 1/ 1    Time In: 2:00 PM   Time Out: 2:52 PM   Total Billable Time: 52 minutes    Precautions: Standard    Subjective   Date of onset: 6/11/2019  History of current condition - Jenn reports: A several history of right knee pain. She has recently noticed weakness, increased pain levels,  difficulty squatting and getting up from the floor. Her pain is intermittent in nature and worsened with sudden movements and lots of squatting. She does not typically have any pain first thing in the morning. She saw the orthopedist and was given an injection. She did have a reduction in pain following the injection.       Past Medical History:   Diagnosis Date    GERD (gastroesophageal reflux disease)     Hashimoto's thyroiditis 6/07    Hypothyroidism     Pancreatitis 1988,1995    Postmenopausal HRT (hormone replacement therapy)      Katerina Dickson  has a past surgical history that includes Cholecystectomy (2006); Oophorectomy; Hysterectomy (1995); Colonoscopy (2012); Esophagogastroduodenoscopy; Eye surgery (Bilateral); and Colonoscopy (N/A, 10/23/2017).    Katerina has a current medication list which includes the following prescription(s): acetaminophen, albuterol, aspirin, bupropion, doxylamine succinate, estradiol, lactobacillus rhamnosus gg, levothyroxine, meloxicam, and UNABLE TO FIND.    Review of patient's allergies indicates:  No Known Allergies     Imaging: x-rays--> Mild degenerative changes in the  right knee    Prior Therapy: For neck pain   Social History:  lives with their spouse, in a two story home, but she doesn't go upstairs   Occupation: self employed   Prior Level of Function: No limitations prior to worsening of symptoms   Current Level of Function: Difficulty with prolonged sitting, difficulty with squatting activities and difficulty getting up from floor     Pain:  Current 2/10, worst 8/10, best 0/10   Location: right knee   Description: Throbbing and Shooting  Aggravating Factors: Sitting and squatting   Easing Factors: rest and stretching     Pts goals: Be able to to continue exercising (jogging, cycling)     Objective   Mental status: oriented x3  Posture/ Alignment: Fair, laterally tilted patella bilaterally     GAIT DEVIATIONS: Katerina amb with midlly antalgic gait .    ROM:   AROM Right Left Comment   Knee Extension: 34 degrees 0 degrees    Knee Flexion: 135 degrees 135 degrees          PROM Right Left    Knee Extension: 0 degrees NT degrees    Knee Flexion: NA degrees NA degrees    *pain  Strength:      Right Left Comment   Hip Flexion: 5/5 5/5    Hip ABD: 4/5 4/5    Hip Extension: 5/5 5/5    Knee Ext: 4/5 5/5    Knee Flex: 5/5 5/5      GIRTH:   Right Left   At joint line: 13 1/2 in 12 1/2 in     Functional Tests (* indicates w/ pain)   Squat: able to squat too 60 degrees with anterior lean and valgus collapse; stops due to pain/weakness  SLS R: 10 seconds mild sway   SLS L: 10 seconds without sway     Palpation:  TTP over medial joint line     Joint Play:  Hypomobile right patella     Pt/family was provided educational information, including: role of PT, goals for PT, scheduling - pt verbalized understanding. Discussed insurance plan with pt.     CMS Impairment/Limitation/Restriction for FOTO knee Survey    Therapist reviewed FOTO scores for Katerina Dickson on 6/17/2019.   FOTO documents entered into [employer] - see Media section.    Limitation Score: 34%  Category: Mobility           TREATMENT    Treatment Time In: 2:40 PM   Treatment Time Out: 2:52 PM   Total Treatment time separate from Evaluation: 12 minutes    Jenn received therapeutic exercises to develop strength for 12 minutes including:  HEP setup and instruction; handout issued. Patient demonstrated all exercises     Home Exercises and Patient Education Provided    Education provided:   - Pathophysiology of condition   - HEP   - POC    Written Home Exercises Provided: yes.  Exercises were reviewed and Jenn was able to demonstrate them prior to the end of the session.  Jenn demonstrated good  understanding of the education provided.     See EMR under Patient Instructions for exercises provided 6/17/2019.    Assessment    Pt presents with a medical diagnosis of Primary osteoarthritis of right knee. She has mildly limited right knee ROM, weakness, swelling and pain. These impairments are leading to reduced participation in exercise activities and difficulty performing activities that require squatting. She will benefit from physical therapy treatment to assist in reducing impairments and improving function.     Pt prognosis is Excellent.   Pt will benefit from skilled outpatient Physical Therapy to address the deficits stated above and in the chart below, provide pt/family education, and to maximize pt's level of independence.     Plan of care discussed with patient: Yes  Pt's spiritual, cultural and educational needs considered and patient is agreeable to the plan of care and goals as stated below:     Anticipated Barriers for therapy: None at this time     Medical Necessity is demonstrated by the following  History  Co-morbidities and personal factors that may impact the plan of care Co-morbidities:   depression    Personal Factors:   no deficits     low   Examination  Body Structures and Functions, activity limitations and participation restrictions that may impact the plan of care Body Regions:   lower extremities    Body Systems:    gross  symmetry  ROM  strength  balance  edema    Participation Restrictions:       Activity limitations:   Learning and applying knowledge  no deficits    General Tasks and Commands  no deficits    Communication  no deficits    Mobility  ascending/descending stairs     Self care  no deficits    Domestic Life  doing house work (cleaning house, washing dishes, laundry)    Interactions/Relationships  no deficits    Life Areas  no deficits    Community and Social Life  recreation and leisure         moderate   Clinical Presentation stable and uncomplicated low   Decision Making/ Complexity Score: low     Goals:  Short Term Goals: 3 weeks   1) Pt. Will be I with established HEP   2) Right knee extension ROM will be 0 degrees  3) Pain will be 6/10 or less during all ADL activities     Long Term Goals: 6 weeks   1) Strength will improve by 1 grade   2) Jenn will participate in all exercise related activities with pain no worse then 2/10   3) Jenn will perform a squat to 90 degrees with good technique and minimal pain to improve ease of household cleaning       Plan   Plan of care Certification: 6/17/2019 to 07/29/2019.    Outpatient Physical Therapy 2 times weekly for 6 weeks to include the following interventions: Manual Therapy, Neuromuscular Re-ed, Patient Education, Therapeutic Activites and Therapeutic Exercise.     Jermaine Manzano, PT

## 2019-06-20 ENCOUNTER — CLINICAL SUPPORT (OUTPATIENT)
Dept: REHABILITATION | Facility: HOSPITAL | Age: 64
End: 2019-06-20
Attending: ORTHOPAEDIC SURGERY
Payer: COMMERCIAL

## 2019-06-20 DIAGNOSIS — M25.561 RIGHT KNEE PAIN, UNSPECIFIED CHRONICITY: ICD-10-CM

## 2019-06-20 DIAGNOSIS — R53.1 WEAKNESS: ICD-10-CM

## 2019-06-20 PROCEDURE — 97110 THERAPEUTIC EXERCISES: CPT | Mod: PO | Performed by: PHYSICAL THERAPIST

## 2019-06-20 NOTE — PROGRESS NOTES
"  Physical Therapy Daily Treatment Note     Name: Katerina Dickson  Clinic Number: [id number]    Therapy Diagnosis:   Encounter Diagnoses   Name Primary?    Right knee pain, unspecified chronicity     Weakness      Physician: Nav Brower MD    Visit Date: 6/20/2019  Physician Orders: PT Eval and Treat   Medical Diagnosis from Referral: Primary osteoarthritis of right knee  Evaluation Date: 6/17/2019  Authorization Period Expiration: 06/10/2020  Plan of Care Expiration: 07/29/2019  Visit # / Visits authorized: 1/ 30     Time In: 12:00 PM   Time Out: 12:52 PM   Total Billable Time: 30 minutes    Precautions: Standard    Subjective     Pt reports: That she did well with her HEP. I have been doing a lot of similar exercises previously.  She was compliant with home exercise program.  Response to previous treatment: Initial eval   Functional change: Too soon to tell     Pain: 2/10  Location: right knee      Objective     Jenn received therapeutic exercises to develop strength, endurance and ROM for 52 minutes including:  Upright bike 7' to improve muscle endurance  Gastroc stretch at slant board 4x30"  Step downs 2x10   Precor leg press 3x10 60#  Precor leg press single leg 3x10 20# ea   LAQ 3x10 2#  SL hip abduction 2x10 2# B   Clamshells 2x10 green theraband B   Bridges with band 2x10     Home Exercises Provided and Patient Education Provided     Education provided:   - On possibility of post exercise soreness    Written Home Exercises Provided: Patient instructed to cont prior HEP.  Exercises were reviewed and Jenn was able to demonstrate them prior to the end of the session.  Jenn demonstrated good  understanding of the education provided.     See EMR under Patient Instructions for exercises provided 6/20/2019.    Assessment     Able to perform all requested exercises without an increase in knee pain. She did have improved patella mobility today. This is likely due to a decrease in swelling. She will benefit from " continued focus on strengthening to reduce symptoms.   Jenn is progressing well towards her goals.   Pt prognosis is Excellent.     Pt will continue to benefit from skilled outpatient physical therapy to address the deficits listed in the problem list box on initial evaluation, provide pt/family education and to maximize pt's level of independence in the home and community environment.     Pt's spiritual, cultural and educational needs considered and pt agreeable to plan of care and goals.    Anticipated barriers to physical therapy: None at this time     Goals:     Short Term Goals: 3 weeks   1) Pt. Will be I with established HEP   2) Right knee extension ROM will be 0 degrees  3) Pain will be 6/10 or less during all ADL activities      Long Term Goals: 6 weeks   1) Strength will improve by 1 grade   2) Jenn will participate in all exercise related activities with pain no worse then 2/10   3) Jenn will perform a squat to 90 degrees with good technique and minimal pain to improve ease of household cleaning        Plan     Continue progression of strengthening     Jermaine Manzano, PT

## 2019-06-21 RX ORDER — TRIAMCINOLONE ACETONIDE 40 MG/ML
40 INJECTION, SUSPENSION INTRA-ARTICULAR; INTRAMUSCULAR
Status: DISCONTINUED | OUTPATIENT
Start: 2019-06-11 | End: 2019-06-21 | Stop reason: HOSPADM

## 2019-06-25 ENCOUNTER — CLINICAL SUPPORT (OUTPATIENT)
Dept: REHABILITATION | Facility: HOSPITAL | Age: 64
End: 2019-06-25
Attending: ORTHOPAEDIC SURGERY
Payer: COMMERCIAL

## 2019-06-25 DIAGNOSIS — M25.561 RIGHT KNEE PAIN, UNSPECIFIED CHRONICITY: ICD-10-CM

## 2019-06-25 DIAGNOSIS — R53.1 WEAKNESS: ICD-10-CM

## 2019-06-25 PROCEDURE — 97110 THERAPEUTIC EXERCISES: CPT | Mod: PO

## 2019-06-25 PROCEDURE — 97140 MANUAL THERAPY 1/> REGIONS: CPT | Mod: PO

## 2019-06-25 NOTE — PROGRESS NOTES
"  Physical Therapy Daily Treatment Note     Name: Katerina Dickson  Clinic Number: [id number]    Therapy Diagnosis:   Encounter Diagnoses   Name Primary?    Right knee pain, unspecified chronicity     Weakness      Physician: Nav Brower MD    Visit Date: 6/25/2019  Physician Orders: PT Eval and Treat   Medical Diagnosis from Referral: Primary osteoarthritis of right knee  Evaluation Date: 6/17/2019  Authorization Period Expiration: 06/10/2020  Plan of Care Expiration: 07/29/2019  Visit # / Visits authorized: 2/ 30     Time In: 1500  Time Out: 1600  Total Billable Time: 60 minutes    Precautions: Standard    Subjective     Pt reports: her knee is pain free this afternoon. Patient states she did have muscle soreness in her thighs following last treatment session. She was compliant with home exercise program.  Response to previous treatment: muscle soreness  Functional change: Too soon to tell     Pain: 2/10  Location: right knee      Objective     Jenn received therapeutic exercises to develop strength, endurance and ROM for 50 minutes including:  Upright bike 7' to improve muscle endurance  Gastroc stretch at slant board 4x30"  Step downs 2x10   Precor leg press 3x10 60#  Precor leg press single leg 3x10 20# ea   LAQ 3x10, 3# (with ball squeeze for VMO activation)  Quad set into SLR 2x10, 3 sec hold ea position  SL hip abduction 3x10 2# B   Clamshells 2x10 green theraband B   Bridges with green band 2x10     Jenn received manual therapy consisting of: R patellar mobilizations and McConnel taping to correct lateral tilt x 10 minutes  -removed tape before end of session to due increased "clicking" through lateral knee    Home Exercises Provided and Patient Education Provided     Education provided:   - On possibility of post exercise soreness    Written Home Exercises Provided: Patient instructed to cont prior HEP.  Exercises were reviewed and Jenn was able to demonstrate them prior to the end of the session.  Jenn " "demonstrated good  understanding of the education provided.     See EMR under Patient Instructions for exercises provided 6/20/2019.    Assessment     Jenn able to perform all exercises without complaints of right knee pain. Training effect easily achieved in gluteal musculature but she is able to maintain proper form as muscles fatigue. Attempted McConnel taping to promote medial tracking and reduce tilt but this increased lateral patella "popping and clicking." Tape removed and these symptoms resolved. Initiated SLR and VMO strengthening to improve quad recruitment.   Jnen is progressing well towards her goals.   Pt prognosis is Excellent.     Pt will continue to benefit from skilled outpatient physical therapy to address the deficits listed in the problem list box on initial evaluation, provide pt/family education and to maximize pt's level of independence in the home and community environment.     Pt's spiritual, cultural and educational needs considered and pt agreeable to plan of care and goals.    Anticipated barriers to physical therapy: None at this time     Goals:     Short Term Goals: 3 weeks   1) Pt. Will be I with established HEP   2) Right knee extension ROM will be 0 degrees  3) Pain will be 6/10 or less during all ADL activities      Long Term Goals: 6 weeks   1) Strength will improve by 1 grade   2) Jenn will participate in all exercise related activities with pain no worse then 2/10   3) Jenn will perform a squat to 90 degrees with good technique and minimal pain to improve ease of household cleaning        Plan     Continue progression of strengthening     Liliana Greenwood, PTA  "

## 2019-07-05 ENCOUNTER — CLINICAL SUPPORT (OUTPATIENT)
Dept: REHABILITATION | Facility: HOSPITAL | Age: 64
End: 2019-07-05
Payer: COMMERCIAL

## 2019-07-05 DIAGNOSIS — M54.50 CHRONIC BILATERAL LOW BACK PAIN WITHOUT SCIATICA: ICD-10-CM

## 2019-07-05 DIAGNOSIS — G89.29 CHRONIC BILATERAL LOW BACK PAIN WITHOUT SCIATICA: ICD-10-CM

## 2019-07-05 PROCEDURE — 97162 PT EVAL MOD COMPLEX 30 MIN: CPT | Mod: PO | Performed by: PHYSICAL THERAPIST

## 2019-07-05 NOTE — PLAN OF CARE
[facility] - PHYSICAL THERAPY EVALUATION     Name: Katerina Dickson  Clinic Number: [id number]    Therapy Diagnosis:   Encounter Diagnosis   Name Primary?    Chronic bilateral low back pain without sciatica      Physician: Kaitlyn Butler DO    Physician Orders: PT Eval and Treat OHB lumbar  Medical Diagnosis from Referral: M54.16,G89.29 (ICD-10-CM) - Chronic radicular lumbar pain  Evaluation Date: 7/5/2019  Authorization Period Expiration: 12/31/19  Plan of Care Expiration: 8/16/19  Reassessment Due: 7/26/19  Visit # / Visits authorized: 1/ 1    Time In: 1215 (pt did not finish check in process when she arrived at 12)  Time Out: 130p  Total Billable Time: 75 minutes    Precautions: Standard    Pattern of pain determined: PEP1      Subjective   Date of onset: years ago, then would go away.  History of current condition - Jenn reports: Middle to low back pain, feels like the middle back pain is what triggers the lower back pain. Pt thinks that the back pain became flared up recently due to knee pain, which caused her to bend through her back more, leading to constant low back. Knee pain is chronic, as well. Pt states the mid back when first wakes up, exercises, and bends she gets pain, sometimes sharp, sometimes constant and dull.      Medical History:   Past Medical History:   Diagnosis Date    GERD (gastroesophageal reflux disease)     Hashimoto's thyroiditis 6/07    Hypothyroidism     Pancreatitis 1988,1995    Postmenopausal HRT (hormone replacement therapy)        Surgical History:   Katerina Dickson  has a past surgical history that includes Cholecystectomy (2006); Oophorectomy; Hysterectomy (1995); Colonoscopy (2012); Esophagogastroduodenoscopy; Eye surgery (Bilateral); and Colonoscopy (N/A, 10/23/2017).    Medications:   Katerina has a current medication list which includes the following prescription(s): acetaminophen, albuterol, aspirin, bupropion, doxylamine succinate, estradiol, lactobacillus  rhamnosus gg, levothyroxine, meloxicam, and UNABLE TO FIND.    Allergies:   Review of patient's allergies indicates:  No Known Allergies     Imaging, xray:   Lumbar: The lumbar vertebral bodies show normal height and alignment without evidence of acute compression fracture or osseous destructive process.  There is a minimal grade I anterolisthesis of L5 on S 1.  No definite pars defects.  There is degenerative facet arthropathy at L4-L5 and L5-S1.There are surgical clips in the right upper quadrant of the abdomen which likely relate to previous cholecystectomy.    Thoracic: There is minor degenerative change of the thoracic spine in the form of marginal osteophyte formation and degenerative disc space narrowing.  There is a remote superior endplate compression deformity at T11 resulting in no more than 15% maximal height loss, unchanged when compared with a CT of the chest performed 10/25/2017.  No acute thoracic compression fracture or osseous destructive process.  The incidentally observed soft tissues of the chest are unremarkable.  There is incidentally observed degenerative change of the cervical spine, most pronounced at C4-C5 where disc space narrowing and marginal osteophyte formation are observed.    Prior Therapy: yes, for her knee. Being discharged b/c it has helped.   Prior Treatment: not for back   Social History: has 2 story home, does not go up.  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  Where is your pain the worst? Midback  2.  Is your pain constant or intermittent? Intermittent  3.  Does bending forward make your typical pain worse? yes  4.  Since the start of your back pain, has there been a change in your bowel or bladder? no  5.  What can't you do now that you use to be able to do? yardwork      Pts goals: be able to move without pain      Red Flag Screening:   Cough  Sneeze  Strain: (--)  Bladder/ bowel: (--)  Falls: (--)  Night pain: (--)  Unexplained weight loss: (--)  General health: excellent    OBJECTIVE     Postural examination/scapula alignment: Rounded shoulder and Head forward  Sitting: slumped, sacral sitting  Standing: reduced lumbar lordosis  Correction of posture: better with lumbar roll    MOVEMENT LOSS      Flexion 30* P! T/L Jxn, knees flexed   Extension 12*   Side bending Right 15*   Side bending Left 15*   Rotation Right 50%, smooth curve   Rotation Left 50%, fulcrum at        MMT RIGHT LEFT   Hip flex 4+ 4+   Hip ext 4+ 4   Hip abd 5 5   Hip add 5 5   Knee flex 4+ 4+   Knee ext 5 5   DF 5 5           GAIT:  Assistive Device used: none  Level of Assistance: independent  Patient displays the following gait deviations:  no gait deviations observed.     Special Tests:   Test Name  Test Result   Prone Instability Test (--)   Neural Tension Test (--)       NEUROLOGICAL SCREENING     Sensory deficit: none    Reflexes:    Left Right   Patella Tendon 2+ 2+   Achilles Tendon 2+ 2+     REPEATED TEST MOVEMENTS:  Repeated Flexion in Standing worse   Repeated Extension in Standing better       STATIC TESTS   Sitting slouched  worse   Sitting erect better       Baseline Isometric Testing on Med X equipment: Testing administered by PT  Date of testin19  ROM 12-42 deg   Max Peak Torque     Min Peak Torque    Flex/Ext Ratio    % below normative data          CMS  Impairment/Limitation/Restriction for FOTO lumbar Survey    Therapist reviewed FOTO scores for Katerina Dickson on 7/5/2019.   FOTO documents entered into [employer] - see Media section.    Limitation Score: 38%  Category: Mobility             Education provided:   - Patient received education regarding proper posture and body mechanics.  Patient was given top 10 tips handout which discusses posture seated, standing, lifting correctly, components of exercise, importance of nutrition and hydration, and importance of sleep.  - Alexis roll tried, recommended, and purchase information was provided.    - Patient received a handout regarding anticipated muscular soreness following the isometric test and strategies for management were reviewed with patient including stretching, using ice and scheduled rest.   - Patient received education on the [facility] program, purpose of the isometric test, progression of back strengthening as well as wellness approach and systemic strengthening.  Details of the program were discussed.  Reviewed that patient should feel support/pressure from med ex restraints but no pain or discomfort and patient expressed understanding.    Jenn received cold pack for 0 minutes (pt refused).    Assessment   Katerina is a 63 y.o. female referred to [facility] with a medical diagnosis of chronic lumbar and thoracic pain. Pt presents with strength and ROM of the lumbar spine below normative data for her age, which is likely contributing to her pain.     Pain Pattern: PEP1       Pt prognosis is Good.   Pt will benefit from skilled outpatient Physical Therapy to address the deficits stated above and in the chart below, provide pt/family education, and to maximize pt's level of independence. Based on the above history and physical examination an active physical therapy program is recommended.  Pt will continue to benefit from skilled outpatient physical therapy to address the deficits listed below in the  chart, provide pt/family education and to maximize pt's level of independence in the home and community environment. .       Plan of care discussed with patient: Yes  Pt's spiritual, cultural and educational needs considered and patient is agreeable to the plan of care and goals as stated below:     Anticipated Barriers for therapy: none    PT Evaluation Completed? Yes    Medical necessity is demonstrated by the following problem list.    Pt presents with the following impairments:     History  Co-morbidities and personal factors that may impact the plan of care Co-morbidities:   GERD, hypothyroidism, pancreatitis    Personal Factors:   no deficits     low   Examination  Body Structures and Functions, activity limitations and participation restrictions that may impact the plan of care Body Regions:   back    Body Systems:    ROM  strength  motor control    Participation Restrictions:   Flexion based activities    Activity limitations:   Learning and applying knowledge  no deficits    General Tasks and Commands  no deficits    Communication  no deficits    Mobility  lifting and carrying objects  walking    Self care  no deficits    Domestic Life  doing house work (cleaning house, washing dishes, laundry)    Interactions/Relationships  no deficits    Life Areas  employment    Community and Social Life  community life  recreation and leisure         moderate   Clinical Presentation stable and uncomplicated low   Decision Making/ Complexity Score: moderate       GOALS: Pt is in agreement with the following goals.    Short term goals:  6 weeks or 10 visits   1.  Pt will demonstrate increased lumbar ROM by at least 10 degrees from the initial ROM value with improvements noted in functional ROM and ability to perform ADLs.  2.  Pt will demonstrate increased maximum isometric torque value by 25% when compared to the initial value resulting in improved ability to perform bending, lifting, and carrying activities safely,  "confidently.    3.  Patient report a reduction in worst pain score by 1-2 points for improved tolerance for gardening.  4.  Pt able to perform HEP correctly with minimal cueing or supervision from therapist to encourage independent management of symptoms.       Long term goals: 13 weeks or 20 visits   1. Pt will demonstrate increased lumbar ROM by at least 15 degrees from initial ROM value, resulting in improved ability to perform functional fwd bending while standing and sitting.   2. Pt will demonstrate increased maximum isometric torque value by 50% when compared to the initial value resulting in improved ability to perform bending, lifting, and carrying activities safely, confidently.  3. Pt to demonstrate ability to independently control and reduce their pain through posture positioning and mechanical movements throughout a typical day.  4.  Pt will demonstrate reduced pain and improved functional outcomes as reported on the FOTO by reaching a score of CI = at least 1% but < 20% impaired, limited or restricted or less in order to demonstrate subjective improvement in pt's condition.    5. Pt will demonstrate independence with the HEP at discharge        Plan   Outpatient physical therapy 2x week for 10 weeks or 20 visits to include the following:   - Patient education  - Therapeutic exercise  - Manual therapy  - Performance testing   - Neuromuscular Re-education  - Therapeutic activity   - Modalities    Pt may be seen by PTA as part of the rehabilitation team.     Therapist: Isa Fuller, PT  7/5/2019    "I certify the need for these services furnished under this plan of treatment and while under my care."    ____________________________________  Physician/Referring Practitioner    _______________  Date of Signature          "

## 2019-07-08 NOTE — PATIENT INSTRUCTIONS
Strengthening: Hip Abduction (Side-Lying)        Tighten muscles on front of left thigh, then lift leg ____ inches from surface, keeping knee locked.   Repeat ____ times per set. Do ____ sets per session. Do ____ sessions per day.     [email or web address]://[email or web address]622     Copyright © [employer]. All rights reserved.   Strengthening: Hip Abductor - Resisted        With band looped around both legs above knees, push thighs apart.  Repeat ____ times per set. Do ____ sets per session. Do ____ sessions per day.     https://[email or web address]     Copyright © [employer]. All rights reserved.   Bridging        Slowly raise buttocks from floor, keeping stomach tight.  Repeat ____ times per set. Do ____ sets per session. Do ____ sessions per day.     https://[email or web address]     Copyright © [employer]. All rights reserved.   Gastroc Stretch        Stand with right foot back, leg straight, forward leg bent. Keeping heel on floor, turned slightly out, lean into wall until stretch is felt in calf. Hold ____ seconds.  Repeat ____ times per set. Do ____ sets per session. Do ____ sessions per day.     [email or web address]://[email or web address]26     Copyright © [employer]. All rights reserved.

## 2019-07-10 ENCOUNTER — CLINICAL SUPPORT (OUTPATIENT)
Dept: REHABILITATION | Facility: HOSPITAL | Age: 64
End: 2019-07-10
Payer: COMMERCIAL

## 2019-07-10 DIAGNOSIS — G89.29 CHRONIC BILATERAL LOW BACK PAIN WITHOUT SCIATICA: ICD-10-CM

## 2019-07-10 DIAGNOSIS — M54.50 CHRONIC BILATERAL LOW BACK PAIN WITHOUT SCIATICA: ICD-10-CM

## 2019-07-10 PROCEDURE — 97140 MANUAL THERAPY 1/> REGIONS: CPT | Mod: PO | Performed by: PHYSICAL THERAPIST

## 2019-07-10 PROCEDURE — 97110 THERAPEUTIC EXERCISES: CPT | Mod: PO | Performed by: PHYSICAL THERAPIST

## 2019-07-10 NOTE — PROGRESS NOTES
[facility] Physical Therapy Treatment      Name: Katerina Dickson  Clinic Number: [id number]    Therapy Diagnosis:   Encounter Diagnosis   Name Primary?    Chronic bilateral low back pain without sciatica      Physician: Nav Brower MD    Visit Date: 7/10/2019    Physician Orders: PT Eval and Treat OHB lumbar  Medical Diagnosis from Referral: M54.16,G89.29 (ICD-10-CM) - Chronic radicular lumbar pain  Evaluation Date: 2019  Authorization Period Expiration: 19  Plan of Care Expiration: 19  Reassessment Due: 19  Visit # / Visits authorized:     Time In: 1107 (pt arrived late)  Time Out: 1210  Total Billable Time: 53 minutes    Precautions: Standard    Pattern of pain determined: PEP1    Subjective   Katerina reports her back felt tight after first session, however her R knee pain woke her up in the night, which she attributes to her using her legs during testing.       Patient reports tolerating previous visit well in re: to low back.  Patient reports their pain to be 0/10 on a 0-10 scale with 0 being no pain and 10 being the worst pain imaginable.  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[employer]  Leisure: biking, swimming, gardening, reading   Pts goals: be able to move without pain    Objective     Baseline IM Testing Results:   Date of testin19  ROM 12-42 deg   Max Peak Torque  95   Min Peak Torque  40   Flex/Ext Ratio     % below normative data 39        Outcomes:  Initial score:  Visit 5 score:  Goal:      Treatment    Pt was instructed in and performed the following:     Katerina received therapeutic exercises to develop/improved posture, cardiovascular endurance, muscular endurance, lumbar/cervical ROM, strength and muscular endurance for 43 minutes including the following exercises:     Prone on elbows, 2x10  Open books, 10x each side        Peripheral muscle strengthening which included 1 set of 15-20 repetitions at a  slow, controlled 10-13 second per rep pace focused on strengthening supporting musculature for improved body mechanics and functional mobility.  Pt and therapist focused on proper form during treatment to ensure optimal strengthening of each targeted muscle group.  Machines were utilized including torso rotation, leg extension, leg curl, chest press, upright row. Tricep extension, bicep curl, leg press, and hip abduction added visit 3    Katerina received the following manual therapy techniques: Joint mobilizations were applied to the: PA glides T6-L2, grade III for 10 minutes.     [facility] 7/10/2019   Visit Number 2   VAS Pain Rating 0   Recumbent Bike Seat Pos. 11   Time 10   Extension in Lying 20   Extension in Standing -   Manual Therapy 10   Lumbar Extension Seat Pad -   Femur Restraint -   Top Dead Center -   Lumbar Flexion -   Lumbar Extension -   Lumbar Weight 65   Repetitions 15   Rating of Perceived Exertion 3           Home Exercises Provided and Patient Education Provided     Education provided:   - postural correction, purpose of mobilization    Written Home Exercises Provided: not today.  Exercises were reviewed and Jenn was able to demonstrate them prior to the end of the session.  Jenn demonstrated good  understanding of the education provided.     See EMR under NA for exercises provided NA.          Assessment   Jenn tolerated second session well with introduction of dynamic exercise and peripheral strengthening.     Patient is making good progress towards established goals.  Pt will continue to benefit from skilled outpatient physical therapy to address the deficits stated in the impairment chart, provide pt/family education and to maximize pt's level of independence in the home and community environment.     Anticipated Barriers for therapy: none  Pt's spiritual, cultural and educational needs considered and pt agreeable to plan of care and goals as stated below:       Goals:   Short term  goals:  3 weeks  1.  Pt will demonstrate increased lumbar ROM by at least 10 degrees from the initial ROM value with improvements noted in functional ROM and ability to perform ADLs.  2.  Pt will demonstrate increased maximum isometric torque value by 25% when compared to the initial value resulting in improved ability to perform bending, lifting, and carrying activities safely, confidently.     3.  Patient report a reduction in worst pain score by 1-2 points for improved tolerance for gardening.  4.  Pt able to perform HEP correctly with minimal cueing or supervision from therapist to encourage independent management of symptoms.         Long term goals: 6 weeks  1. Pt will demonstrate increased lumbar ROM by at least 15 degrees from initial ROM value, resulting in improved ability to perform functional fwd bending while standing and sitting.   2. Pt will demonstrate increased maximum isometric torque value by 50% when compared to the initial value resulting in improved ability to perform bending, lifting, and carrying activities safely, confidently.  3. Pt to demonstrate ability to independently control and reduce their pain through posture positioning and mechanical movements throughout a typical day.  4.  Pt will demonstrate reduced pain and improved functional outcomes as reported on the FOTO by reaching a score of CI = at least 1% but < 20% impaired, limited or restricted or less in order to demonstrate subjective improvement in pt's condition.    5. Pt will demonstrate independence with the HEP at discharge      Plan   Continue with established Plan of Care towards established PT goals.

## 2019-07-12 ENCOUNTER — CLINICAL SUPPORT (OUTPATIENT)
Dept: REHABILITATION | Facility: HOSPITAL | Age: 64
End: 2019-07-12
Payer: COMMERCIAL

## 2019-07-12 DIAGNOSIS — M54.50 CHRONIC BILATERAL LOW BACK PAIN WITHOUT SCIATICA: ICD-10-CM

## 2019-07-12 DIAGNOSIS — G89.29 CHRONIC BILATERAL LOW BACK PAIN WITHOUT SCIATICA: ICD-10-CM

## 2019-07-12 PROBLEM — M25.561 RIGHT KNEE PAIN: Status: RESOLVED | Noted: 2019-06-17 | Resolved: 2019-07-12

## 2019-07-12 PROBLEM — R53.1 WEAKNESS: Status: RESOLVED | Noted: 2019-06-17 | Resolved: 2019-07-12

## 2019-07-12 PROCEDURE — 97140 MANUAL THERAPY 1/> REGIONS: CPT | Mod: PO | Performed by: PHYSICAL THERAPIST

## 2019-07-12 PROCEDURE — 97110 THERAPEUTIC EXERCISES: CPT | Mod: PO | Performed by: PHYSICAL THERAPIST

## 2019-07-12 NOTE — PROGRESS NOTES
"[facility]      Name: Katerina Dickson  Clinic Number: [id number]    Therapy Diagnosis:   Encounter Diagnosis   Name Primary?    Chronic bilateral low back pain without sciatica      Physician: Nav Brower MD    Visit Date: 2019    Physician Orders: PT Eval and Treat OHB lumbar  Medical Diagnosis from Referral: M54.16,G89.29 (ICD-10-CM) - Chronic radicular lumbar pain  Evaluation Date: 2019  Authorization Period Expiration: 19  Plan of Care Expiration: 19  Reassessment Due: 19  Visit # / Visits authorized:     Time In: 145p  Time Out: 253p  Total Billable Time: 68 minutes    Precautions: Standard    Pattern of pain determined: PEP1    Subjective   Katerina reports her back felt fine after last session, however it is more sore today from doing "normal stuff". States she was on the ground for a long time grooming her dog, and did a lot of vacuuming.        Patient reports tolerating previous visit well in re: to low back.  Patient reports their pain to be 3/10 on a 0-10 scale with 0 being no pain and 10 being the worst pain imaginable.  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[employer]  Leisure: biking, swimming, gardening, reading   Pts goals: be able to move without pain    Objective     Baseline IM Testing Results:   Date of testin19  ROM 12-42 deg   Max Peak Torque  95   Min Peak Torque  40   Flex/Ext Ratio  3.5:1   % below normative data 39        Outcomes:  Initial score:  Visit 5 score:  Goal:      Treatment    Pt was instructed in and performed the following:     Katerina received therapeutic exercises to develop/improved posture, cardiovascular endurance, muscular endurance, lumbar/cervical ROM, strength and muscular endurance for 58 minutes including the following exercises:     LTR, 20x   Prone on elbows, 2x10  Open books, 10x each side        Peripheral muscle strengthening which " included 1 set of 15-20 repetitions at a slow, controlled 10-13 second per rep pace focused on strengthening supporting musculature for improved body mechanics and functional mobility.  Pt and therapist focused on proper form during treatment to ensure optimal strengthening of each targeted muscle group.  Machines were utilized including torso rotation, leg extension, leg curl, chest press, upright row. Tricep extension, bicep curl, leg press, and hip abduction.    Katerina received the following manual therapy techniques: Joint mobilizations were applied to the: PA glides T6-L2, grade III for 10 minutes.     [facility] 7/12/2019   Visit Number 3   VAS Pain Rating 3   Recumbent Bike Seat Pos. 11   Time 10   Extension in Lying 20   Extension in Standing -   Manual Therapy 10   Lumbar Extension Seat Pad -   Femur Restraint -   Top Dead Center -   Lumbar Flexion -   Lumbar Extension -   Lumbar Weight 68   Repetitions 15   Rating of Perceived Exertion 4           Home Exercises Provided and Patient Education Provided     Education provided:   - postural correction, purpose of mobilization    Written Home Exercises Provided: not today.  Exercises were reviewed and Jenn was able to demonstrate them prior to the end of the session.  Jenn demonstrated good  understanding of the education provided.     See EMR under NA for exercises provided NA.          Assessment   Jenn, despite having increased pain upon entering the clinic today, was able to progress through the entire peripheral strengthening program without pain or discomfort. Pt continues to have difficulty achieving her max extension position due to stiffness.      Patient is making good progress towards established goals.  Pt will continue to benefit from skilled outpatient physical therapy to address the deficits stated in the impairment chart, provide pt/family education and to maximize pt's level of independence in the home and community environment.      Anticipated Barriers for therapy: none  Pt's spiritual, cultural and educational needs considered and pt agreeable to plan of care and goals as stated below:       Goals:   Short term goals:  3 weeks  1.  Pt will demonstrate increased lumbar ROM by at least 10 degrees from the initial ROM value with improvements noted in functional ROM and ability to perform ADLs.  2.  Pt will demonstrate increased maximum isometric torque value by 25% when compared to the initial value resulting in improved ability to perform bending, lifting, and carrying activities safely, confidently.     3.  Patient report a reduction in worst pain score by 1-2 points for improved tolerance for gardening.  4.  Pt able to perform HEP correctly with minimal cueing or supervision from therapist to encourage independent management of symptoms.         Long term goals: 6 weeks  1. Pt will demonstrate increased lumbar ROM by at least 15 degrees from initial ROM value, resulting in improved ability to perform functional fwd bending while standing and sitting.   2. Pt will demonstrate increased maximum isometric torque value by 50% when compared to the initial value resulting in improved ability to perform bending, lifting, and carrying activities safely, confidently.  3. Pt to demonstrate ability to independently control and reduce their pain through posture positioning and mechanical movements throughout a typical day.  4.  Pt will demonstrate reduced pain and improved functional outcomes as reported on the FOTO by reaching a score of CI = at least 1% but < 20% impaired, limited or restricted or less in order to demonstrate subjective improvement in pt's condition.    5. Pt will demonstrate independence with the HEP at discharge      Plan   Continue with established Plan of Care towards established PT goals.

## 2019-07-16 ENCOUNTER — CLINICAL SUPPORT (OUTPATIENT)
Dept: REHABILITATION | Facility: HOSPITAL | Age: 64
End: 2019-07-16
Payer: COMMERCIAL

## 2019-07-16 DIAGNOSIS — G89.29 CHRONIC BILATERAL LOW BACK PAIN WITHOUT SCIATICA: ICD-10-CM

## 2019-07-16 DIAGNOSIS — M54.50 CHRONIC BILATERAL LOW BACK PAIN WITHOUT SCIATICA: ICD-10-CM

## 2019-07-16 PROCEDURE — 97140 MANUAL THERAPY 1/> REGIONS: CPT | Mod: PO | Performed by: PHYSICAL THERAPIST

## 2019-07-16 PROCEDURE — 97110 THERAPEUTIC EXERCISES: CPT | Mod: PO | Performed by: PHYSICAL THERAPIST

## 2019-07-16 NOTE — PROGRESS NOTES
[facility]      Name: Katerina Dickson  Clinic Number: [id number]    Therapy Diagnosis:   Encounter Diagnosis   Name Primary?    Chronic bilateral low back pain without sciatica      Physician: Nav Brower MD    Visit Date: 2019    Physician Orders: PT Eval and Treat OHB lumbar  Medical Diagnosis from Referral: M54.16,G89.29 (ICD-10-CM) - Chronic radicular lumbar pain  Evaluation Date: 2019  Authorization Period Expiration: 19  Plan of Care Expiration: 19  Reassessment Due: 19  Visit # / Visits authorized:     Time In: 1006 AM  Time Out: 1106 AM  Total Billable Time: 60 minutes    Precautions: Standard    Pattern of pain determined: PEP1    Subjective   Katerina reports she is doing better today then yesterday. I sat at the computer for a while doing my work and this irritated the back.     Patient reports tolerating previous visit well in re: to low back.  Patient reports their pain to be 2/10 on a 0-10 scale with 0 being no pain and 10 being the worst pain imaginable.  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[employer]  Leisure: biking, swimming, gardening, reading   Pts goals: be able to move without pain    Objective     Baseline IM Testing Results:   Date of testin19  ROM 12-42 deg   Max Peak Torque  95   Min Peak Torque  40   Flex/Ext Ratio  3.5:1   % below normative data 39        Outcomes:  Initial score:  Visit 5 score:  Goal:      Treatment    Pt was instructed in and performed the following:     Katerina received therapeutic exercises to develop/improved posture, cardiovascular endurance, muscular endurance, lumbar/cervical ROM, strength and muscular endurance for 58 minutes including the following exercises:     LTR, 20x   Prone on elbows, 2x10  Open books, 10x each side    Peripheral muscle strengthening which included 1 set of 15-20 repetitions at a slow, controlled 10-13 second  per rep pace focused on strengthening supporting musculature for improved body mechanics and functional mobility.  Pt and therapist focused on proper form during treatment to ensure optimal strengthening of each targeted muscle group.  Machines were utilized including torso rotation, leg extension, leg curl, chest press, upright row. Tricep extension, bicep curl, leg press, and hip add/abduction.    Katerina received the following manual therapy techniques: Joint mobilizations were applied to the: PA glides T6-L2, grade III for 10 minutes.     [facility] 7/16/2019   Visit Number 4   VAS Pain Rating 2   Recumbent Bike Seat Pos. 11   Time 10   Extension in Lying 20   Extension in Standing -   Manual Therapy 10   Lumbar Extension Seat Pad -   Femur Restraint -   Top Dead Center -   Lumbar Flexion -   Lumbar Extension -   Lumbar Weight 68   Repetitions 15   Rating of Perceived Exertion 4       Home Exercises Provided and Patient Education Provided     Education provided:   - postural correction, purpose of mobilization    Written Home Exercises Provided: not today.  Exercises were reviewed and Jenn was able to demonstrate them prior to the end of the session.  Jenn demonstrated good  understanding of the education provided.     See EMR under NA for exercises provided NA.      Assessment   Jenn was able to perform an increased number of reps on MedX extension today and go thru entire ROM. She will be ready for progression of resistance next session. She is doing well with all peripheral strengthening.     Patient is making good progress towards established goals.  Pt will continue to benefit from skilled outpatient physical therapy to address the deficits stated in the impairment chart, provide pt/family education and to maximize pt's level of independence in the home and community environment.     Anticipated Barriers for therapy: none  Pt's spiritual, cultural and educational needs considered and pt agreeable to  plan of care and goals as stated below:       Goals:   Short term goals:  3 weeks  1.  Pt will demonstrate increased lumbar ROM by at least 10 degrees from the initial ROM value with improvements noted in functional ROM and ability to perform ADLs.  2.  Pt will demonstrate increased maximum isometric torque value by 25% when compared to the initial value resulting in improved ability to perform bending, lifting, and carrying activities safely, confidently.     3.  Patient report a reduction in worst pain score by 1-2 points for improved tolerance for gardening.  4.  Pt able to perform HEP correctly with minimal cueing or supervision from therapist to encourage independent management of symptoms.         Long term goals: 6 weeks  1. Pt will demonstrate increased lumbar ROM by at least 15 degrees from initial ROM value, resulting in improved ability to perform functional fwd bending while standing and sitting.   2. Pt will demonstrate increased maximum isometric torque value by 50% when compared to the initial value resulting in improved ability to perform bending, lifting, and carrying activities safely, confidently.  3. Pt to demonstrate ability to independently control and reduce their pain through posture positioning and mechanical movements throughout a typical day.  4.  Pt will demonstrate reduced pain and improved functional outcomes as reported on the FOTO by reaching a score of CI = at least 1% but < 20% impaired, limited or restricted or less in order to demonstrate subjective improvement in pt's condition.    5. Pt will demonstrate independence with the HEP at discharge      Plan   Continue with established Plan of Care towards established PT goals.

## 2019-07-18 ENCOUNTER — CLINICAL SUPPORT (OUTPATIENT)
Dept: REHABILITATION | Facility: HOSPITAL | Age: 64
End: 2019-07-18
Payer: COMMERCIAL

## 2019-07-18 DIAGNOSIS — G89.29 CHRONIC BILATERAL LOW BACK PAIN WITHOUT SCIATICA: ICD-10-CM

## 2019-07-18 DIAGNOSIS — M54.50 CHRONIC BILATERAL LOW BACK PAIN WITHOUT SCIATICA: ICD-10-CM

## 2019-07-18 PROCEDURE — 97140 MANUAL THERAPY 1/> REGIONS: CPT | Mod: PO | Performed by: PHYSICAL THERAPIST

## 2019-07-18 PROCEDURE — 97110 THERAPEUTIC EXERCISES: CPT | Mod: PO | Performed by: PHYSICAL THERAPIST

## 2019-07-18 NOTE — PROGRESS NOTES
[facility]      Name: Katerina Dickson  Clinic Number: [id number]    Therapy Diagnosis:   Encounter Diagnosis   Name Primary?    Chronic bilateral low back pain without sciatica      Physician: Nav Brower MD    Visit Date: 2019    Physician Orders: PT Eval and Treat OHB lumbar  Medical Diagnosis from Referral: M54.16,G89.29 (ICD-10-CM) - Chronic radicular lumbar pain  Evaluation Date: 2019  Authorization Period Expiration: 19  Plan of Care Expiration: 19  Reassessment Due: 19  Visit # / Visits authorized:     Time In: 201 PM  Time Out: 303 PM  Total Billable Time: 62 minutes    Precautions: Standard    Pattern of pain determined: PEP1    Subjective   Katerina reports she is feeling much better overall. My knee is 100% better and my back is getting there.    Patient reports tolerating previous visit well in re: to low back.  Patient reports their pain to be 0/10 on a 0-10 scale with 0 being no pain and 10 being the worst pain imaginable.  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[employer]  Leisure: biking, swimming, gardening, reading   Pts goals: be able to move without pain    Objective     Baseline IM Testing Results:   Date of testin19  ROM 12-42 deg   Max Peak Torque  95   Min Peak Torque  40   Flex/Ext Ratio  3.5:1   % below normative data 39        Outcomes:  Initial score: 38% limited  Visit 5 score: 28% limited   Goal:      Treatment    Pt was instructed in and performed the following:     Katerina received therapeutic exercises to develop/improved posture, cardiovascular endurance, muscular endurance, lumbar/cervical ROM, strength and muscular endurance for 58 minutes including the following exercises:     LTR, 20x   Prone on elbows, 2x10  Open books, 10x each side    Peripheral muscle strengthening which included 1 set of 15-20 repetitions at a slow, controlled 10-13 second per rep  pace focused on strengthening supporting musculature for improved body mechanics and functional mobility.  Pt and therapist focused on proper form during treatment to ensure optimal strengthening of each targeted muscle group.  Machines were utilized including torso rotation, leg extension, leg curl, chest press, upright row. Tricep extension, bicep curl, leg press, and hip add/abduction.    Katerina received the following manual therapy techniques: Joint mobilizations were applied to the: PA glides T6-L2, grade III for 10 minutes.     [facility] 7/18/2019   Visit Number 45   VAS Pain Rating 0   Recumbent Bike Seat Pos. 11   Time 10   Extension in Lying 20   Extension in Standing -   Manual Therapy 10   Lumbar Extension Seat Pad -   Femur Restraint -   Top Dead Center -   Lumbar Flexion -   Lumbar Extension -   Lumbar Weight 72   Repetitions 16   Rating of Perceived Exertion 5       Home Exercises Provided and Patient Education Provided     Education provided:   - postural correction, purpose of mobilization    Written Home Exercises Provided: not today.  Exercises were reviewed and Jenn was able to demonstrate them prior to the end of the session.  Jenn demonstrated good  understanding of the education provided.     See EMR under NA for exercises provided NA.      Assessment   Jenn was able to increase resistance on lumbar extension exercise with a RPE of hard. Overall, her symptoms have been reduced. She appears to be ready for an increase in resistance on several of her peripheral strengthening activities next session    Patient is making good progress towards established goals.  Pt will continue to benefit from skilled outpatient physical therapy to address the deficits stated in the impairment chart, provide pt/family education and to maximize pt's level of independence in the home and community environment.     Anticipated Barriers for therapy: none  Pt's spiritual, cultural and educational needs  considered and pt agreeable to plan of care and goals as stated below:       Goals:   Short term goals:  3 weeks  1.  Pt will demonstrate increased lumbar ROM by at least 10 degrees from the initial ROM value with improvements noted in functional ROM and ability to perform ADLs.  2.  Pt will demonstrate increased maximum isometric torque value by 25% when compared to the initial value resulting in improved ability to perform bending, lifting, and carrying activities safely, confidently.     3.  Patient report a reduction in worst pain score by 1-2 points for improved tolerance for gardening.  4.  Pt able to perform HEP correctly with minimal cueing or supervision from therapist to encourage independent management of symptoms.         Long term goals: 6 weeks  1. Pt will demonstrate increased lumbar ROM by at least 15 degrees from initial ROM value, resulting in improved ability to perform functional fwd bending while standing and sitting.   2. Pt will demonstrate increased maximum isometric torque value by 50% when compared to the initial value resulting in improved ability to perform bending, lifting, and carrying activities safely, confidently.  3. Pt to demonstrate ability to independently control and reduce their pain through posture positioning and mechanical movements throughout a typical day.  4.  Pt will demonstrate reduced pain and improved functional outcomes as reported on the FOTO by reaching a score of CI = at least 1% but < 20% impaired, limited or restricted or less in order to demonstrate subjective improvement in pt's condition.    5. Pt will demonstrate independence with the HEP at discharge      Plan   Continue with established Plan of Care towards established PT goals.

## 2019-07-22 ENCOUNTER — CLINICAL SUPPORT (OUTPATIENT)
Dept: REHABILITATION | Facility: HOSPITAL | Age: 64
End: 2019-07-22
Payer: COMMERCIAL

## 2019-07-22 DIAGNOSIS — G89.29 CHRONIC BILATERAL LOW BACK PAIN WITHOUT SCIATICA: ICD-10-CM

## 2019-07-22 DIAGNOSIS — M54.50 CHRONIC BILATERAL LOW BACK PAIN WITHOUT SCIATICA: ICD-10-CM

## 2019-07-22 PROCEDURE — 97140 MANUAL THERAPY 1/> REGIONS: CPT | Mod: PO | Performed by: PHYSICAL THERAPIST

## 2019-07-22 PROCEDURE — 97110 THERAPEUTIC EXERCISES: CPT | Mod: PO | Performed by: PHYSICAL THERAPIST

## 2019-07-22 NOTE — PROGRESS NOTES
[facility]      Name: Katerina Dickson  Clinic Number: [id number]    Therapy Diagnosis:   Encounter Diagnosis   Name Primary?    Chronic bilateral low back pain without sciatica      Physician: Nav Brower MD    Visit Date: 2019    Physician Orders: PT Eval and Treat OHB lumbar  Medical Diagnosis from Referral: M54.16,G89.29 (ICD-10-CM) - Chronic radicular lumbar pain  Evaluation Date: 2019  Authorization Period Expiration: 19  Plan of Care Expiration: 19  Reassessment Due: 19  Visit # / Visits authorized:     Time In: 204 PM  Time Out: 302 PM  Total Billable Time: 58 minutes    Precautions: Standard    Pattern of pain determined: PEP1    Subjective   Katerina reports she has not had any pain in her back. Paying more attention to posture and body mechanics, and states when she does get pain when sitting in her chair for work it is relieved with use of the lumbar roll.     Patient reports tolerating previous visit well in re: to low back.  Patient reports their pain to be 0/10 on a 0-10 scale with 0 being no pain and 10 being the worst pain imaginable.  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[employer]  Leisure: biking, swimming, gardening, reading   Pts goals: be able to move without pain    Objective     Baseline IM Testing Results:   Date of testin19  ROM 12-42 deg   Max Peak Torque  95   Min Peak Torque  40   Flex/Ext Ratio  3.5:1   % below normative data 39        Outcomes:  Initial score: 38% limited  Visit 5 score: 28% limited   Goal:      Treatment    Pt was instructed in and performed the following:     Katerina received therapeutic exercises to develop/improved posture, cardiovascular endurance, muscular endurance, lumbar/cervical ROM, strength and muscular endurance for 50 minutes including the following exercises:     LTR, 30x  Prone on elbows, 2x10--NP  Open books, 10x each  side--NP    Peripheral muscle strengthening which included 1 set of 15-20 repetitions at a slow, controlled 10-13 second per rep pace focused on strengthening supporting musculature for improved body mechanics and functional mobility.  Pt and therapist focused on proper form during treatment to ensure optimal strengthening of each targeted muscle group.  Machines were utilized including torso rotation, leg extension, leg curl, chest press, upright row. Tricep extension, bicep curl, leg press, and hip add/abduction.    Katerina received the following manual therapy techniques: Joint mobilizations were applied to the: PA glides T6-L2, grade III for 8 minutes.     [facility] 7/22/2019   Visit Number 6   VAS Pain Rating 0   Recumbent Bike Seat Pos. 13   Time 10   Extension in Lying -   Extension in Standing -   Manual Therapy 8   Lumbar Extension Seat Pad 0   Femur Restraint 5   Top Dead Center 24   Counterweight 363   Lumbar Flexion 45   Lumbar Extension 6   Lumbar Peak Torque 121   Min Torque 48   Test Percent Gain in Strength from Initial  43   Lumbar Weight -   Repetitions -   Rating of Perceived Exertion -           Home Exercises Provided and Patient Education Provided     Education provided:   - postural correction, purpose of mobilization    Written Home Exercises Provided: not today.  Exercises were reviewed and Jenn was able to demonstrate them prior to the end of the session.  Jenn demonstrated good  understanding of the education provided.     See EMR under NA for exercises provided NA.      Assessment   Jenn showed good improvement in lumbar strength (43% improvement as compared to initial eval), as well as improved ROM by 6* in extension and 3* in flexion. Pt is progressing well with the program.    Patient is making good progress towards established goals.  Pt will continue to benefit from skilled outpatient physical therapy to address the deficits stated in the impairment chart, provide pt/family  education and to maximize pt's level of independence in the home and community environment.     Anticipated Barriers for therapy: none  Pt's spiritual, cultural and educational needs considered and pt agreeable to plan of care and goals as stated below:       Goals:   Short term goals:  3 weeks  1.  Pt will demonstrate increased lumbar ROM by at least 10 degrees from the initial ROM value with improvements noted in functional ROM and ability to perform ADLs. ongoing  2.  Pt will demonstrate increased maximum isometric torque value by 25% when compared to the initial value resulting in improved ability to perform bending, lifting, and carrying activities safely, confidently. MET 7/22/19  3.  Patient report a reduction in worst pain score by 1-2 points for improved tolerance for gardening. ongoing  4.  Pt able to perform HEP correctly with minimal cueing or supervision from therapist to encourage independent management of symptoms. MET        Long term goals: 6 weeks  1. Pt will demonstrate increased lumbar ROM by at least 15 degrees from initial ROM value, resulting in improved ability to perform functional fwd bending while standing and sitting.  Ongoing  2. Pt will demonstrate increased maximum isometric torque value by 50% when compared to the initial value resulting in improved ability to perform bending, lifting, and carrying activities safely, confidently. Ongoing  3. Pt to demonstrate ability to independently control and reduce their pain through posture positioning and mechanical movements throughout a typical day. MET 7/22/19  4.  Pt will demonstrate reduced pain and improved functional outcomes as reported on the FOTO by reaching a score of CI = at least 1% but < 20% impaired, limited or restricted or less in order to demonstrate subjective improvement in pt's condition.  Ongoing  5. Pt will demonstrate independence with the HEP at discharge. Ongoing      Plan   Continue with established Plan of Care  towards established PT goals.

## 2019-07-24 ENCOUNTER — CLINICAL SUPPORT (OUTPATIENT)
Dept: REHABILITATION | Facility: HOSPITAL | Age: 64
End: 2019-07-24
Payer: COMMERCIAL

## 2019-07-24 DIAGNOSIS — G89.29 CHRONIC BILATERAL LOW BACK PAIN WITHOUT SCIATICA: ICD-10-CM

## 2019-07-24 DIAGNOSIS — M54.50 CHRONIC BILATERAL LOW BACK PAIN WITHOUT SCIATICA: ICD-10-CM

## 2019-07-24 PROCEDURE — 97140 MANUAL THERAPY 1/> REGIONS: CPT | Mod: PO | Performed by: PHYSICAL THERAPIST

## 2019-07-24 PROCEDURE — 97110 THERAPEUTIC EXERCISES: CPT | Mod: PO | Performed by: PHYSICAL THERAPIST

## 2019-07-24 NOTE — PROGRESS NOTES
[facility]      Name: Katerina Dickson  Clinic Number: [id number]    Therapy Diagnosis:   Encounter Diagnosis   Name Primary?    Chronic bilateral low back pain without sciatica      Physician: Nav Brower MD    Visit Date: 2019    Physician Orders: PT Eval and Treat OHB lumbar  Medical Diagnosis from Referral: M54.16,G89.29 (ICD-10-CM) - Chronic radicular lumbar pain  Evaluation Date: 2019  Authorization Period Expiration: 19  Plan of Care Expiration: 19  Reassessment Due: 19  Visit # / Visits authorized:     Time In: 1106a  Time Out: 1210p  Total Billable Time: 55 minutes    Precautions: Standard    Pattern of pain determined: PEP1    Subjective   Katerina reports her back felt fine yesterday, but woke up this morning with increased thoracic spine pain. States she must have slept weird.     Patient reports tolerating previous visit well in re: to low back.  Patient reports their pain to be 4/10 on a 0-10 scale with 0 being no pain and 10 being the worst pain imaginable.  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[employer]  Leisure: biking, swimming, gardening, reading   Pts goals: be able to move without pain    Objective     Baseline IM Testing Results:   Date of testin19  ROM 12-42 deg   Max Peak Torque  95   Min Peak Torque  40   Flex/Ext Ratio  3.5:1   % below normative data 39        Outcomes:  Initial score: 38% limited  Visit 5 score: 28% limited   Goal:      Treatment    Pt was instructed in and performed the following:     Katerina received therapeutic exercises to develop/improved posture, cardiovascular endurance, muscular endurance, lumbar/cervical ROM, strength and muscular endurance for 50 minutes including the following exercises:     Prone on elbows, 15x  Prone on elbows sustained, 1 min  Thoracic ext over chair, 2x10      Peripheral muscle strengthening which included 1 set of  15-20 repetitions at a slow, controlled 10-13 second per rep pace focused on strengthening supporting musculature for improved body mechanics and functional mobility.  Pt and therapist focused on proper form during treatment to ensure optimal strengthening of each targeted muscle group.  Machines were utilized including torso rotation, leg extension, leg curl, chest press, upright row. Tricep extension, bicep curl, leg press, and hip add/abduction.    Katerina received the following manual therapy techniques: Joint mobilizations were applied to the: PA glides T6-L2 grade III and STM to L T/L paraspinals for 10 minutes.     [facility] 7/26/2019   Visit Number 7   VAS Pain Rating 4   Recumbent Bike Seat Pos. -   Time -   Extension in Lying 20   Extension in Standing -   Manual Therapy 10   Lumbar Extension Seat Pad -   Femur Restraint -   Top Dead Center -   Counterweight -   Lumbar Flexion -   Lumbar Extension -   Lumbar Peak Torque -   Min Torque -   Test Percent Gain in Strength from Initial  -   Lumbar Weight 70   Repetitions 15   Rating of Perceived Exertion 4   Ice - Z Lie (in min.) 10           Home Exercises Provided and Patient Education Provided     Education provided:   - postural correction, purpose of mobilization    Written Home Exercises Provided: not today.  Exercises were reviewed and Jenn was able to demonstrate them prior to the end of the session.  Jenn demonstrated good  understanding of the education provided.     See EMR under NA for exercises provided NA.      Assessment   Jenn tolerated session well, and demonstrated less guarding following treatment. Will assess response and progress as tolerated next session.     Patient is making good progress towards established goals.  Pt will continue to benefit from skilled outpatient physical therapy to address the deficits stated in the impairment chart, provide pt/family education and to maximize pt's level of independence in the home and  community environment.     Anticipated Barriers for therapy: none  Pt's spiritual, cultural and educational needs considered and pt agreeable to plan of care and goals as stated below:       Goals:   Short term goals:  3 weeks  1.  Pt will demonstrate increased lumbar ROM by at least 10 degrees from the initial ROM value with improvements noted in functional ROM and ability to perform ADLs. ongoing  2.  Pt will demonstrate increased maximum isometric torque value by 25% when compared to the initial value resulting in improved ability to perform bending, lifting, and carrying activities safely, confidently. MET 7/22/19  3.  Patient report a reduction in worst pain score by 1-2 points for improved tolerance for gardening. ongoing  4.  Pt able to perform HEP correctly with minimal cueing or supervision from therapist to encourage independent management of symptoms. MET        Long term goals: 6 weeks  1. Pt will demonstrate increased lumbar ROM by at least 15 degrees from initial ROM value, resulting in improved ability to perform functional fwd bending while standing and sitting.  Ongoing  2. Pt will demonstrate increased maximum isometric torque value by 50% when compared to the initial value resulting in improved ability to perform bending, lifting, and carrying activities safely, confidently. Ongoing  3. Pt to demonstrate ability to independently control and reduce their pain through posture positioning and mechanical movements throughout a typical day. MET 7/22/19  4.  Pt will demonstrate reduced pain and improved functional outcomes as reported on the FOTO by reaching a score of CI = at least 1% but < 20% impaired, limited or restricted or less in order to demonstrate subjective improvement in pt's condition.  Ongoing  5. Pt will demonstrate independence with the HEP at discharge. Ongoing      Plan   Continue with established Plan of Care towards established PT goals.

## 2019-08-05 ENCOUNTER — CLINICAL SUPPORT (OUTPATIENT)
Dept: REHABILITATION | Facility: HOSPITAL | Age: 64
End: 2019-08-05
Attending: ORTHOPAEDIC SURGERY
Payer: COMMERCIAL

## 2019-08-05 DIAGNOSIS — G89.29 CHRONIC BILATERAL LOW BACK PAIN WITHOUT SCIATICA: ICD-10-CM

## 2019-08-05 DIAGNOSIS — M54.50 CHRONIC BILATERAL LOW BACK PAIN WITHOUT SCIATICA: ICD-10-CM

## 2019-08-05 PROCEDURE — 97140 MANUAL THERAPY 1/> REGIONS: CPT | Mod: PO | Performed by: PHYSICAL THERAPIST

## 2019-08-05 PROCEDURE — 97110 THERAPEUTIC EXERCISES: CPT | Mod: PO | Performed by: PHYSICAL THERAPIST

## 2019-08-05 NOTE — PROGRESS NOTES
[facility]      Name: Katerina Dickson  Clinic Number: [id number]    Therapy Diagnosis:   Encounter Diagnosis   Name Primary?    Chronic bilateral low back pain without sciatica      Physician: Nav Brower MD    Visit Date: 2019    Physician Orders: PT Eval and Treat OHB lumbar  Medical Diagnosis from Referral: M54.16,G89.29 (ICD-10-CM) - Chronic radicular lumbar pain  Evaluation Date: 2019  Authorization Period Expiration: 19  Plan of Care Expiration: 19  Reassessment Due: 19  Visit # / Visits authorized:     Time In: 4:20pm  Time Out: 5:10pm  Total Billable Time: 50 minutes    Precautions: Standard    Pattern of pain determined: PEP1    Subjective   Katerina reports no complaints of back pain today.    Patient reports tolerating previous visit well in re: to low back.  Patient reports their pain to be 0/10 on a 0-10 scale with 0 being no pain and 10 being the worst pain imaginable.  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[employer]  Leisure: biking, swimming, gardening, reading   Pts goals: be able to move without pain    Objective     Baseline IM Testing Results:   Date of testin19  ROM 12-42 deg   Max Peak Torque  95   Min Peak Torque  40   Flex/Ext Ratio  3.5:1   % below normative data 39        Outcomes:  Initial score: 38% limited  Visit 5 score: 28% limited   Goal:      Treatment    Pt was instructed in and performed the following:     Katerina received therapeutic exercises to develop/improved posture, cardiovascular endurance, muscular endurance, lumbar/cervical ROM, strength and muscular endurance for 40 minutes including the following exercises:     Prone on elbows, 15x  Prone on elbows sustained, 1 min  Thoracic ext over chair, 2x10      Peripheral muscle strengthening which included 1 set of 15-20 repetitions at a slow, controlled 10-13 second per rep pace focused on strengthening  supporting musculature for improved body mechanics and functional mobility.  Pt and therapist focused on proper form during treatment to ensure optimal strengthening of each targeted muscle group.  Machines were utilized including torso rotation, leg extension, leg curl, chest press, upright row. Tricep extension, bicep curl, leg press, and hip add/abduction.    Katerina received the following manual therapy techniques: Joint mobilizations were applied to the: PA glides T6-L2 grade III  for 10 minutes.     [facility] 8/5/2019   Visit Number 8   VAS Pain Rating 0   Recumbent Bike Seat Pos. 11   Time 10   Extension in Lying 20   Extension in Standing -   Manual Therapy 10   Lumbar Extension Seat Pad -   Femur Restraint -   Top Dead Center -   Counterweight -   Lumbar Flexion 45   Lumbar Extension 12   Lumbar Peak Torque -   Min Torque -   Test Percent Gain in Strength from Initial  -   Lumbar Weight 70   Repetitions 15   Rating of Perceived Exertion 4   Ice - Z Lie (in min.) -           Home Exercises Provided and Patient Education Provided     Education provided:   - postural correction, purpose of mobilization    Written Home Exercises Provided: not today.  Exercises were reviewed and Jenn was able to demonstrate them prior to the end of the session.  Jenn demonstrated good  understanding of the education provided.     See EMR under NA for exercises provided NA.      Assessment     Pt having no pain today. Very stiff and limited in extension of lumbar and thoracic spine. Limited ROM on MEDX. Attempted to increase ROM parameters, but pt could not actively extend further. Improved rotation on torso rotation machine. Improved extension with manual mob/AP glides.    Patient is making good progress towards established goals.  Pt will continue to benefit from skilled outpatient physical therapy to address the deficits stated in the impairment chart, provide pt/family education and to maximize pt's level of  independence in the home and community environment.     Anticipated Barriers for therapy: none  Pt's spiritual, cultural and educational needs considered and pt agreeable to plan of care and goals as stated below:       Goals:   Short term goals:  3 weeks  1.  Pt will demonstrate increased lumbar ROM by at least 10 degrees from the initial ROM value with improvements noted in functional ROM and ability to perform ADLs. ongoing  2.  Pt will demonstrate increased maximum isometric torque value by 25% when compared to the initial value resulting in improved ability to perform bending, lifting, and carrying activities safely, confidently. MET 7/22/19  3.  Patient report a reduction in worst pain score by 1-2 points for improved tolerance for gardening. ongoing  4.  Pt able to perform HEP correctly with minimal cueing or supervision from therapist to encourage independent management of symptoms. MET        Long term goals: 6 weeks  1. Pt will demonstrate increased lumbar ROM by at least 15 degrees from initial ROM value, resulting in improved ability to perform functional fwd bending while standing and sitting.  Ongoing  2. Pt will demonstrate increased maximum isometric torque value by 50% when compared to the initial value resulting in improved ability to perform bending, lifting, and carrying activities safely, confidently. Ongoing  3. Pt to demonstrate ability to independently control and reduce their pain through posture positioning and mechanical movements throughout a typical day. MET 7/22/19  4.  Pt will demonstrate reduced pain and improved functional outcomes as reported on the FOTO by reaching a score of CI = at least 1% but < 20% impaired, limited or restricted or less in order to demonstrate subjective improvement in pt's condition.  Ongoing  5. Pt will demonstrate independence with the HEP at discharge. Ongoing      Plan   Continue with established Plan of Care towards established PT goals.

## 2019-08-09 ENCOUNTER — CLINICAL SUPPORT (OUTPATIENT)
Dept: REHABILITATION | Facility: HOSPITAL | Age: 64
End: 2019-08-09
Attending: ORTHOPAEDIC SURGERY
Payer: COMMERCIAL

## 2019-08-09 DIAGNOSIS — M54.50 CHRONIC BILATERAL LOW BACK PAIN WITHOUT SCIATICA: ICD-10-CM

## 2019-08-09 DIAGNOSIS — G89.29 CHRONIC BILATERAL LOW BACK PAIN WITHOUT SCIATICA: ICD-10-CM

## 2019-08-09 PROCEDURE — 97110 THERAPEUTIC EXERCISES: CPT | Mod: PO | Performed by: PHYSICAL THERAPIST

## 2019-08-09 PROCEDURE — 97140 MANUAL THERAPY 1/> REGIONS: CPT | Mod: PO | Performed by: PHYSICAL THERAPIST

## 2019-08-09 NOTE — PROGRESS NOTES
[facility]      Name: Katerina Dcikson  Clinic Number: [id number]    Therapy Diagnosis:   Encounter Diagnosis   Name Primary?    Chronic bilateral low back pain without sciatica      Physician: Nav Brower MD    Visit Date: 2019    Physician Orders: PT Eval and Treat OHB lumbar  Medical Diagnosis from Referral: M54.16,G89.29 (ICD-10-CM) - Chronic radicular lumbar pain  Evaluation Date: 2019  Authorization Period Expiration: 19  Plan of Care Expiration: 19  Reassessment Due: 19  Visit # / Visits authorized:     Time In: 309 pm  Time Out: 400 pm  Total Billable Time: 51 minutes    Precautions: Standard    Pattern of pain determined: PEP1    Subjective   Katerina reports no complaints of back pain today.    Patient reports tolerating previous visit well in re: to low back.  Patient reports their pain to be 0/10 on a 0-10 scale with 0 being no pain and 10 being the worst pain imaginable.  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[employer]  Leisure: biking, swimming, gardening, reading   Pts goals: be able to move without pain    Objective     Baseline IM Testing Results:   Date of testin19  ROM 12-42 deg   Max Peak Torque  95   Min Peak Torque  40   Flex/Ext Ratio  3.5:1   % below normative data 39        Outcomes:  Initial score: 38% limited  Visit 5 score: 28% limited   Goal:      Treatment    Pt was instructed in and performed the following:     Katerina received therapeutic exercises to develop/improved posture, cardiovascular endurance, muscular endurance, lumbar/cervical ROM, strength and muscular endurance for 40 minutes including the following exercises:     Prone on elbows, 15x  Prone on elbows sustained, 1 min  PPT, 20x5s    [facility] 2019   Visit Number 9   VAS Pain Rating 0   Recumbent Bike Seat Pos. 11   Time 10   Extension in Lying -   Extension in Standing -   Manual  Therapy 8   Lumbar Extension Seat Pad -   Femur Restraint -   Top Dead Center -   Counterweight -   Lumbar Flexion 45   Lumbar Extension 15   Lumbar Peak Torque -   Min Torque -   Test Percent Gain in Strength from Initial  -   Lumbar Weight 75   Repetitions 15   Rating of Perceived Exertion 3   Ice - Z Lie (in min.) -       Peripheral muscle strengthening which included 1 set of 15-20 repetitions at a slow, controlled 10-13 second per rep pace focused on strengthening supporting musculature for improved body mechanics and functional mobility.  Pt and therapist focused on proper form during treatment to ensure optimal strengthening of each targeted muscle group.  Machines were utilized including torso rotation, leg extension, leg curl, chest press, upright row. Tricep extension, bicep curl, leg press, and hip add/abduction.    Katerina received the following manual therapy techniques: Joint mobilizations were applied to the: PA glides T6-L2 grade III  for 8 minutes.         Home Exercises Provided and Patient Education Provided     Education provided:   - postural correction, purpose of mobilization    Written Home Exercises Provided: not today.  Exercises were reviewed and Jenn was able to demonstrate them prior to the end of the session.  Jenn demonstrated good  understanding of the education provided.     See EMR under NA for exercises provided NA.      Assessment     Due to significant increase in pain with attempting to gain increased lumbar extension on med ex machine, today we regressed to 15* ext, which pt tolerated well and was able to increase resistance with less pain.     Patient is making good progress towards established goals.  Pt will continue to benefit from skilled outpatient physical therapy to address the deficits stated in the impairment chart, provide pt/family education and to maximize pt's level of independence in the home and community environment.     Anticipated Barriers for therapy:  none  Pt's spiritual, cultural and educational needs considered and pt agreeable to plan of care and goals as stated below:       Goals:   Short term goals:  3 weeks  1.  Pt will demonstrate increased lumbar ROM by at least 10 degrees from the initial ROM value with improvements noted in functional ROM and ability to perform ADLs. ongoing  2.  Pt will demonstrate increased maximum isometric torque value by 25% when compared to the initial value resulting in improved ability to perform bending, lifting, and carrying activities safely, confidently. MET 7/22/19  3.  Patient report a reduction in worst pain score by 1-2 points for improved tolerance for gardening. ongoing  4.  Pt able to perform HEP correctly with minimal cueing or supervision from therapist to encourage independent management of symptoms. MET        Long term goals: 6 weeks  1. Pt will demonstrate increased lumbar ROM by at least 15 degrees from initial ROM value, resulting in improved ability to perform functional fwd bending while standing and sitting.  Ongoing  2. Pt will demonstrate increased maximum isometric torque value by 50% when compared to the initial value resulting in improved ability to perform bending, lifting, and carrying activities safely, confidently. Ongoing  3. Pt to demonstrate ability to independently control and reduce their pain through posture positioning and mechanical movements throughout a typical day. MET 7/22/19  4.  Pt will demonstrate reduced pain and improved functional outcomes as reported on the FOTO by reaching a score of CI = at least 1% but < 20% impaired, limited or restricted or less in order to demonstrate subjective improvement in pt's condition.  Ongoing  5. Pt will demonstrate independence with the HEP at discharge. Ongoing      Plan   Continue with established Plan of Care towards established PT goals.

## 2019-08-12 ENCOUNTER — CLINICAL SUPPORT (OUTPATIENT)
Dept: REHABILITATION | Facility: HOSPITAL | Age: 64
End: 2019-08-12
Attending: ORTHOPAEDIC SURGERY
Payer: COMMERCIAL

## 2019-08-12 DIAGNOSIS — G89.29 CHRONIC BILATERAL LOW BACK PAIN WITHOUT SCIATICA: ICD-10-CM

## 2019-08-12 DIAGNOSIS — M54.50 CHRONIC BILATERAL LOW BACK PAIN WITHOUT SCIATICA: ICD-10-CM

## 2019-08-12 PROCEDURE — 97110 THERAPEUTIC EXERCISES: CPT | Mod: PO | Performed by: PHYSICAL THERAPIST

## 2019-08-12 NOTE — PROGRESS NOTES
[facility]      Name: Katerina Dickson  Clinic Number: [id number]    Therapy Diagnosis:   Encounter Diagnosis   Name Primary?    Chronic bilateral low back pain without sciatica      Physician: Nav Brower MD    Visit Date: 2019    Physician Orders: PT Eval and Treat OHB lumbar  Medical Diagnosis from Referral: M54.16,G89.29 (ICD-10-CM) - Chronic radicular lumbar pain  Evaluation Date: 2019  Authorization Period Expiration: 19  Plan of Care Expiration: 19  Reassessment Due: 19  Visit # / Visits authorized: 10/30    Time In: 154 pm  Time Out: 250 pm  Total Billable Time: 56 minutes    Precautions: Standard    Pattern of pain determined: PEP1    Subjective   Katerina reports her back is feeling pretty good today. Did a lot of work in and out of the house over the weekend. States working outside was fine, but the cleaning inside gave her some problems. She was able to manage her pain with her stretches.     Patient reports tolerating previous visit well in re: to low back.  Patient reports their pain to be 0/10 on a 0-10 scale with 0 being no pain and 10 being the worst pain imaginable.  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[employer]  Leisure: biking, swimming, gardening, reading   Pts goals: be able to move without pain    Objective     Baseline IM Testing Results:   Date of testin19  ROM 12-42 deg   Max Peak Torque  95   Min Peak Torque  40   Flex/Ext Ratio  3.5:1   % below normative data 39        Outcomes:  Initial score: 38% limited  Visit 5 score: 28% limited   Goal:      Treatment    Pt was instructed in and performed the following:     Katerina received therapeutic exercises to develop/improved posture, cardiovascular endurance, muscular endurance, lumbar/cervical ROM, strength and muscular endurance for 48 minutes including the following exercises:     Prone on elbows, 20x  Prone on elbows  sustained, 2 min  PPT, 20x5s      Peripheral muscle strengthening which included 1 set of 15-20 repetitions at a slow, controlled 10-13 second per rep pace focused on strengthening supporting musculature for improved body mechanics and functional mobility.  Pt and therapist focused on proper form during treatment to ensure optimal strengthening of each targeted muscle group.  Machines were utilized including torso rotation, leg extension, leg curl, chest press, upright row. Tricep extension, bicep curl, leg press, and hip add/abduction.    [facility] 8/12/2019   Visit Number 10   VAS Pain Rating 0   Recumbent Bike Seat Pos. 10   Time 10   Extension in Lying 20   Extension in Standing -   Manual Therapy 8   Lumbar Extension Seat Pad -   Femur Restraint -   Top Dead Center -   Counterweight -   Lumbar Flexion -   Lumbar Extension -   Lumbar Peak Torque -   Min Torque -   Test Percent Gain in Strength from Initial  -   Lumbar Weight 75   Repetitions 15   Rating of Perceived Exertion 3   Ice - Z Lie (in min.) -         Katerina received the following manual therapy techniques: Joint mobilizations were applied to the: PA glides T6-L2 grade III  for 8 minutes.         Home Exercises Provided and Patient Education Provided     Education provided:   - postural correction, purpose of mobilization    Written Home Exercises Provided: not today.  Exercises were reviewed and Jenn was able to demonstrate them prior to the end of the session.  Jenn demonstrated good  understanding of the education provided.     See EMR under NA for exercises provided NA.      Assessment     Katerina had no c/o pain during session today. Demonstrates good motor control in available ROM on MedX lumbar machine during dynamic exercise.     Patient is making good progress towards established goals.  Pt will continue to benefit from skilled outpatient physical therapy to address the deficits stated in the impairment chart, provide pt/family  education and to maximize pt's level of independence in the home and community environment.     Anticipated Barriers for therapy: none  Pt's spiritual, cultural and educational needs considered and pt agreeable to plan of care and goals as stated below:       Goals:   Short term goals:  3 weeks  1.  Pt will demonstrate increased lumbar ROM by at least 10 degrees from the initial ROM value with improvements noted in functional ROM and ability to perform ADLs. ongoing  2.  Pt will demonstrate increased maximum isometric torque value by 25% when compared to the initial value resulting in improved ability to perform bending, lifting, and carrying activities safely, confidently. MET 7/22/19  3.  Patient report a reduction in worst pain score by 1-2 points for improved tolerance for gardening. ongoing  4.  Pt able to perform HEP correctly with minimal cueing or supervision from therapist to encourage independent management of symptoms. MET        Long term goals: 6 weeks  1. Pt will demonstrate increased lumbar ROM by at least 15 degrees from initial ROM value, resulting in improved ability to perform functional fwd bending while standing and sitting.  Ongoing  2. Pt will demonstrate increased maximum isometric torque value by 50% when compared to the initial value resulting in improved ability to perform bending, lifting, and carrying activities safely, confidently. Ongoing  3. Pt to demonstrate ability to independently control and reduce their pain through posture positioning and mechanical movements throughout a typical day. MET 7/22/19  4.  Pt will demonstrate reduced pain and improved functional outcomes as reported on the FOTO by reaching a score of CI = at least 1% but < 20% impaired, limited or restricted or less in order to demonstrate subjective improvement in pt's condition.  Ongoing  5. Pt will demonstrate independence with the HEP at discharge. Ongoing      Plan   Reassess next visit, possible discharge.

## 2019-08-16 ENCOUNTER — CLINICAL SUPPORT (OUTPATIENT)
Dept: REHABILITATION | Facility: HOSPITAL | Age: 64
End: 2019-08-16
Attending: ORTHOPAEDIC SURGERY
Payer: COMMERCIAL

## 2019-08-16 DIAGNOSIS — M54.50 CHRONIC BILATERAL LOW BACK PAIN WITHOUT SCIATICA: ICD-10-CM

## 2019-08-16 DIAGNOSIS — G89.29 CHRONIC BILATERAL LOW BACK PAIN WITHOUT SCIATICA: ICD-10-CM

## 2019-08-16 PROCEDURE — 97110 THERAPEUTIC EXERCISES: CPT | Mod: PO | Performed by: PHYSICAL THERAPIST

## 2019-08-16 NOTE — PROGRESS NOTES
[facility]      Name: Katerina Dickson  Clinic Number: [id number]    Therapy Diagnosis:   Encounter Diagnosis   Name Primary?    Chronic bilateral low back pain without sciatica      Physician: Nav Brower MD    Visit Date: 8/16/2019    Physician Orders: PT Eval and Treat [facility] lumbar  Medical Diagnosis from Referral: M54.16,G89.29 (ICD-10-CM) - Chronic radicular lumbar pain  Evaluation Date: 7/5/2019  Authorization Period Expiration: 12/31/19  Plan of Care Expiration: 8/16/19  Reassessment Due: 7/26/19  Visit # / Visits authorized: 11/30    Time In: 300pm  Time Out: 400pm  Total Billable Time: 60 minutes    Precautions: Standard    Pattern of pain determined: PEP1    Subjective   Katerina reports for no particular reason her midback is bothering her today, but she has been managing with good posture and stretches. Pt is ready for today to be discharge.     Patient reports tolerating previous visit well in re: to low back.  Patient reports their pain to be 0/10 on a 0-10 scale with 0 being no pain and 10 being the worst pain imaginable.  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[employer]  Leisure: biking, swimming, gardening, reading   Pts goals: be able to move without pain    Objective            Outcomes:  Initial score: 38% limited  Visit 5 score: 28% limited   Goal: <20%      Treatment    Pt was instructed in and performed the following:     Katerina received therapeutic exercises to develop/improved posture, cardiovascular endurance, muscular endurance, lumbar/cervical ROM, strength and muscular endurance for 57 minutes including the following exercises:     Prone on elbows, 20x  Prone on elbows sustained, 2 min  PPT, 20x5s      Peripheral muscle strengthening which included 1 set of 15-20 repetitions at a slow, controlled 10-13 second per rep pace focused on strengthening supporting musculature for improved body mechanics and  functional mobility.  Pt and therapist focused on proper form during treatment to ensure optimal strengthening of each targeted muscle group.  Machines were utilized including torso rotation, leg extension, leg curl, chest press, upright row. Tricep extension, bicep curl, leg press, and hip add/abduction.    [facility] 8/16/2019   Visit Number 11   VAS Pain Rating 0   Recumbent Bike Seat Pos. 10   Time 10   Extension in Lying 20   Extension in Standing -   Manual Therapy -   Lumbar Extension Seat Pad -   Femur Restraint -   Top Dead Center -   Counterweight -   Lumbar Flexion 45   Lumbar Extension 15   Lumbar Peak Torque 112   Min Torque 27   Test Percent Below Normative Data 0   Test Percent Gain in Strength from Initial  -   Lumbar Weight -   Repetitions -   Rating of Perceived Exertion -   Ice - Z Lie (in min.) -         Home Exercises Provided and Patient Education Provided     Education provided:   - postural correction, purpose of mobilization    Written Home Exercises Provided: not today.  Exercises were reviewed and Jenn was able to demonstrate them prior to the end of the session.  Jenn demonstrated good  understanding of the education provided.     See EMR under NA for exercises provided NA.      Assessment     Katerina is now demonstrating lumbar extensor strength at her age norm. She has been able to resume yardwork without pain, and has learned to manage her symptoms through postural control and stretching.     Pt's spiritual, cultural and educational needs considered and pt agreeable to plan of care and goals as stated below:       Goals:   Short term goals:  3 weeks  1.  Pt will demonstrate increased lumbar ROM by at least 10 degrees from the initial ROM value with improvements noted in functional ROM and ability to perform ADLs. NOT MET  2.  Pt will demonstrate increased maximum isometric torque value by 25% when compared to the initial value resulting in improved ability to perform bending,  lifting, and carrying activities safely, confidently. MET 7/22/19  3.  Patient report a reduction in worst pain score by 1-2 points for improved tolerance for gardening. MET  4.  Pt able to perform HEP correctly with minimal cueing or supervision from therapist to encourage independent management of symptoms. MET        Long term goals: 6 weeks  1. Pt will demonstrate increased lumbar ROM by at least 15 degrees from initial ROM value, resulting in improved ability to perform functional fwd bending while standing and sitting.  NOT MET  2. Pt will demonstrate increased maximum isometric torque value by 50% when compared to the initial value resulting in improved ability to perform bending, lifting, and carrying activities safely, confidently. Met  3. Pt to demonstrate ability to independently control and reduce their pain through posture positioning and mechanical movements throughout a typical day. MET 7/22/19  4.  Pt will demonstrate reduced pain and improved functional outcomes as reported on the FOTO by reaching a score of CI = at least 1% but < 20% impaired, limited or restricted or less in order to demonstrate subjective improvement in pt's condition.  Not met (last visit pt scored 30%)  5. Pt will demonstrate independence with the HEP at discharge. MET      Plan   Discharge 2* pt has reached max functional potential at this time.

## 2019-08-19 PROBLEM — M54.50 CHRONIC BILATERAL LOW BACK PAIN WITHOUT SCIATICA: Status: RESOLVED | Noted: 2019-07-05 | Resolved: 2019-08-19

## 2019-08-19 PROBLEM — G89.29 CHRONIC BILATERAL LOW BACK PAIN WITHOUT SCIATICA: Status: RESOLVED | Noted: 2019-07-05 | Resolved: 2019-08-19

## 2019-09-11 ENCOUNTER — PATIENT MESSAGE (OUTPATIENT)
Dept: FAMILY MEDICINE | Facility: CLINIC | Age: 64
End: 2019-09-11

## 2019-09-11 DIAGNOSIS — G89.29 CHRONIC MIDLINE THORACIC BACK PAIN: ICD-10-CM

## 2019-09-11 DIAGNOSIS — E03.9 HYPOTHYROIDISM, UNSPECIFIED TYPE: ICD-10-CM

## 2019-09-11 DIAGNOSIS — M54.6 CHRONIC MIDLINE THORACIC BACK PAIN: ICD-10-CM

## 2019-09-12 RX ORDER — LEVOTHYROXINE SODIUM 100 UG/1
100 TABLET ORAL DAILY
Qty: 90 TABLET | Refills: 3 | Status: SHIPPED | OUTPATIENT
Start: 2019-09-12 | End: 2020-11-25 | Stop reason: SDUPTHER

## 2019-09-12 RX ORDER — MELOXICAM 7.5 MG/1
7.5 TABLET ORAL 2 TIMES DAILY
Qty: 180 TABLET | Refills: 2 | Status: SHIPPED | OUTPATIENT
Start: 2019-09-12 | End: 2020-06-07

## 2019-12-12 ENCOUNTER — OFFICE VISIT (OUTPATIENT)
Dept: FAMILY MEDICINE | Facility: CLINIC | Age: 64
End: 2019-12-12
Payer: COMMERCIAL

## 2019-12-12 VITALS
SYSTOLIC BLOOD PRESSURE: 134 MMHG | RESPIRATION RATE: 18 BRPM | HEIGHT: 69 IN | TEMPERATURE: 98 F | BODY MASS INDEX: 22.62 KG/M2 | WEIGHT: 152.69 LBS | DIASTOLIC BLOOD PRESSURE: 70 MMHG | HEART RATE: 63 BPM | OXYGEN SATURATION: 97 %

## 2019-12-12 DIAGNOSIS — G89.29 CHRONIC MIDLINE THORACIC BACK PAIN: ICD-10-CM

## 2019-12-12 DIAGNOSIS — M54.6 CHRONIC MIDLINE THORACIC BACK PAIN: ICD-10-CM

## 2019-12-12 DIAGNOSIS — E03.9 HYPOTHYROIDISM, UNSPECIFIED TYPE: Primary | ICD-10-CM

## 2019-12-12 DIAGNOSIS — E04.2 MULTINODULAR GOITER: ICD-10-CM

## 2019-12-12 DIAGNOSIS — F51.01 PRIMARY INSOMNIA: ICD-10-CM

## 2019-12-12 DIAGNOSIS — Z23 NEED FOR SHINGLES VACCINE: ICD-10-CM

## 2019-12-12 DIAGNOSIS — G89.29 CHRONIC PAIN OF RIGHT KNEE: ICD-10-CM

## 2019-12-12 DIAGNOSIS — F33.0 MAJOR DEPRESSIVE DISORDER, RECURRENT, MILD: ICD-10-CM

## 2019-12-12 DIAGNOSIS — E78.5 HYPERLIPIDEMIA, UNSPECIFIED HYPERLIPIDEMIA TYPE: ICD-10-CM

## 2019-12-12 DIAGNOSIS — Z00.00 LABORATORY EXAMINATION ORDERED AS PART OF A ROUTINE GENERAL MEDICAL EXAMINATION: ICD-10-CM

## 2019-12-12 DIAGNOSIS — M25.561 CHRONIC PAIN OF RIGHT KNEE: ICD-10-CM

## 2019-12-12 DIAGNOSIS — Z23 NEED FOR IMMUNIZATION AGAINST INFLUENZA: ICD-10-CM

## 2019-12-12 DIAGNOSIS — R53.83 FATIGUE, UNSPECIFIED TYPE: ICD-10-CM

## 2019-12-12 DIAGNOSIS — Z12.31 ENCOUNTER FOR SCREENING MAMMOGRAM FOR MALIGNANT NEOPLASM OF BREAST: ICD-10-CM

## 2019-12-12 DIAGNOSIS — Z79.890 HORMONE REPLACEMENT THERAPY (HRT): ICD-10-CM

## 2019-12-12 PROCEDURE — 90686 FLU VACCINE (QUAD) GREATER THAN OR EQUAL TO 3YO PRESERVATIVE FREE IM: ICD-10-PCS | Mod: S$GLB,,, | Performed by: INTERNAL MEDICINE

## 2019-12-12 PROCEDURE — 90686 IIV4 VACC NO PRSV 0.5 ML IM: CPT | Mod: S$GLB,,, | Performed by: INTERNAL MEDICINE

## 2019-12-12 PROCEDURE — 99214 PR OFFICE/OUTPT VISIT, EST, LEVL IV, 30-39 MIN: ICD-10-PCS | Mod: 25,S$GLB,, | Performed by: INTERNAL MEDICINE

## 2019-12-12 PROCEDURE — 90471 IMMUNIZATION ADMIN: CPT | Mod: S$GLB,,, | Performed by: INTERNAL MEDICINE

## 2019-12-12 PROCEDURE — 90471 FLU VACCINE (QUAD) GREATER THAN OR EQUAL TO 3YO PRESERVATIVE FREE IM: ICD-10-PCS | Mod: S$GLB,,, | Performed by: INTERNAL MEDICINE

## 2019-12-12 PROCEDURE — 99214 OFFICE O/P EST MOD 30 MIN: CPT | Mod: 25,S$GLB,, | Performed by: INTERNAL MEDICINE

## 2019-12-12 NOTE — PROGRESS NOTES
Subjective:       Patient ID: Katerina Dickson is a 64 y.o. female.    Medication List with Changes/Refills   New Medications    VARICELLA-ZOSTER GE-AS01B, PF, (SHINGRIX, PF,) 50 MCG/0.5 ML INJECTION    Inject 0.5 mLs into the muscle once. for 1 dose   Current Medications    ACETAMINOPHEN (TYLENOL) 325 MG TABLET    Take 1 tablet (325 mg total) by mouth every 6 (six) hours as needed for Pain.    ALBUTEROL 90 MCG/ACTUATION INHALER    Inhale 2 puffs into the lungs every 6 (six) hours as needed for Wheezing. Rescue    ASPIRIN 81 MG CHEW    Take 81 mg by mouth once daily.    BUPROPION (WELLBUTRIN XL) 300 MG 24 HR TABLET    Take 1 tablet (300 mg total) by mouth once daily.    DOXYLAMINE SUCCINATE (UNISOM ORAL)    Take 100 mg by mouth every evening. 2 tablets nightly    ESTRADIOL (VIVELLE-DOT) 0.075 MG/24 HR    APPLY 1 PATCH EXTERNALLY 2 TIMES A WEEK    LACTOBACILLUS RHAMNOSUS GG (CULTURELLE) 10 BILLION CELL CAPSULE    Take 1 capsule by mouth every evening.     LEVOTHYROXINE (SYNTHROID) 100 MCG TABLET    Take 1 tablet (100 mcg total) by mouth once daily.    MELOXICAM (MOBIC) 7.5 MG TABLET    Take 1 tablet (7.5 mg total) by mouth 2 (two) times daily.    UNABLE TO FIND    Adrenal: Fatigue Fighter   [facility]   2 tablets daily       Chief Complaint: Follow-up (6 month follow up: Flu shot today)  She is here today to f/u on chronic medical issues.     She has depression that has been controlled since 2005 on wellbutrin 300 mg qday. She feels this is helping with her symptoms. She denies anxiety. She denies suicidal ideations. She is sleeping well with OTC sleep aid. She has a good appetite and has maintained her weight.     She does report increasing fatigue over the last couple months. No other associated symptoms. No chest pain or shortness of breath. No fevers or recent illness. No bruising or bleeding.  No exertional symptoms but she just feels like going to sleep.  She continues to exercise and is able to work  without any problems.      She has hypothyroid and is taking synthroid. Her last TSH was normal on 5/2019. She had a thyroid u/s done on 5/2019 that was unchanged from previous year and no nodules that met criteria for bx.      She has mild hyperlipidemia and is not on treatment. Last lipids were on 5/2019 were 233/120/58/151. She is careful with her diet.      She is taking HRT patches since her RUI in 1995. She is managed by gyn.      She has GERD and is taking OTC prevacid 15 mg qday. She feels this helps with her symptoms. She had her last EGD in 2016 that showed some gastric polyps but otherwise normal.        She was noted to have very low platelets that have now normalized. She was followed by hematology who feel that the platelets were artificially low due to clumping in the vial before processing. Repeat evaluation was normal.      At her last appt she was having right knee pain and low back pain. She was given mobic which she feels continues to help and referred to PT.  She feels if she does her home exercises regularly then she has no pain in her back or knees.        She has a history or chronic pancreatitis that required hospitalization x 3. Her last episode was in 2006 where she was hospitalized for one month and had gallbladder removed. She has not had episode since.       Colonoscopy---10/2017 repeat in 5 years  Mammogram--1/2019  neg  DEXA-----4/2016 nl  Pap-----RUI  Tdap---3/2014  Influenza vaccine----9/2017---due  Shingles vaccine-----11/2016      Review of Systems   Constitutional: Negative for appetite change, fatigue, fever and unexpected weight change.   HENT: Negative for congestion, ear pain, hearing loss, sore throat and trouble swallowing.    Eyes: Negative for pain and visual disturbance.   Respiratory: Negative for cough, chest tightness, shortness of breath and wheezing.    Cardiovascular: Negative for chest pain, palpitations and leg swelling.   Gastrointestinal: Negative for abdominal  "pain, blood in stool, constipation, diarrhea, nausea and vomiting.   Endocrine: Negative for polyuria.   Genitourinary: Negative for dysuria and hematuria.   Musculoskeletal: Negative for arthralgias, back pain and myalgias.   Skin: Negative for rash.   Neurological: Negative for dizziness, weakness, numbness and headaches.   Hematological: Does not bruise/bleed easily.   Psychiatric/Behavioral: Positive for sleep disturbance. Negative for dysphoric mood and suicidal ideas. The patient is not nervous/anxious.        Objective:      Vitals:    12/12/19 1022   BP: 134/70   Pulse: 63   Resp: 18   Temp: 97.9 °F (36.6 °C)   TempSrc: Oral   SpO2: 97%   Weight: 69.3 kg (152 lb 10.7 oz)   Height: 5' 9" (1.753 m)     Body mass index is 22.55 kg/m².  Physical Exam    General appearance: No acute distress, cooperative  Eyes: PERRL, EOMI, conjunctiva clear  Ears: normal external ear and pinna, tm clear without drainage, canals clear  Nose: Normal mucosa without drainage  Throat: no exudates or erythema, tonsils not enlarged  Mouth: no sores or lesions, moist mucous membranes  Neck: FROM, soft, supple, no thyromegaly, no bruits  Lymph: no anterior or posterior cervical adenopathy  Heart::  Regular rate and rhythm, no murmur  Lung: Clear to ascultation bilaterally, no wheezing, no rales, no rhonchi, no distress  Abdomen: Soft, nontender, no distention, no hepatosplenomegaly, bowel sounds normal, no guarding, no rebound, no peritoneal signs  Skin: no rashes, no lesions  Extremities: no edema, no cyanosis  Neuro: CN 2-12 intact, 5/5 muscle strength upper and lower extremity bilaterally, 2+ DTRs UE and LE bilaterally, normal gait  Peripheral pulses: 2+ pedal pulses bilaterally, good perfusion and color  Musculoskeletal: FROM, good strenth, no tenderness  Joint: normal appearance, no swelling, no warmth, no deformity in all joints    Assessment:       1. Hypothyroidism, unspecified type    2. Multinodular goiter    3. Hyperlipidemia, " unspecified hyperlipidemia type    4. Fatigue, unspecified type    5. Major depressive disorder, recurrent, mild    6. Primary insomnia    7. Chronic midline thoracic back pain    8. Chronic pain of right knee    9. Hormone replacement therapy (HRT)    10. Laboratory examination ordered as part of a routine general medical examination    11. Encounter for screening mammogram for malignant neoplasm of breast    12. Need for immunization against influenza    13. Need for shingles vaccine        Plan:       Hypothyroidism, unspecified type  Good control on this regimen.   -     TSH; Future; Expected date: 05/01/2020    Multinodular goiter  Stable and due to recheck u/s on 5/2020.     Hyperlipidemia, unspecified hyperlipidemia type  She is not on treatment and continue to monitor.   -     CBC auto differential; Future; Expected date: 05/01/2020  -     Comprehensive metabolic panel; Future; Expected date: 05/01/2020  -     Lipid panel; Future; Expected date: 05/01/2020    Fatigue, unspecified type  Question etiology. No red flags by history or exam.  Reassurance given and advised her to continue to monitor for any associated symptoms.     Major depressive disorder, recurrent, mild  Good control on wellbutrin.     Primary insomnia  Controlled on OTC sleep aid.     Chronic midline thoracic back pain  Resolved on mobic and home exercises.     Chronic pain of right knee  Pain improved after PT and she continues to do home exercises.     Hormone replacement therapy (HRT)  This is managed by her gyn.     Laboratory examination ordered as part of a routine general medical examination  -     CBC auto differential; Future; Expected date: 05/01/2020  -     Comprehensive metabolic panel; Future; Expected date: 05/01/2020  -     TSH; Future; Expected date: 05/01/2020  -     Lipid panel; Future; Expected date: 05/01/2020    Encounter for screening mammogram for malignant neoplasm of breast  -     Mammo Digital Screening Bilat;  Future; Expected date: 12/12/2019    Need for immunization against influenza  -     Influenza - Quadrivalent (PF)    Need for shingles vaccine  -     varicella-zoster gE-AS01B, PF, (SHINGRIX, PF,) 50 mcg/0.5 mL injection; Inject 0.5 mLs into the muscle once. for 1 dose  Dispense: 0.5 mL; Refill: 1    Follow up in about 6 months (around 6/12/2020) for chronic medical issues.

## 2020-01-06 ENCOUNTER — OFFICE VISIT (OUTPATIENT)
Dept: FAMILY MEDICINE | Facility: CLINIC | Age: 65
End: 2020-01-06
Payer: COMMERCIAL

## 2020-01-06 VITALS
SYSTOLIC BLOOD PRESSURE: 124 MMHG | DIASTOLIC BLOOD PRESSURE: 76 MMHG | WEIGHT: 150.13 LBS | HEIGHT: 69 IN | TEMPERATURE: 98 F | BODY MASS INDEX: 22.24 KG/M2 | HEART RATE: 72 BPM | OXYGEN SATURATION: 97 %

## 2020-01-06 DIAGNOSIS — J40 BRONCHITIS: Primary | ICD-10-CM

## 2020-01-06 DIAGNOSIS — J02.9 SORE THROAT: ICD-10-CM

## 2020-01-06 PROCEDURE — 99214 PR OFFICE/OUTPT VISIT, EST, LEVL IV, 30-39 MIN: ICD-10-PCS | Mod: S$GLB,,, | Performed by: NURSE PRACTITIONER

## 2020-01-06 PROCEDURE — 87070 CULTURE OTHR SPECIMN AEROBIC: CPT

## 2020-01-06 PROCEDURE — 99999 PR PBB SHADOW E&M-EST. PATIENT-LVL III: ICD-10-PCS | Mod: PBBFAC,,, | Performed by: NURSE PRACTITIONER

## 2020-01-06 PROCEDURE — 99999 PR PBB SHADOW E&M-EST. PATIENT-LVL III: CPT | Mod: PBBFAC,,, | Performed by: NURSE PRACTITIONER

## 2020-01-06 PROCEDURE — 99214 OFFICE O/P EST MOD 30 MIN: CPT | Mod: S$GLB,,, | Performed by: NURSE PRACTITIONER

## 2020-01-06 RX ORDER — PROMETHAZINE HYDROCHLORIDE AND DEXTROMETHORPHAN HYDROBROMIDE 6.25; 15 MG/5ML; MG/5ML
5 SYRUP ORAL NIGHTLY PRN
Qty: 118 ML | Refills: 0 | Status: SHIPPED | OUTPATIENT
Start: 2020-01-06 | End: 2020-01-16

## 2020-01-06 RX ORDER — METHYLPREDNISOLONE 4 MG/1
TABLET ORAL
Qty: 21 TABLET | Refills: 0 | Status: SHIPPED | OUTPATIENT
Start: 2020-01-07 | End: 2021-01-05

## 2020-01-06 NOTE — PROGRESS NOTES
Subjective:       Patient ID: Katerina Dickson is a 64 y.o. female.    Chief Complaint: Nasal Congestion and Sore Throat    HPI   Ms. Dickson is a new patient to me. She reports dry cough, chest tightness, sinus pain/pressure. She denies sputum production, fever. Cough keeping her up at night.   Vitals:    01/06/20 1031   BP: 124/76   Pulse: 72   Temp: 97.5 °F (36.4 °C)     Review of Systems   Constitutional: Positive for chills and fatigue. Negative for fever.   HENT: Negative for facial swelling and trouble swallowing.    Eyes: Negative for discharge and redness.   Respiratory: Positive for cough and chest tightness. Negative for shortness of breath.    Cardiovascular: Negative for chest pain and palpitations.   Gastrointestinal: Negative for diarrhea.   Genitourinary: Negative for difficulty urinating.   Musculoskeletal: Negative for gait problem.   Skin: Negative for pallor and rash.   Neurological: Negative for facial asymmetry and speech difficulty.   Psychiatric/Behavioral: Negative for confusion. The patient is not nervous/anxious.        Past Medical History:   Diagnosis Date    GERD (gastroesophageal reflux disease)     Hashimoto's thyroiditis 6/07    Hypothyroidism     Pancreatitis 1988,1995    Postmenopausal HRT (hormone replacement therapy)      Objective:      Physical Exam   Constitutional: She is oriented to person, place, and time. She does not have a sickly appearance. No distress.   HENT:   Head: Normocephalic.   Right Ear: Hearing, tympanic membrane, external ear and ear canal normal.   Left Ear: Hearing, external ear and ear canal normal. A middle ear effusion is present.   Nose: Nose normal.   Mouth/Throat: Uvula is midline, oropharynx is clear and moist and mucous membranes are normal.   Eyes: Conjunctivae and lids are normal.   Neck: No JVD present. No tracheal deviation present.   Cardiovascular: Normal rate and normal heart sounds.   Pulmonary/Chest: Effort normal and breath sounds normal.  She has no decreased breath sounds. She has no rales.   Abdominal: Normal appearance. She exhibits no distension.   Musculoskeletal: She exhibits no deformity.   Neurological: She is alert and oriented to person, place, and time.   Skin: She is not diaphoretic. No pallor.   Psychiatric: She has a normal mood and affect. Her speech is normal and behavior is normal. Judgment and thought content normal. Cognition and memory are normal.   Nursing note and vitals reviewed.      Assessment:       1. Bronchitis    2. Sore throat        Plan:       Bronchitis  -     methylPREDNISolone (MEDROL DOSEPACK) 4 mg tablet; use as directed  Dispense: 21 tablet; Refill: 0  -     promethazine-dextromethorphan (PROMETHAZINE-DM) 6.25-15 mg/5 mL Syrp; Take 5 mLs by mouth nightly as needed.  Dispense: 118 mL; Refill: 0    Sore throat  -     Throat culture    educated on supportive care, return precautions   Follow up for further evaluation if s/s worsen, fail to improve, or new symptoms arise.    Medication List with Changes/Refills   New Medications    METHYLPREDNISOLONE (MEDROL DOSEPACK) 4 MG TABLET    use as directed    PROMETHAZINE-DEXTROMETHORPHAN (PROMETHAZINE-DM) 6.25-15 MG/5 ML SYRP    Take 5 mLs by mouth nightly as needed.   Current Medications    ACETAMINOPHEN (TYLENOL) 325 MG TABLET    Take 1 tablet (325 mg total) by mouth every 6 (six) hours as needed for Pain.    ALBUTEROL 90 MCG/ACTUATION INHALER    Inhale 2 puffs into the lungs every 6 (six) hours as needed for Wheezing. Rescue    ASPIRIN 81 MG CHEW    Take 81 mg by mouth once daily.    BUPROPION (WELLBUTRIN XL) 300 MG 24 HR TABLET    Take 1 tablet (300 mg total) by mouth once daily.    DOXYLAMINE SUCCINATE (UNISOM ORAL)    Take 100 mg by mouth every evening. 2 tablets nightly    ESTRADIOL (VIVELLE-DOT) 0.075 MG/24 HR    APPLY 1 PATCH EXTERNALLY 2 TIMES A WEEK    LACTOBACILLUS RHAMNOSUS GG (CULTURELLE) 10 BILLION CELL CAPSULE    Take 1 capsule by mouth every evening.      LEVOTHYROXINE (SYNTHROID) 100 MCG TABLET    Take 1 tablet (100 mcg total) by mouth once daily.    MELOXICAM (MOBIC) 7.5 MG TABLET    Take 1 tablet (7.5 mg total) by mouth 2 (two) times daily.    UNABLE TO FIND    Adrenal: Fatigue Fighter   [place]   2 tablets daily

## 2020-01-08 LAB — BACTERIA THROAT CULT: NORMAL

## 2020-03-05 DIAGNOSIS — Z79.890 POSTMENOPAUSAL HRT (HORMONE REPLACEMENT THERAPY): ICD-10-CM

## 2020-03-05 RX ORDER — ESTRADIOL 0.07 MG/D
FILM, EXTENDED RELEASE TRANSDERMAL
Qty: 24 PATCH | Refills: 3 | Status: SHIPPED | OUTPATIENT
Start: 2020-03-05 | End: 2021-03-01

## 2020-05-14 ENCOUNTER — PATIENT OUTREACH (OUTPATIENT)
Dept: ADMINISTRATIVE | Facility: OTHER | Age: 65
End: 2020-05-14

## 2020-10-13 ENCOUNTER — TELEPHONE (OUTPATIENT)
Dept: OBSTETRICS AND GYNECOLOGY | Facility: CLINIC | Age: 65
End: 2020-10-13

## 2020-10-13 DIAGNOSIS — Z12.31 VISIT FOR SCREENING MAMMOGRAM: Primary | ICD-10-CM

## 2020-10-13 NOTE — TELEPHONE ENCOUNTER
----- Message from Tai Azevedo sent at 10/13/2020  8:45 AM CDT -----  Regarding: MAMMO Order for [facility]  Type: Needs Medical Advice    Who Called:  Ptnt  [phone]          Additional Information:     Advised needs order issued for MAMMO to be done at [facility]. Please advise when ready for scheduling.

## 2020-10-14 ENCOUNTER — HOSPITAL ENCOUNTER (OUTPATIENT)
Dept: RADIOLOGY | Facility: HOSPITAL | Age: 65
Discharge: HOME | End: 2020-10-14
Attending: OBSTETRICS & GYNECOLOGY
Payer: COMMERCIAL

## 2020-10-14 VITALS — HEIGHT: 69 IN | BODY MASS INDEX: 22.24 KG/M2 | WEIGHT: 150.13 LBS

## 2020-10-14 DIAGNOSIS — Z12.31 VISIT FOR SCREENING MAMMOGRAM: ICD-10-CM

## 2020-10-14 PROCEDURE — 77063 BREAST TOMOSYNTHESIS BI: CPT | Mod: 26,,, | Performed by: RADIOLOGY

## 2020-10-14 PROCEDURE — 77063 MAMMO DIGITAL SCREENING BILAT WITH TOMO: ICD-10-PCS | Mod: 26,,, | Performed by: RADIOLOGY

## 2020-10-14 PROCEDURE — 77067 SCR MAMMO BI INCL CAD: CPT | Mod: 26,,, | Performed by: RADIOLOGY

## 2020-10-14 PROCEDURE — 77067 SCR MAMMO BI INCL CAD: CPT | Mod: TC,PN

## 2020-10-14 PROCEDURE — 77067 MAMMO DIGITAL SCREENING BILAT WITH TOMO: ICD-10-PCS | Mod: 26,,, | Performed by: RADIOLOGY

## 2020-11-25 DIAGNOSIS — E03.9 HYPOTHYROIDISM, UNSPECIFIED TYPE: ICD-10-CM

## 2020-11-26 RX ORDER — LEVOTHYROXINE SODIUM 100 UG/1
100 TABLET ORAL DAILY
Qty: 90 TABLET | Refills: 3 | Status: SHIPPED | OUTPATIENT
Start: 2020-11-26 | End: 2021-11-21

## 2021-01-22 ENCOUNTER — TELEPHONE (OUTPATIENT)
Dept: FAMILY MEDICINE | Facility: CLINIC | Age: 66
End: 2021-01-22

## 2021-01-26 ENCOUNTER — HOSPITAL ENCOUNTER (OUTPATIENT)
Dept: RADIOLOGY | Facility: HOSPITAL | Age: 66
Discharge: HOME | End: 2021-01-26
Attending: INTERNAL MEDICINE
Payer: COMMERCIAL

## 2021-01-26 ENCOUNTER — TELEPHONE (OUTPATIENT)
Dept: FAMILY MEDICINE | Facility: CLINIC | Age: 66
End: 2021-01-26

## 2021-01-26 ENCOUNTER — TELEPHONE (OUTPATIENT)
Dept: NEUROLOGY | Facility: CLINIC | Age: 66
End: 2021-01-26

## 2021-01-26 ENCOUNTER — OFFICE VISIT (OUTPATIENT)
Dept: FAMILY MEDICINE | Facility: CLINIC | Age: 66
End: 2021-01-26
Payer: COMMERCIAL

## 2021-01-26 VITALS
TEMPERATURE: 98 F | HEART RATE: 78 BPM | OXYGEN SATURATION: 97 % | SYSTOLIC BLOOD PRESSURE: 128 MMHG | HEIGHT: 69 IN | BODY MASS INDEX: 22.58 KG/M2 | RESPIRATION RATE: 14 BRPM | WEIGHT: 152.44 LBS | DIASTOLIC BLOOD PRESSURE: 74 MMHG

## 2021-01-26 DIAGNOSIS — Z78.0 ASYMPTOMATIC MENOPAUSAL STATE: ICD-10-CM

## 2021-01-26 DIAGNOSIS — R22.30 AXILLARY FULLNESS: ICD-10-CM

## 2021-01-26 DIAGNOSIS — E78.5 HYPERLIPIDEMIA, UNSPECIFIED HYPERLIPIDEMIA TYPE: ICD-10-CM

## 2021-01-26 DIAGNOSIS — F33.0 MAJOR DEPRESSIVE DISORDER, RECURRENT, MILD: ICD-10-CM

## 2021-01-26 DIAGNOSIS — G54.0 BRACHIAL PLEXUS DISORDERS: ICD-10-CM

## 2021-01-26 DIAGNOSIS — G56.91 NEUROPATHY OF RIGHT UPPER EXTREMITY: ICD-10-CM

## 2021-01-26 DIAGNOSIS — Z79.890 HORMONE REPLACEMENT THERAPY (HRT): ICD-10-CM

## 2021-01-26 DIAGNOSIS — Z00.00 WELL ADULT EXAM: Primary | ICD-10-CM

## 2021-01-26 DIAGNOSIS — E03.9 HYPOTHYROIDISM, UNSPECIFIED TYPE: ICD-10-CM

## 2021-01-26 DIAGNOSIS — E04.2 MULTINODULAR GOITER: ICD-10-CM

## 2021-01-26 LAB — ERYTHROCYTE [SEDIMENTATION RATE] IN BLOOD BY WESTERGREN METHOD: 5 MM/HR (ref 0–36)

## 2021-01-26 PROCEDURE — 80053 COMPREHEN METABOLIC PANEL: CPT

## 2021-01-26 PROCEDURE — 72050 X-RAY EXAM NECK SPINE 4/5VWS: CPT | Mod: 26,,, | Performed by: RADIOLOGY

## 2021-01-26 PROCEDURE — 80061 LIPID PANEL: CPT

## 2021-01-26 PROCEDURE — 85652 RBC SED RATE AUTOMATED: CPT

## 2021-01-26 PROCEDURE — 72050 X-RAY EXAM NECK SPINE 4/5VWS: CPT | Mod: TC,FY,PO

## 2021-01-26 PROCEDURE — 72050 XR CERVICAL SPINE COMPLETE 5 VIEW: ICD-10-PCS | Mod: 26,,, | Performed by: RADIOLOGY

## 2021-01-26 PROCEDURE — 90686 IIV4 VACC NO PRSV 0.5 ML IM: CPT | Mod: S$GLB,,, | Performed by: INTERNAL MEDICINE

## 2021-01-26 PROCEDURE — 90471 IMMUNIZATION ADMIN: CPT | Mod: S$GLB,,, | Performed by: INTERNAL MEDICINE

## 2021-01-26 PROCEDURE — 86140 C-REACTIVE PROTEIN: CPT

## 2021-01-26 PROCEDURE — 86703 HIV-1/HIV-2 1 RESULT ANTBDY: CPT

## 2021-01-26 PROCEDURE — 85025 COMPLETE CBC W/AUTO DIFF WBC: CPT

## 2021-01-26 PROCEDURE — 90686 FLU VACCINE (QUAD) GREATER THAN OR EQUAL TO 3YO PRESERVATIVE FREE IM: ICD-10-PCS | Mod: S$GLB,,, | Performed by: INTERNAL MEDICINE

## 2021-01-26 PROCEDURE — 84443 ASSAY THYROID STIM HORMONE: CPT

## 2021-01-26 PROCEDURE — 90471 FLU VACCINE (QUAD) GREATER THAN OR EQUAL TO 3YO PRESERVATIVE FREE IM: ICD-10-PCS | Mod: S$GLB,,, | Performed by: INTERNAL MEDICINE

## 2021-01-26 PROCEDURE — 99396 PR PREVENTIVE VISIT,EST,40-64: ICD-10-PCS | Mod: 25,S$GLB,, | Performed by: INTERNAL MEDICINE

## 2021-01-26 PROCEDURE — 83036 HEMOGLOBIN GLYCOSYLATED A1C: CPT

## 2021-01-26 PROCEDURE — 99396 PREV VISIT EST AGE 40-64: CPT | Mod: 25,S$GLB,, | Performed by: INTERNAL MEDICINE

## 2021-01-26 RX ORDER — MELOXICAM 7.5 MG/1
7.5 TABLET ORAL
COMMUNITY
End: 2021-03-01

## 2021-01-27 ENCOUNTER — PATIENT MESSAGE (OUTPATIENT)
Dept: FAMILY MEDICINE | Facility: CLINIC | Age: 66
End: 2021-01-27

## 2021-01-27 ENCOUNTER — TELEPHONE (OUTPATIENT)
Dept: FAMILY MEDICINE | Facility: CLINIC | Age: 66
End: 2021-01-27

## 2021-01-27 LAB
ALBUMIN SERPL BCP-MCNC: 4.1 G/DL (ref 3.5–5.2)
ALP SERPL-CCNC: 56 U/L (ref 55–135)
ALT SERPL W/O P-5'-P-CCNC: 11 U/L (ref 10–44)
ANION GAP SERPL CALC-SCNC: 9 MMOL/L (ref 8–16)
AST SERPL-CCNC: 19 U/L (ref 10–40)
BASOPHILS # BLD AUTO: 0.04 K/UL (ref 0–0.2)
BASOPHILS NFR BLD: 0.8 % (ref 0–1.9)
BILIRUB SERPL-MCNC: 0.4 MG/DL (ref 0.1–1)
BUN SERPL-MCNC: 21 MG/DL (ref 8–23)
CALCIUM SERPL-MCNC: 8.8 MG/DL (ref 8.7–10.5)
CHLORIDE SERPL-SCNC: 104 MMOL/L (ref 95–110)
CHOLEST SERPL-MCNC: 253 MG/DL (ref 120–199)
CHOLEST/HDLC SERPL: 3.3 {RATIO} (ref 2–5)
CO2 SERPL-SCNC: 26 MMOL/L (ref 23–29)
CREAT SERPL-MCNC: 0.9 MG/DL (ref 0.5–1.4)
CRP SERPL-MCNC: 0.3 MG/L (ref 0–8.2)
DIFFERENTIAL METHOD: ABNORMAL
EOSINOPHIL # BLD AUTO: 0.1 K/UL (ref 0–0.5)
EOSINOPHIL NFR BLD: 1.6 % (ref 0–8)
ERYTHROCYTE [DISTWIDTH] IN BLOOD BY AUTOMATED COUNT: 13.2 % (ref 11.5–14.5)
EST. GFR  (AFRICAN AMERICAN): >60 ML/MIN/1.73 M^2
EST. GFR  (NON AFRICAN AMERICAN): >60 ML/MIN/1.73 M^2
ESTIMATED AVG GLUCOSE: 91 MG/DL (ref 68–131)
GLUCOSE SERPL-MCNC: 80 MG/DL (ref 70–110)
HBA1C MFR BLD HPLC: 4.8 % (ref 4–5.6)
HCT VFR BLD AUTO: 47.2 % (ref 37–48.5)
HDLC SERPL-MCNC: 76 MG/DL (ref 40–75)
HDLC SERPL: 30 % (ref 20–50)
HGB BLD-MCNC: 14.6 G/DL (ref 12–16)
HIV 1+2 AB+HIV1 P24 AG SERPL QL IA: NEGATIVE
IMM GRANULOCYTES # BLD AUTO: 0.01 K/UL (ref 0–0.04)
IMM GRANULOCYTES NFR BLD AUTO: 0.2 % (ref 0–0.5)
LDLC SERPL CALC-MCNC: 145.6 MG/DL (ref 63–159)
LYMPHOCYTES # BLD AUTO: 1.6 K/UL (ref 1–4.8)
LYMPHOCYTES NFR BLD: 31.2 % (ref 18–48)
MCH RBC QN AUTO: 32.1 PG (ref 27–31)
MCHC RBC AUTO-ENTMCNC: 30.9 G/DL (ref 32–36)
MCV RBC AUTO: 104 FL (ref 82–98)
MONOCYTES # BLD AUTO: 0.4 K/UL (ref 0.3–1)
MONOCYTES NFR BLD: 8.5 % (ref 4–15)
NEUTROPHILS # BLD AUTO: 2.9 K/UL (ref 1.8–7.7)
NEUTROPHILS NFR BLD: 57.7 % (ref 38–73)
NONHDLC SERPL-MCNC: 177 MG/DL
NRBC BLD-RTO: 0 /100 WBC
PLATELET # BLD AUTO: ABNORMAL K/UL (ref 150–350)
PLATELET BLD QL SMEAR: ABNORMAL
PMV BLD AUTO: ABNORMAL FL (ref 9.2–12.9)
POTASSIUM SERPL-SCNC: 4.7 MMOL/L (ref 3.5–5.1)
PROT SERPL-MCNC: 7.3 G/DL (ref 6–8.4)
RBC # BLD AUTO: 4.55 M/UL (ref 4–5.4)
SODIUM SERPL-SCNC: 139 MMOL/L (ref 136–145)
TRIGL SERPL-MCNC: 157 MG/DL (ref 30–150)
TSH SERPL DL<=0.005 MIU/L-ACNC: 1.89 UIU/ML (ref 0.4–4)
WBC # BLD AUTO: 5.07 K/UL (ref 3.9–12.7)

## 2021-01-27 RX ORDER — PREDNISONE 10 MG/1
TABLET ORAL
Qty: 42 TABLET | Refills: 0 | Status: SHIPPED | OUTPATIENT
Start: 2021-01-27 | End: 2021-03-08 | Stop reason: ALTCHOICE

## 2021-01-27 RX ORDER — GABAPENTIN 300 MG/1
300 CAPSULE ORAL NIGHTLY
Qty: 90 CAPSULE | Refills: 1 | Status: SHIPPED | OUTPATIENT
Start: 2021-01-27 | End: 2021-03-08 | Stop reason: SDUPTHER

## 2021-01-29 ENCOUNTER — PATIENT MESSAGE (OUTPATIENT)
Dept: FAMILY MEDICINE | Facility: CLINIC | Age: 66
End: 2021-01-29

## 2021-02-01 ENCOUNTER — HOSPITAL ENCOUNTER (OUTPATIENT)
Dept: RADIOLOGY | Facility: HOSPITAL | Age: 66
Discharge: HOME | End: 2021-02-01
Attending: INTERNAL MEDICINE
Payer: COMMERCIAL

## 2021-02-01 DIAGNOSIS — Z78.0 ASYMPTOMATIC MENOPAUSAL STATE: ICD-10-CM

## 2021-02-01 PROCEDURE — 77080 DEXA BONE DENSITY SPINE HIP: ICD-10-PCS | Mod: 26,,, | Performed by: RADIOLOGY

## 2021-02-01 PROCEDURE — 77080 DXA BONE DENSITY AXIAL: CPT | Mod: 26,,, | Performed by: RADIOLOGY

## 2021-02-01 PROCEDURE — 77080 DXA BONE DENSITY AXIAL: CPT | Mod: TC,PO

## 2021-02-02 ENCOUNTER — PATIENT MESSAGE (OUTPATIENT)
Dept: FAMILY MEDICINE | Facility: CLINIC | Age: 66
End: 2021-02-02

## 2021-02-17 ENCOUNTER — HOSPITAL ENCOUNTER (OUTPATIENT)
Dept: RADIOLOGY | Facility: HOSPITAL | Age: 66
Discharge: HOME | End: 2021-02-17
Attending: INTERNAL MEDICINE
Payer: COMMERCIAL

## 2021-02-17 DIAGNOSIS — G56.91 NEUROPATHY OF RIGHT UPPER EXTREMITY: ICD-10-CM

## 2021-02-17 PROCEDURE — 72156 MRI CERVICAL SPINE W WO CONTRAST: ICD-10-PCS | Mod: 26,,, | Performed by: RADIOLOGY

## 2021-02-17 PROCEDURE — 70543 MRI BRACHIAL PLEXUS W WO CONTRAST: ICD-10-PCS | Mod: 26,,, | Performed by: RADIOLOGY

## 2021-02-17 PROCEDURE — 72156 MRI NECK SPINE W/O & W/DYE: CPT | Mod: TC,PO

## 2021-02-17 PROCEDURE — 70543 MRI ORBT/FAC/NCK W/O &W/DYE: CPT | Mod: 26,,, | Performed by: RADIOLOGY

## 2021-02-17 PROCEDURE — 72156 MRI NECK SPINE W/O & W/DYE: CPT | Mod: 26,,, | Performed by: RADIOLOGY

## 2021-02-17 PROCEDURE — 25500020 PHARM REV CODE 255: Mod: PO | Performed by: INTERNAL MEDICINE

## 2021-02-17 PROCEDURE — 70543 MRI ORBT/FAC/NCK W/O &W/DYE: CPT | Mod: TC,PO

## 2021-02-17 PROCEDURE — A9585 GADOBUTROL INJECTION: HCPCS | Mod: PO | Performed by: INTERNAL MEDICINE

## 2021-02-17 RX ORDER — GADOBUTROL 604.72 MG/ML
6 INJECTION INTRAVENOUS
Status: COMPLETED | OUTPATIENT
Start: 2021-02-17 | End: 2021-02-17

## 2021-02-17 RX ADMIN — GADOBUTROL 6 ML: 604.72 INJECTION INTRAVENOUS at 02:02

## 2021-02-18 ENCOUNTER — PATIENT MESSAGE (OUTPATIENT)
Dept: FAMILY MEDICINE | Facility: CLINIC | Age: 66
End: 2021-02-18

## 2021-02-18 ENCOUNTER — PATIENT OUTREACH (OUTPATIENT)
Dept: ADMINISTRATIVE | Facility: OTHER | Age: 66
End: 2021-02-18

## 2021-02-22 ENCOUNTER — PROCEDURE VISIT (OUTPATIENT)
Dept: NEUROLOGY | Facility: CLINIC | Age: 66
End: 2021-02-22
Payer: COMMERCIAL

## 2021-02-22 ENCOUNTER — PATIENT MESSAGE (OUTPATIENT)
Dept: FAMILY MEDICINE | Facility: CLINIC | Age: 66
End: 2021-02-22

## 2021-02-22 DIAGNOSIS — R20.2 NUMBNESS AND TINGLING IN RIGHT HAND: ICD-10-CM

## 2021-02-22 DIAGNOSIS — R20.0 NUMBNESS AND TINGLING IN RIGHT HAND: ICD-10-CM

## 2021-02-22 DIAGNOSIS — M54.16 LUMBAR RADICULOPATHY: Primary | ICD-10-CM

## 2021-02-22 PROCEDURE — 95886 MUSC TEST DONE W/N TEST COMP: CPT | Mod: S$GLB,,, | Performed by: PSYCHIATRY & NEUROLOGY

## 2021-02-22 PROCEDURE — 95913 PR NERVE CONDUCTION STUDY; 13 OR MORE STUDIES: ICD-10-PCS | Mod: S$GLB,,, | Performed by: PSYCHIATRY & NEUROLOGY

## 2021-02-22 PROCEDURE — 95913 NRV CNDJ TEST 13/> STUDIES: CPT | Mod: S$GLB,,, | Performed by: PSYCHIATRY & NEUROLOGY

## 2021-02-22 PROCEDURE — 95886 PR EMG COMPLETE, W/ NERVE CONDUCTION STUDIES, 5+ MUSCLES: ICD-10-PCS | Mod: S$GLB,,, | Performed by: PSYCHIATRY & NEUROLOGY

## 2021-02-24 ENCOUNTER — PATIENT MESSAGE (OUTPATIENT)
Dept: FAMILY MEDICINE | Facility: CLINIC | Age: 66
End: 2021-02-24

## 2021-03-03 ENCOUNTER — TELEPHONE (OUTPATIENT)
Dept: FAMILY MEDICINE | Facility: CLINIC | Age: 66
End: 2021-03-03

## 2021-03-08 ENCOUNTER — OFFICE VISIT (OUTPATIENT)
Dept: FAMILY MEDICINE | Facility: CLINIC | Age: 66
End: 2021-03-08
Payer: COMMERCIAL

## 2021-03-08 VITALS
SYSTOLIC BLOOD PRESSURE: 138 MMHG | BODY MASS INDEX: 22.71 KG/M2 | TEMPERATURE: 99 F | WEIGHT: 153.31 LBS | OXYGEN SATURATION: 97 % | RESPIRATION RATE: 12 BRPM | DIASTOLIC BLOOD PRESSURE: 84 MMHG | HEART RATE: 89 BPM | HEIGHT: 69 IN

## 2021-03-08 DIAGNOSIS — G56.91 NEUROPATHY OF RIGHT UPPER EXTREMITY: Primary | ICD-10-CM

## 2021-03-08 DIAGNOSIS — M47.22 OSTEOARTHRITIS OF SPINE WITH RADICULOPATHY, CERVICAL REGION: ICD-10-CM

## 2021-03-08 PROCEDURE — 99214 OFFICE O/P EST MOD 30 MIN: CPT | Mod: S$GLB,,, | Performed by: INTERNAL MEDICINE

## 2021-03-08 PROCEDURE — 99214 PR OFFICE/OUTPT VISIT, EST, LEVL IV, 30-39 MIN: ICD-10-PCS | Mod: S$GLB,,, | Performed by: INTERNAL MEDICINE

## 2021-03-08 RX ORDER — GABAPENTIN 300 MG/1
300 CAPSULE ORAL 2 TIMES DAILY
Qty: 180 CAPSULE | Refills: 2 | Status: SHIPPED | OUTPATIENT
Start: 2021-03-08 | End: 2021-11-21

## 2021-03-09 ENCOUNTER — IMMUNIZATION (OUTPATIENT)
Dept: FAMILY MEDICINE | Facility: CLINIC | Age: 66
End: 2021-03-09
Payer: COMMERCIAL

## 2021-03-09 DIAGNOSIS — Z23 NEED FOR VACCINATION: Primary | ICD-10-CM

## 2021-03-09 PROCEDURE — 91300 COVID-19, MRNA, LNP-S, PF, 30 MCG/0.3 ML DOSE VACCINE: CPT | Mod: PBBFAC | Performed by: INTERNAL MEDICINE

## 2021-03-10 ENCOUNTER — OFFICE VISIT (OUTPATIENT)
Dept: NEUROSURGERY | Facility: CLINIC | Age: 66
End: 2021-03-10
Payer: COMMERCIAL

## 2021-03-10 VITALS
SYSTOLIC BLOOD PRESSURE: 132 MMHG | WEIGHT: 153.44 LBS | HEIGHT: 69 IN | HEART RATE: 75 BPM | TEMPERATURE: 97 F | RESPIRATION RATE: 18 BRPM | BODY MASS INDEX: 22.73 KG/M2 | DIASTOLIC BLOOD PRESSURE: 77 MMHG

## 2021-03-10 DIAGNOSIS — M47.22 OSTEOARTHRITIS OF SPINE WITH RADICULOPATHY, CERVICAL REGION: ICD-10-CM

## 2021-03-10 DIAGNOSIS — M50.30 DDD (DEGENERATIVE DISC DISEASE), CERVICAL: Primary | ICD-10-CM

## 2021-03-10 DIAGNOSIS — G99.2 STENOSIS OF CERVICAL SPINE WITH MYELOPATHY: ICD-10-CM

## 2021-03-10 DIAGNOSIS — M50.10 CERVICAL DISC DISORDER WITH RADICULOPATHY: ICD-10-CM

## 2021-03-10 DIAGNOSIS — M48.02 STENOSIS OF CERVICAL SPINE WITH MYELOPATHY: ICD-10-CM

## 2021-03-10 PROCEDURE — 99245 OFF/OP CONSLTJ NEW/EST HI 55: CPT | Mod: S$GLB,,, | Performed by: STUDENT IN AN ORGANIZED HEALTH CARE EDUCATION/TRAINING PROGRAM

## 2021-03-10 PROCEDURE — 99245 PR OFFICE CONSULTATION,LEVEL V: ICD-10-PCS | Mod: S$GLB,,, | Performed by: STUDENT IN AN ORGANIZED HEALTH CARE EDUCATION/TRAINING PROGRAM

## 2021-03-10 PROCEDURE — 99999 PR PBB SHADOW E&M-EST. PATIENT-LVL IV: CPT | Mod: PBBFAC,,, | Performed by: STUDENT IN AN ORGANIZED HEALTH CARE EDUCATION/TRAINING PROGRAM

## 2021-03-10 PROCEDURE — 99999 PR PBB SHADOW E&M-EST. PATIENT-LVL IV: ICD-10-PCS | Mod: PBBFAC,,, | Performed by: STUDENT IN AN ORGANIZED HEALTH CARE EDUCATION/TRAINING PROGRAM

## 2021-03-30 ENCOUNTER — IMMUNIZATION (OUTPATIENT)
Dept: FAMILY MEDICINE | Facility: CLINIC | Age: 66
End: 2021-03-30
Payer: COMMERCIAL

## 2021-03-30 DIAGNOSIS — Z23 NEED FOR VACCINATION: Primary | ICD-10-CM

## 2021-03-30 PROCEDURE — 0002A COVID-19, MRNA, LNP-S, PF, 30 MCG/0.3 ML DOSE VACCINE: CPT | Mod: PBBFAC | Performed by: FAMILY MEDICINE

## 2021-03-30 PROCEDURE — 91300 COVID-19, MRNA, LNP-S, PF, 30 MCG/0.3 ML DOSE VACCINE: CPT | Mod: PBBFAC | Performed by: FAMILY MEDICINE

## 2021-06-03 ENCOUNTER — PATIENT MESSAGE (OUTPATIENT)
Dept: FAMILY MEDICINE | Facility: CLINIC | Age: 66
End: 2021-06-03

## 2021-06-03 DIAGNOSIS — Z80.3 FAMILY HISTORY OF BREAST CANCER: ICD-10-CM

## 2021-06-03 DIAGNOSIS — Z91.89 AT HIGH RISK FOR BREAST CANCER: Primary | ICD-10-CM

## 2021-06-07 ENCOUNTER — TELEPHONE (OUTPATIENT)
Dept: FAMILY MEDICINE | Facility: CLINIC | Age: 66
End: 2021-06-07

## 2021-06-07 ENCOUNTER — PATIENT MESSAGE (OUTPATIENT)
Dept: FAMILY MEDICINE | Facility: CLINIC | Age: 66
End: 2021-06-07

## 2021-06-07 ENCOUNTER — LAB VISIT (OUTPATIENT)
Dept: LAB | Facility: HOSPITAL | Age: 66
End: 2021-06-07
Attending: INTERNAL MEDICINE
Payer: COMMERCIAL

## 2021-06-07 DIAGNOSIS — Z80.3 FAMILY HISTORY OF BREAST CANCER: Primary | ICD-10-CM

## 2021-06-07 DIAGNOSIS — Z80.3 FAMILY HISTORY OF BREAST CANCER: ICD-10-CM

## 2021-06-07 DIAGNOSIS — Z91.89 AT HIGH RISK FOR BREAST CANCER: ICD-10-CM

## 2021-06-07 PROCEDURE — 36415 COLL VENOUS BLD VENIPUNCTURE: CPT | Mod: PO | Performed by: INTERNAL MEDICINE

## 2021-06-07 PROCEDURE — 81162 BRCA1&2 GEN FULL SEQ DUP/DEL: CPT | Performed by: INTERNAL MEDICINE

## 2021-06-18 ENCOUNTER — PATIENT MESSAGE (OUTPATIENT)
Dept: FAMILY MEDICINE | Facility: CLINIC | Age: 66
End: 2021-06-18

## 2021-06-18 DIAGNOSIS — Z80.3 FAMILY HISTORY OF BREAST CANCER: Primary | ICD-10-CM

## 2021-06-18 LAB — BRCA SEQ, DEL/DUP INTERP: NORMAL

## 2021-06-24 ENCOUNTER — PATIENT MESSAGE (OUTPATIENT)
Dept: FAMILY MEDICINE | Facility: CLINIC | Age: 66
End: 2021-06-24

## 2021-07-07 ENCOUNTER — HOSPITAL ENCOUNTER (OUTPATIENT)
Dept: RADIOLOGY | Facility: HOSPITAL | Age: 66
Discharge: HOME | End: 2021-07-07
Attending: NURSE PRACTITIONER
Payer: COMMERCIAL

## 2021-07-07 ENCOUNTER — OFFICE VISIT (OUTPATIENT)
Dept: FAMILY MEDICINE | Facility: CLINIC | Age: 66
End: 2021-07-07
Payer: COMMERCIAL

## 2021-07-07 VITALS
HEART RATE: 68 BPM | WEIGHT: 149.5 LBS | SYSTOLIC BLOOD PRESSURE: 122 MMHG | TEMPERATURE: 98 F | DIASTOLIC BLOOD PRESSURE: 82 MMHG | RESPIRATION RATE: 20 BRPM | OXYGEN SATURATION: 96 % | BODY MASS INDEX: 22.07 KG/M2

## 2021-07-07 DIAGNOSIS — M79.672 LEFT FOOT PAIN: Primary | ICD-10-CM

## 2021-07-07 DIAGNOSIS — M79.672 LEFT FOOT PAIN: ICD-10-CM

## 2021-07-07 PROCEDURE — 99213 PR OFFICE/OUTPT VISIT, EST, LEVL III, 20-29 MIN: ICD-10-PCS | Mod: S$GLB,,, | Performed by: NURSE PRACTITIONER

## 2021-07-07 PROCEDURE — 73630 XR FOOT COMPLETE 3 VIEW LEFT: ICD-10-PCS | Mod: 26,LT,, | Performed by: RADIOLOGY

## 2021-07-07 PROCEDURE — 73630 X-RAY EXAM OF FOOT: CPT | Mod: TC,FY,PO,LT

## 2021-07-07 PROCEDURE — 99999 PR PBB SHADOW E&M-EST. PATIENT-LVL IV: CPT | Mod: PBBFAC,,, | Performed by: NURSE PRACTITIONER

## 2021-07-07 PROCEDURE — 73630 X-RAY EXAM OF FOOT: CPT | Mod: 26,LT,, | Performed by: RADIOLOGY

## 2021-07-07 PROCEDURE — 99213 OFFICE O/P EST LOW 20 MIN: CPT | Mod: S$GLB,,, | Performed by: NURSE PRACTITIONER

## 2021-07-07 PROCEDURE — 99999 PR PBB SHADOW E&M-EST. PATIENT-LVL IV: ICD-10-PCS | Mod: PBBFAC,,, | Performed by: NURSE PRACTITIONER

## 2021-07-07 RX ORDER — METHYLPREDNISOLONE 4 MG/1
TABLET ORAL
Qty: 1 PACKAGE | Refills: 0 | Status: SHIPPED | OUTPATIENT
Start: 2021-07-07 | End: 2021-07-28

## 2021-07-12 ENCOUNTER — PATIENT MESSAGE (OUTPATIENT)
Dept: FAMILY MEDICINE | Facility: CLINIC | Age: 66
End: 2021-07-12

## 2021-07-14 ENCOUNTER — TELEPHONE (OUTPATIENT)
Dept: FAMILY MEDICINE | Facility: CLINIC | Age: 66
End: 2021-07-14

## 2021-07-16 ENCOUNTER — TELEPHONE (OUTPATIENT)
Dept: PODIATRY | Facility: CLINIC | Age: 66
End: 2021-07-16

## 2021-09-20 ENCOUNTER — OFFICE VISIT (OUTPATIENT)
Dept: PODIATRY | Facility: CLINIC | Age: 66
End: 2021-09-20
Payer: COMMERCIAL

## 2021-09-20 VITALS — SYSTOLIC BLOOD PRESSURE: 155 MMHG | HEART RATE: 75 BPM | DIASTOLIC BLOOD PRESSURE: 76 MMHG

## 2021-09-20 DIAGNOSIS — M72.2 PLANTAR FASCIITIS OF LEFT FOOT: Primary | ICD-10-CM

## 2021-09-20 DIAGNOSIS — M21.6X1 ACQUIRED EQUINUS DEFORMITY OF BOTH FEET: ICD-10-CM

## 2021-09-20 DIAGNOSIS — M25.572 SINUS TARSITIS, LEFT: ICD-10-CM

## 2021-09-20 DIAGNOSIS — M21.6X2 ACQUIRED EQUINUS DEFORMITY OF BOTH FEET: ICD-10-CM

## 2021-09-20 PROCEDURE — 20550 NJX 1 TENDON SHEATH/LIGAMENT: CPT | Mod: LT,S$GLB,, | Performed by: PODIATRIST

## 2021-09-20 PROCEDURE — 99999 PR PBB SHADOW E&M-EST. PATIENT-LVL III: ICD-10-PCS | Mod: PBBFAC,,, | Performed by: PODIATRIST

## 2021-09-20 PROCEDURE — 20550 PR INJECT TENDON SHEATH/LIGAMENT: ICD-10-PCS | Mod: LT,S$GLB,, | Performed by: PODIATRIST

## 2021-09-20 PROCEDURE — 99203 OFFICE O/P NEW LOW 30 MIN: CPT | Mod: 25,S$GLB,, | Performed by: PODIATRIST

## 2021-09-20 PROCEDURE — 99999 PR PBB SHADOW E&M-EST. PATIENT-LVL III: CPT | Mod: PBBFAC,,, | Performed by: PODIATRIST

## 2021-09-20 PROCEDURE — 99203 PR OFFICE/OUTPT VISIT, NEW, LEVL III, 30-44 MIN: ICD-10-PCS | Mod: 25,S$GLB,, | Performed by: PODIATRIST

## 2021-09-20 RX ORDER — DEXAMETHASONE SODIUM PHOSPHATE 4 MG/ML
2 INJECTION, SOLUTION INTRA-ARTICULAR; INTRALESIONAL; INTRAMUSCULAR; INTRAVENOUS; SOFT TISSUE
Status: COMPLETED | OUTPATIENT
Start: 2021-09-20 | End: 2021-09-20

## 2021-09-20 RX ORDER — LIDOCAINE HYDROCHLORIDE 10 MG/ML
1 INJECTION INFILTRATION; PERINEURAL
Status: COMPLETED | OUTPATIENT
Start: 2021-09-20 | End: 2021-09-20

## 2021-09-20 RX ORDER — METHYLPREDNISOLONE ACETATE 40 MG/ML
20 INJECTION, SUSPENSION INTRA-ARTICULAR; INTRALESIONAL; INTRAMUSCULAR; SOFT TISSUE
Status: COMPLETED | OUTPATIENT
Start: 2021-09-20 | End: 2021-09-20

## 2021-09-20 RX ADMIN — METHYLPREDNISOLONE ACETATE 20 MG: 40 INJECTION, SUSPENSION INTRA-ARTICULAR; INTRALESIONAL; INTRAMUSCULAR; SOFT TISSUE at 07:09

## 2021-09-20 RX ADMIN — LIDOCAINE HYDROCHLORIDE 1 ML: 10 INJECTION INFILTRATION; PERINEURAL at 07:09

## 2021-09-20 RX ADMIN — DEXAMETHASONE SODIUM PHOSPHATE 2 MG: 4 INJECTION, SOLUTION INTRA-ARTICULAR; INTRALESIONAL; INTRAMUSCULAR; INTRAVENOUS; SOFT TISSUE at 07:09

## 2021-10-05 ENCOUNTER — TELEPHONE (OUTPATIENT)
Dept: OBSTETRICS AND GYNECOLOGY | Facility: CLINIC | Age: 66
End: 2021-10-05

## 2021-10-05 DIAGNOSIS — Z12.31 VISIT FOR SCREENING MAMMOGRAM: Primary | ICD-10-CM

## 2021-11-01 ENCOUNTER — OFFICE VISIT (OUTPATIENT)
Dept: PODIATRY | Facility: CLINIC | Age: 66
End: 2021-11-01
Payer: COMMERCIAL

## 2021-11-01 DIAGNOSIS — M21.6X1 ACQUIRED EQUINUS DEFORMITY OF BOTH FEET: ICD-10-CM

## 2021-11-01 DIAGNOSIS — M21.6X2 ACQUIRED EQUINUS DEFORMITY OF BOTH FEET: ICD-10-CM

## 2021-11-01 DIAGNOSIS — M72.2 PLANTAR FASCIITIS OF LEFT FOOT: Primary | ICD-10-CM

## 2021-11-01 PROCEDURE — 99212 PR OFFICE/OUTPT VISIT, EST, LEVL II, 10-19 MIN: ICD-10-PCS | Mod: S$GLB,,, | Performed by: PODIATRIST

## 2021-11-01 PROCEDURE — 99212 OFFICE O/P EST SF 10 MIN: CPT | Mod: S$GLB,,, | Performed by: PODIATRIST

## 2021-11-01 PROCEDURE — 99999 PR PBB SHADOW E&M-EST. PATIENT-LVL III: ICD-10-PCS | Mod: PBBFAC,,, | Performed by: PODIATRIST

## 2021-11-01 PROCEDURE — 99999 PR PBB SHADOW E&M-EST. PATIENT-LVL III: CPT | Mod: PBBFAC,,, | Performed by: PODIATRIST

## 2021-11-11 ENCOUNTER — OFFICE VISIT (OUTPATIENT)
Dept: OBSTETRICS AND GYNECOLOGY | Facility: CLINIC | Age: 66
End: 2021-11-11
Payer: COMMERCIAL

## 2021-11-11 ENCOUNTER — HOSPITAL ENCOUNTER (OUTPATIENT)
Dept: RADIOLOGY | Facility: HOSPITAL | Age: 66
Discharge: HOME | End: 2021-11-11
Attending: OBSTETRICS & GYNECOLOGY
Payer: COMMERCIAL

## 2021-11-11 VITALS
WEIGHT: 149.69 LBS | DIASTOLIC BLOOD PRESSURE: 84 MMHG | SYSTOLIC BLOOD PRESSURE: 140 MMHG | HEIGHT: 69 IN | BODY MASS INDEX: 22.17 KG/M2

## 2021-11-11 DIAGNOSIS — Z12.31 VISIT FOR SCREENING MAMMOGRAM: ICD-10-CM

## 2021-11-11 DIAGNOSIS — Z01.419 ROUTINE GYNECOLOGICAL EXAMINATION: Primary | ICD-10-CM

## 2021-11-11 DIAGNOSIS — Z79.890 POSTMENOPAUSAL HRT (HORMONE REPLACEMENT THERAPY): ICD-10-CM

## 2021-11-11 PROCEDURE — 77063 MAMMO DIGITAL SCREENING BILAT WITH TOMO: ICD-10-PCS | Mod: 26,,, | Performed by: RADIOLOGY

## 2021-11-11 PROCEDURE — 77067 SCR MAMMO BI INCL CAD: CPT | Mod: TC,PN

## 2021-11-11 PROCEDURE — 77067 SCR MAMMO BI INCL CAD: CPT | Mod: 26,,, | Performed by: RADIOLOGY

## 2021-11-11 PROCEDURE — 77067 MAMMO DIGITAL SCREENING BILAT WITH TOMO: ICD-10-PCS | Mod: 26,,, | Performed by: RADIOLOGY

## 2021-11-11 PROCEDURE — 77063 BREAST TOMOSYNTHESIS BI: CPT | Mod: 26,,, | Performed by: RADIOLOGY

## 2021-11-11 PROCEDURE — 99999 PR PBB SHADOW E&M-EST. PATIENT-LVL III: ICD-10-PCS | Mod: PBBFAC,,, | Performed by: OBSTETRICS & GYNECOLOGY

## 2021-11-11 PROCEDURE — 99397 PR PREVENTIVE VISIT,EST,65 & OVER: ICD-10-PCS | Mod: S$GLB,,, | Performed by: OBSTETRICS & GYNECOLOGY

## 2021-11-11 PROCEDURE — 99999 PR PBB SHADOW E&M-EST. PATIENT-LVL III: CPT | Mod: PBBFAC,,, | Performed by: OBSTETRICS & GYNECOLOGY

## 2021-11-11 PROCEDURE — 99397 PER PM REEVAL EST PAT 65+ YR: CPT | Mod: S$GLB,,, | Performed by: OBSTETRICS & GYNECOLOGY

## 2021-11-11 RX ORDER — ESTRADIOL 0.07 MG/D
FILM, EXTENDED RELEASE TRANSDERMAL
Qty: 24 PATCH | Refills: 3 | Status: SHIPPED | OUTPATIENT
Start: 2021-11-11 | End: 2022-02-14

## 2022-01-18 ENCOUNTER — IMMUNIZATION (OUTPATIENT)
Dept: FAMILY MEDICINE | Facility: CLINIC | Age: 67
End: 2022-01-18
Payer: COMMERCIAL

## 2022-01-18 DIAGNOSIS — Z23 NEED FOR VACCINATION: Primary | ICD-10-CM

## 2022-01-18 PROCEDURE — 0004A COVID-19, MRNA, LNP-S, PF, 30 MCG/0.3 ML DOSE VACCINE: CPT | Mod: PBBFAC | Performed by: FAMILY MEDICINE

## 2022-01-27 ENCOUNTER — PATIENT MESSAGE (OUTPATIENT)
Dept: FAMILY MEDICINE | Facility: CLINIC | Age: 67
End: 2022-01-27

## 2022-01-27 ENCOUNTER — LAB VISIT (OUTPATIENT)
Dept: LAB | Facility: HOSPITAL | Age: 67
End: 2022-01-27
Attending: INTERNAL MEDICINE
Payer: COMMERCIAL

## 2022-01-27 ENCOUNTER — OFFICE VISIT (OUTPATIENT)
Dept: FAMILY MEDICINE | Facility: CLINIC | Age: 67
End: 2022-01-27
Payer: COMMERCIAL

## 2022-01-27 VITALS
WEIGHT: 152.25 LBS | DIASTOLIC BLOOD PRESSURE: 84 MMHG | SYSTOLIC BLOOD PRESSURE: 130 MMHG | OXYGEN SATURATION: 96 % | RESPIRATION RATE: 12 BRPM | HEART RATE: 70 BPM | TEMPERATURE: 98 F | BODY MASS INDEX: 22.55 KG/M2 | HEIGHT: 69 IN

## 2022-01-27 DIAGNOSIS — E03.9 HYPOTHYROIDISM, UNSPECIFIED TYPE: ICD-10-CM

## 2022-01-27 DIAGNOSIS — Z79.890 HORMONE REPLACEMENT THERAPY (HRT): ICD-10-CM

## 2022-01-27 DIAGNOSIS — M47.22 OSTEOARTHRITIS OF SPINE WITH RADICULOPATHY, CERVICAL REGION: ICD-10-CM

## 2022-01-27 DIAGNOSIS — E78.5 HYPERLIPIDEMIA, UNSPECIFIED HYPERLIPIDEMIA TYPE: ICD-10-CM

## 2022-01-27 DIAGNOSIS — Z00.00 WELL ADULT EXAM: Primary | ICD-10-CM

## 2022-01-27 DIAGNOSIS — Z00.00 WELL ADULT EXAM: ICD-10-CM

## 2022-01-27 DIAGNOSIS — E04.2 MULTINODULAR GOITER: ICD-10-CM

## 2022-01-27 DIAGNOSIS — F33.0 MAJOR DEPRESSIVE DISORDER, RECURRENT, MILD: ICD-10-CM

## 2022-01-27 DIAGNOSIS — F51.01 PRIMARY INSOMNIA: ICD-10-CM

## 2022-01-27 LAB
ALBUMIN SERPL BCP-MCNC: 3.9 G/DL (ref 3.5–5.2)
ALP SERPL-CCNC: 63 U/L (ref 55–135)
ALT SERPL W/O P-5'-P-CCNC: 13 U/L (ref 10–44)
ANION GAP SERPL CALC-SCNC: 6 MMOL/L (ref 8–16)
AST SERPL-CCNC: 19 U/L (ref 10–40)
BASOPHILS # BLD AUTO: 0.06 K/UL (ref 0–0.2)
BASOPHILS NFR BLD: 0.9 % (ref 0–1.9)
BILIRUB SERPL-MCNC: 0.4 MG/DL (ref 0.1–1)
BUN SERPL-MCNC: 18 MG/DL (ref 8–23)
CALCIUM SERPL-MCNC: 9.5 MG/DL (ref 8.7–10.5)
CHLORIDE SERPL-SCNC: 105 MMOL/L (ref 95–110)
CHOLEST SERPL-MCNC: 237 MG/DL (ref 120–199)
CHOLEST/HDLC SERPL: 3.4 {RATIO} (ref 2–5)
CO2 SERPL-SCNC: 28 MMOL/L (ref 23–29)
CREAT SERPL-MCNC: 0.8 MG/DL (ref 0.5–1.4)
DIFFERENTIAL METHOD: ABNORMAL
EOSINOPHIL # BLD AUTO: 0.2 K/UL (ref 0–0.5)
EOSINOPHIL NFR BLD: 2.5 % (ref 0–8)
ERYTHROCYTE [DISTWIDTH] IN BLOOD BY AUTOMATED COUNT: 12.7 % (ref 11.5–14.5)
EST. GFR  (AFRICAN AMERICAN): >60 ML/MIN/1.73 M^2
EST. GFR  (NON AFRICAN AMERICAN): >60 ML/MIN/1.73 M^2
GLUCOSE SERPL-MCNC: 94 MG/DL (ref 70–110)
HCT VFR BLD AUTO: 43.4 % (ref 37–48.5)
HDLC SERPL-MCNC: 69 MG/DL (ref 40–75)
HDLC SERPL: 29.1 % (ref 20–50)
HGB BLD-MCNC: 13.6 G/DL (ref 12–16)
IMM GRANULOCYTES # BLD AUTO: 0.02 K/UL (ref 0–0.04)
IMM GRANULOCYTES NFR BLD AUTO: 0.3 % (ref 0–0.5)
LDLC SERPL CALC-MCNC: 146.6 MG/DL (ref 63–159)
LYMPHOCYTES # BLD AUTO: 1.8 K/UL (ref 1–4.8)
LYMPHOCYTES NFR BLD: 26.4 % (ref 18–48)
MCH RBC QN AUTO: 31.3 PG (ref 27–31)
MCHC RBC AUTO-ENTMCNC: 31.3 G/DL (ref 32–36)
MCV RBC AUTO: 100 FL (ref 82–98)
MONOCYTES # BLD AUTO: 0.6 K/UL (ref 0.3–1)
MONOCYTES NFR BLD: 8.2 % (ref 4–15)
NEUTROPHILS # BLD AUTO: 4.2 K/UL (ref 1.8–7.7)
NEUTROPHILS NFR BLD: 61.7 % (ref 38–73)
NONHDLC SERPL-MCNC: 168 MG/DL
NRBC BLD-RTO: 0 /100 WBC
PLATELET # BLD AUTO: 52 K/UL (ref 150–450)
PMV BLD AUTO: 12.7 FL (ref 9.2–12.9)
POTASSIUM SERPL-SCNC: 4.4 MMOL/L (ref 3.5–5.1)
PROT SERPL-MCNC: 7.5 G/DL (ref 6–8.4)
RBC # BLD AUTO: 4.35 M/UL (ref 4–5.4)
SODIUM SERPL-SCNC: 139 MMOL/L (ref 136–145)
TRIGL SERPL-MCNC: 107 MG/DL (ref 30–150)
TSH SERPL DL<=0.005 MIU/L-ACNC: 3.9 UIU/ML (ref 0.4–4)
WBC # BLD AUTO: 6.86 K/UL (ref 3.9–12.7)

## 2022-01-27 PROCEDURE — 36415 COLL VENOUS BLD VENIPUNCTURE: CPT | Mod: PO | Performed by: INTERNAL MEDICINE

## 2022-01-27 PROCEDURE — 80061 LIPID PANEL: CPT | Performed by: INTERNAL MEDICINE

## 2022-01-27 PROCEDURE — 84443 ASSAY THYROID STIM HORMONE: CPT | Performed by: INTERNAL MEDICINE

## 2022-01-27 PROCEDURE — 85025 COMPLETE CBC W/AUTO DIFF WBC: CPT | Performed by: INTERNAL MEDICINE

## 2022-01-27 PROCEDURE — 80053 COMPREHEN METABOLIC PANEL: CPT | Performed by: INTERNAL MEDICINE

## 2022-01-27 PROCEDURE — 99397 PER PM REEVAL EST PAT 65+ YR: CPT | Mod: S$GLB,,, | Performed by: INTERNAL MEDICINE

## 2022-01-27 PROCEDURE — 99397 PR PREVENTIVE VISIT,EST,65 & OVER: ICD-10-PCS | Mod: S$GLB,,, | Performed by: INTERNAL MEDICINE

## 2022-01-27 RX ORDER — MIRTAZAPINE 7.5 MG/1
7.5 TABLET, FILM COATED ORAL NIGHTLY
Qty: 30 TABLET | Refills: 11 | Status: SHIPPED | OUTPATIENT
Start: 2022-01-27 | End: 2023-01-12

## 2022-01-27 NOTE — PROGRESS NOTES
Subjective:       Patient ID: Katerina Dickson is a 66 y.o. female.    Medication List with Changes/Refills   New Medications    MIRTAZAPINE (REMERON) 7.5 MG TAB    Take 1 tablet (7.5 mg total) by mouth every evening.   Current Medications    ASPIRIN 81 MG CHEW    Take 81 mg by mouth once daily.    BIOTIN ORAL    Take by mouth Daily.    BUPROPION (WELLBUTRIN XL) 300 MG 24 HR TABLET    TAKE 1 TABLET BY MOUTH EVERY DAY    DOXYLAMINE SUCCINATE (UNISOM ORAL)    Take 100 mg by mouth every evening. 2 tablets nightly    ESTRADIOL (VIVELLE-DOT) 0.075 MG/24 HR    APPLY 1 PATCH EXTERNALLY TO THE SKIN 2 TIMES A WEEK    GABAPENTIN (NEURONTIN) 300 MG CAPSULE    TAKE 1 CAPSULE(300 MG) BY MOUTH TWICE DAILY    LEVOTHYROXINE (SYNTHROID) 100 MCG TABLET    TAKE 1 TABLET(100 MCG) BY MOUTH EVERY DAY    MAGNESIUM ORAL    Take by mouth Daily.    MELOXICAM (MOBIC) 7.5 MG TABLET    TAKE 1 TABLET(7.5 MG) BY MOUTH TWICE DAILY       Chief Complaint: Annual Exam  She is here today to f/u on chronic medical issues.      She has depression that has been controlled since 2005 on wellbutrin 300 mg qday. She feels this is helping with her symptoms. She denies anxiety. She denies suicidal ideations.    She has insomnia for many years and is now using unisom 100 mg qhs to sleep. She still struggles to fall asleep on this dose of medication and if she stops taking then she does not sleep at all.       She has hypothyroid and is taking synthroid 100 mcg qday. Her last TSH was normal on 1/2021. She had a thyroid u/s done on 5/2019 that was unchanged from previous year and no nodules that met criteria for bx.      She has mild hyperlipidemia and is not on treatment. Last lipids on 1/2021 were 253/157/76/145. She is careful with her diet.      She is taking HRT patches since her RUI in 1995. She is managed by gyn.      She has GERD and is taking OTC prevacid 15 mg qday. She feels this helps with her symptoms. She had her last EGD in 2016 that showed some  gastric polyps but otherwise normal.        She was noted to have very low platelets that have now normalized. She was followed by hematology who feel that the platelets were artificially low due to clumping in the vial before processing. Repeat evaluation was normal.     Last year she developed a fullness in her right axilla and tingling down her arm. She has EMG that showed right cervical radiculopathy.  MRI of the brachial plexus was reassuring and MRI of the cervical spine showed degenerative changes most pronounced at C5-6 and result in severe right and moderate left neural foraminal narrowing and moderate spinal canal stenosis. Mild flattening the ventral cord surface at C5-6 and C6-7.  No definite cord signal abnormality.  She reports this has improved over time and she no longer has symptoms. She continues on mobic 15 mg daily and gabapentin 300 mg bid. She would like to stop gabapentin.      She lives with her  and feels safe at home. She does exercise regularly with running and weights.  She eats healthy.  No weight loss.  No falls or balance issues.      Colonoscopy---10/2017 repeat in 5 years  Mammogram--11/2021  neg  DEXA-----2/2021 nl  Pap-----RUI  Tdap---3/2014  Prevnar---none  Pneumovax---none   Influenza vaccine-----due  Shingrex----12/2019, 5/2020   Covid vaccine---UTD x 3 doses      Review of Systems   Constitutional: Negative for appetite change, fatigue, fever and unexpected weight change.   HENT: Negative for congestion, ear pain, hearing loss, sore throat and trouble swallowing.    Eyes: Negative for pain and visual disturbance.   Respiratory: Negative for cough, chest tightness, shortness of breath and wheezing.    Cardiovascular: Negative for chest pain, palpitations and leg swelling.   Gastrointestinal: Positive for constipation and diarrhea. Negative for abdominal pain, blood in stool, nausea and vomiting.   Endocrine: Negative for polyuria.   Genitourinary: Negative for dysuria and  "hematuria.   Musculoskeletal: Positive for arthralgias. Negative for back pain and myalgias.   Skin: Negative for rash.   Neurological: Negative for dizziness, weakness, numbness and headaches.   Hematological: Does not bruise/bleed easily.   Psychiatric/Behavioral: Positive for sleep disturbance. Negative for dysphoric mood and suicidal ideas. The patient is not nervous/anxious.        Objective:      Vitals:    01/27/22 0941   BP: 130/84   Pulse: 70   Resp: 12   Temp: 98.2 °F (36.8 °C)   TempSrc: Temporal   SpO2: 96%   Weight: 69 kg (152 lb 3.6 oz)   Height: 5' 9" (1.753 m)     Body mass index is 22.48 kg/m².  Physical Exam    General appearance: No acute distress, cooperative  Eyes: PERRL, EOMI, conjunctiva clear  Ears: normal external ear and pinna, tm clear without drainage, canals clear  Nose: Normal mucosa without drainage  Throat: no exudates or erythema, tonsils not enlarged  Mouth: no sores or lesions, moist mucous membranes  Neck: FROM, soft, supple, no thyromegaly, no bruits  Lymph: no anterior or posterior cervical adenopathy  Heart::  Regular rate and rhythm, no murmur  Lung: Clear to ascultation bilaterally, no wheezing, no rales, no rhonchi, no distress  Abdomen: Soft, nontender, no distention, no hepatosplenomegaly, bowel sounds normal, no guarding, no rebound, no peritoneal signs  Skin: no rashes, no lesions  Extremities: no edema, no cyanosis  Neuro: CN 2-12 intact, 5/5 muscle strength upper and lower extremity bilaterally, 2+ DTRs UE and LE bilaterally, normal gait  Peripheral pulses: 2+ pedal pulses bilaterally, good perfusion and color  Musculoskeletal: FROM, good strenth, no tenderness  Joint: normal appearance, no swelling, no warmth, no deformity in all joints    Assessment:       1. Well adult exam    2. Hypothyroidism, unspecified type    3. Multinodular goiter    4. Hyperlipidemia, unspecified hyperlipidemia type    5. Major depressive disorder, recurrent, mild    6. Primary insomnia  "   7. Hormone replacement therapy (HRT)    8. Osteoarthritis of spine with radiculopathy, cervical region        Plan:       Well adult exam   She is due for labs. She is UTD with her mammogram and colonoscopy.  She had a DEXA that was normal on 2/2021.  She needs her prevnar and pneumovax vaccines but she just had her covid booster. She will f/u in 2 months for prevnar.   -     CBC Auto Differential; Future; Expected date: 01/27/2022  -     Comprehensive Metabolic Panel; Future; Expected date: 01/27/2022  -     Lipid Panel; Future; Expected date: 01/27/2022  -     TSH; Future; Expected date: 01/27/2022    Hypothyroidism, unspecified type  Due to recheck TSH on this dose of levothyroxine.    Multinodular goiter  Last u/s was 2019.  Consider rechecking next year.     Hyperlipidemia, unspecified hyperlipidemia type  Due to recheck lipids.    Major depressive disorder, recurrent, mild  Good control on wellbutrin.     Primary insomnia  Concern about her dose of unisom and her rebound insomnia. Will decrease dose to 50 mg qhs and start remeron 7.5 mg. After one week then stop unisom and continue remeron. If she still struggles with sleep then will increase her dose of remeron. Referral to sleep medicine.   -     Ambulatory referral/consult to Sleep Disorders; Future; Expected date: 02/03/2022  -     mirtazapine (REMERON) 7.5 MG Tab; Take 1 tablet (7.5 mg total) by mouth every evening.  Dispense: 30 tablet; Refill: 11    Hormone replacement therapy (HRT)  She continues to follow with gyn and has annual mammogram.s    Osteoarthritis of spine with radiculopathy, cervical region  Resolved and okay to wean off gabapentin. Change to 300 mg qhs for one week then stop.     Follow up in about 2 months (around 3/27/2022) for nurse visit for prevnar.

## 2022-01-27 NOTE — PATIENT INSTRUCTIONS
Stop gabapentin 300 mg in am and then in 10 days stop nightly dose.     Then take only 1 unisom nighly and start remeron 7.5 mg nightly.    Then after a week stop the second unisom.

## 2022-02-08 ENCOUNTER — PATIENT MESSAGE (OUTPATIENT)
Dept: FAMILY MEDICINE | Facility: CLINIC | Age: 67
End: 2022-02-08
Payer: COMMERCIAL

## 2022-02-08 DIAGNOSIS — M47.22 OSTEOARTHRITIS OF SPINE WITH RADICULOPATHY, CERVICAL REGION: ICD-10-CM

## 2022-02-08 RX ORDER — MELOXICAM 7.5 MG/1
15 TABLET ORAL DAILY
Qty: 180 TABLET | Refills: 3 | Status: SHIPPED | OUTPATIENT
Start: 2022-02-08 | End: 2023-02-10

## 2022-02-08 RX ORDER — GABAPENTIN 300 MG/1
300 CAPSULE ORAL 2 TIMES DAILY
Qty: 180 CAPSULE | Refills: 2 | Status: SHIPPED | OUTPATIENT
Start: 2022-02-08 | End: 2022-11-09

## 2022-02-21 ENCOUNTER — CLINICAL SUPPORT (OUTPATIENT)
Dept: FAMILY MEDICINE | Facility: CLINIC | Age: 67
End: 2022-02-21
Payer: COMMERCIAL

## 2022-02-21 DIAGNOSIS — Z23 IMMUNIZATION DUE: Primary | ICD-10-CM

## 2022-02-21 PROCEDURE — 90694 VACC AIIV4 NO PRSRV 0.5ML IM: CPT | Mod: S$GLB,,, | Performed by: INTERNAL MEDICINE

## 2022-02-21 PROCEDURE — 90471 IMMUNIZATION ADMIN: CPT | Mod: S$GLB,,, | Performed by: INTERNAL MEDICINE

## 2022-02-21 PROCEDURE — 90694 FLU VACCINE - QUADRIVALENT - ADJUVANTED: ICD-10-PCS | Mod: S$GLB,,, | Performed by: INTERNAL MEDICINE

## 2022-02-21 PROCEDURE — 90471 FLU VACCINE - QUADRIVALENT - ADJUVANTED: ICD-10-PCS | Mod: S$GLB,,, | Performed by: INTERNAL MEDICINE

## 2022-03-09 ENCOUNTER — TELEPHONE (OUTPATIENT)
Dept: FAMILY MEDICINE | Facility: CLINIC | Age: 67
End: 2022-03-09
Payer: COMMERCIAL

## 2022-03-09 DIAGNOSIS — Z23 IMMUNIZATION DUE: Primary | ICD-10-CM

## 2022-03-30 ENCOUNTER — CLINICAL SUPPORT (OUTPATIENT)
Dept: FAMILY MEDICINE | Facility: CLINIC | Age: 67
End: 2022-03-30
Payer: COMMERCIAL

## 2022-03-30 DIAGNOSIS — Z23 IMMUNIZATION DUE: ICD-10-CM

## 2022-03-30 PROCEDURE — 90471 IMMUNIZATION ADMIN: CPT | Mod: S$GLB,,, | Performed by: INTERNAL MEDICINE

## 2022-03-30 PROCEDURE — 90670 PNEUMOCOCCAL CONJUGATE VACCINE 13-VALENT LESS THAN 5YO & GREATER THAN: ICD-10-PCS | Mod: S$GLB,,, | Performed by: INTERNAL MEDICINE

## 2022-03-30 PROCEDURE — 90471 PNEUMOCOCCAL CONJUGATE VACCINE 13-VALENT LESS THAN 5YO & GREATER THAN: ICD-10-PCS | Mod: S$GLB,,, | Performed by: INTERNAL MEDICINE

## 2022-03-30 PROCEDURE — 90670 PCV13 VACCINE IM: CPT | Mod: S$GLB,,, | Performed by: INTERNAL MEDICINE

## 2022-05-10 DIAGNOSIS — F33.0 MAJOR DEPRESSIVE DISORDER, RECURRENT, MILD: ICD-10-CM

## 2022-05-10 RX ORDER — BUPROPION HYDROCHLORIDE 300 MG/1
TABLET ORAL
Qty: 90 TABLET | Refills: 2 | Status: SHIPPED | OUTPATIENT
Start: 2022-05-10 | End: 2023-02-10

## 2022-05-10 NOTE — TELEPHONE ENCOUNTER
Refill Authorization Note   Katerina Dickson  is requesting a refill authorization.  Brief Assessment and Rationale for Refill:  Approve     Medication Therapy Plan:       Medication Reconciliation Completed: No   Comments:     No Care Gaps recommended.     Note composed:3:17 PM 05/10/2022

## 2022-05-10 NOTE — TELEPHONE ENCOUNTER
No new care gaps identified.  [facility] Embedded Care Gaps. Reference number: [id number]. 5/10/2022   9:54:22 AM CDT

## 2022-06-28 ENCOUNTER — PATIENT MESSAGE (OUTPATIENT)
Dept: FAMILY MEDICINE | Facility: CLINIC | Age: 67
End: 2022-06-28

## 2022-06-28 ENCOUNTER — OFFICE VISIT (OUTPATIENT)
Dept: FAMILY MEDICINE | Facility: CLINIC | Age: 67
End: 2022-06-28
Payer: COMMERCIAL

## 2022-06-28 ENCOUNTER — TELEPHONE (OUTPATIENT)
Dept: FAMILY MEDICINE | Facility: CLINIC | Age: 67
End: 2022-06-28

## 2022-06-28 DIAGNOSIS — H10.023 OTHER MUCOPURULENT CONJUNCTIVITIS OF BOTH EYES: Primary | ICD-10-CM

## 2022-06-28 PROCEDURE — 99213 OFFICE O/P EST LOW 20 MIN: CPT | Mod: S$GLB,,, | Performed by: NURSE PRACTITIONER

## 2022-06-28 PROCEDURE — 99213 PR OFFICE/OUTPT VISIT, EST, LEVL III, 20-29 MIN: ICD-10-PCS | Mod: S$GLB,,, | Performed by: NURSE PRACTITIONER

## 2022-06-28 RX ORDER — OFLOXACIN 3 MG/ML
1 SOLUTION/ DROPS OPHTHALMIC 4 TIMES DAILY
Qty: 5 ML | Refills: 0 | Status: SHIPPED | OUTPATIENT
Start: 2022-06-28 | End: 2022-07-03

## 2022-06-28 NOTE — PROGRESS NOTES
Katerina Dickson is a 66 y.o. female patient of Kaitlyn Butler DO who presents to the clinic today for an eye complaint.    HPI    Pt, who is new to me, reports a New problem to me:        both eyes discharge, erythema and crusted shut in the morning.     No other symptoms.        These symptoms began 3 days ago and status is worsening.     Pt denies the following symptoms:  pain in the eye allergies, contact lens use and other family members with similar symptoms   pruritus, decreased vision, photophobia, pain    Aggravating factors include none    Relieving factors include none tried.    OTC Medications tried are above.    Prescription medications taken for symptoms are above.    Pertinent medical history: does not wear contact, has not had contact with another who has conjunctivitis, does not have glaucoma--using drops.    ROS    Constitutional:  negative fever, is not feeling ill.    HEENT:  hoarseness                  Denies   ear drainage, earaches, nasal congestion and sore throat                 All other ROS neg.     Past Medical History:   Diagnosis Date    BRCA1 negative     BRCA2 negative     GERD (gastroesophageal reflux disease)     Hashimoto's thyroiditis 6/07    Hypothyroidism     Pancreatitis 1988,1995    Postmenopausal HRT (hormone replacement therapy)        Current Outpatient Medications:     aspirin 81 MG Chew, Take 81 mg by mouth once daily., Disp: , Rfl:     BIOTIN ORAL, Take by mouth Daily., Disp: , Rfl:     buPROPion (WELLBUTRIN XL) 300 MG 24 hr tablet, TAKE 1 TABLET BY MOUTH EVERY DAY, Disp: 90 tablet, Rfl: 2    DOXYLAMINE SUCCINATE (UNISOM ORAL), Take 100 mg by mouth every evening. 2 tablets nightly, Disp: , Rfl:     estradioL (VIVELLE-DOT) 0.075 mg/24 hr, APPLY 1 PATCH EXTERNALLY TO THE SKIN 2 TIMES A WEEK, Disp: 24 patch, Rfl: 3    gabapentin (NEURONTIN) 300 MG capsule, Take 1 capsule (300 mg total) by mouth 2 (two) times a day., Disp: 180 capsule, Rfl: 2    levothyroxine  (SYNTHROID) 100 MCG tablet, TAKE 1 TABLET(100 MCG) BY MOUTH EVERY DAY, Disp: 90 tablet, Rfl: 3    MAGNESIUM ORAL, Take by mouth Daily., Disp: , Rfl:     meloxicam (MOBIC) 7.5 MG tablet, Take 2 tablets (15 mg total) by mouth once daily., Disp: 180 tablet, Rfl: 3    mirtazapine (REMERON) 7.5 MG Tab, Take 1 tablet (7.5 mg total) by mouth every evening., Disp: 30 tablet, Rfl: 11     Pt is Patient has never been a smoker.    PHYSICAL EXAM    Alert, coop 66 y.o. female patient in no apparent distress ,is not ill-appearing.    There were no vitals filed for this visit.  Pt seen in the car.    Head:  Normocephalic, atraumatic.    Eyes:  conjunctivitis with erythema, discharge and matting noted             pupils equal and reactive, extraocular eye movements intact, left eye normal, right eye normal.  Note:  External eye visualized and not thoroughly examined.      ENT:  Ext ears normal in appearance.             Nose with no discharge.             Sinuses and lymph nodes not examined.             Resp:  Respirations even unlabored.     CV:  Color good.    MS:  Moves extremities and head smoothly and symmetrically.           NEURO:  Alert and oriented x 4.  Responds appropriately during interaction.                  Muscle tone:  Appears normal    Skin:  Warm, dry, color good.     Psych: Relaxed.  Well-groomed.        Pt today presents with report of an eye complaint today.  Additionally, the history and physical exam are consistent with conjunctivitis both eyes.  She has no nasal symptoms, post nasal drip, known contact.     Referred to:  Optometry, if eye treatment doesn't improve symptoms within 3 days.    Pt advised to perform comfort measures recommended on patient instruction sheet, which were reviewed at the time of the visit..    If not better in 3 days, the patient is advised to call her eye doctor to go in for an  in-person/follow up evaluation.  If worse or concerns, the patient is advised to call for an  optometry/ophthamology appointment or call [facility] or go to the nearest URGENT CARE or ER.  Explained exam findings, diagnosis and treatment plan to patient.  Questions answered and patient states understanding.

## 2022-06-28 NOTE — TELEPHONE ENCOUNTER
Patient in today for visit with complaints of eye infection and sore throat.  Due to increase in cases, patient was asked to wait in her vehicle and we could swab her to rule out covid.  Patient advised that she would not be willing to have a covid test done.  Patient was then asked to wait in her vehicle and we could change her appointment to a virtual visit.  Unable to reach patient by phone and unable to locate her in the parking lot.  Patient's co-pay was reimbursed when appointment was going to be changed to virtual.  Message sent via portal and [facility] with call back number.

## 2022-10-25 ENCOUNTER — TELEPHONE (OUTPATIENT)
Dept: OBSTETRICS AND GYNECOLOGY | Facility: CLINIC | Age: 67
End: 2022-10-25
Payer: COMMERCIAL

## 2022-10-25 DIAGNOSIS — Z12.31 VISIT FOR SCREENING MAMMOGRAM: Primary | ICD-10-CM

## 2022-11-06 DIAGNOSIS — E03.9 HYPOTHYROIDISM, UNSPECIFIED TYPE: ICD-10-CM

## 2022-11-06 NOTE — TELEPHONE ENCOUNTER
Care Due:                  Date            Visit Type   Department     Provider  --------------------------------------------------------------------------------                                EP -                              PRIMARY      [facility]  Last Visit: 01-      CARE ([facility])   MEDICINE       Kaitlyn Butler  Next Visit: None Scheduled  None         None Found                                                            Last  Test          Frequency    Reason                     Performed    Due Date  --------------------------------------------------------------------------------    Office Visit  12 months..  buPROPion, mirtazapine...  01- 01-    CBC.........  12 months..  mirtazapine..............  01- 01-    CMP.........  12 months..  mirtazapine..............  01- 01-    Lipid Panel.  12 months..  mirtazapine..............  01- 01-    [facility] Embedded Care Gaps. Reference number: [id number]. 11/06/2022   8:35:49 AM CST

## 2022-11-07 RX ORDER — LEVOTHYROXINE SODIUM 100 UG/1
TABLET ORAL
Qty: 90 TABLET | Refills: 0 | Status: SHIPPED | OUTPATIENT
Start: 2022-11-07 | End: 2023-02-10

## 2022-11-07 NOTE — TELEPHONE ENCOUNTER
Refill Authorization Note   Katerina Dickson  is requesting a refill authorization.  Brief Assessment and Rationale for Refill:  Approve    -Medication-Related Problems Identified:   Requires labs  Requires appointment  Medication Therapy Plan:  MRM resolved    Medication Reconciliation Completed: No   Comments:     Provider Staff:     Action is required for this patient.   Please see care gap opportunities below in Care Due Message.     Thanks!  [facility]     Appointments      Date Provider   Last Visit   1/27/2022 Kaitlyn Butler DO   Next Visit   Visit date not found Kaitlyn Butler DO     Note composed:7:33 AM 11/07/2022           Note composed:7:33 AM 11/07/2022

## 2022-11-14 ENCOUNTER — HOSPITAL ENCOUNTER (OUTPATIENT)
Dept: RADIOLOGY | Facility: HOSPITAL | Age: 67
Discharge: HOME | End: 2022-11-14
Attending: OBSTETRICS & GYNECOLOGY
Payer: COMMERCIAL

## 2022-11-14 DIAGNOSIS — Z12.31 VISIT FOR SCREENING MAMMOGRAM: ICD-10-CM

## 2022-11-14 PROCEDURE — 77067 SCR MAMMO BI INCL CAD: CPT | Mod: 26,,, | Performed by: RADIOLOGY

## 2022-11-14 PROCEDURE — 77063 BREAST TOMOSYNTHESIS BI: CPT | Mod: TC,PN

## 2022-11-14 PROCEDURE — 77067 SCR MAMMO BI INCL CAD: CPT | Mod: TC,PN

## 2022-11-14 PROCEDURE — 77063 MAMMO DIGITAL SCREENING BILAT WITH TOMO: ICD-10-PCS | Mod: 26,,, | Performed by: RADIOLOGY

## 2022-11-14 PROCEDURE — 77067 MAMMO DIGITAL SCREENING BILAT WITH TOMO: ICD-10-PCS | Mod: 26,,, | Performed by: RADIOLOGY

## 2022-11-14 PROCEDURE — 77063 BREAST TOMOSYNTHESIS BI: CPT | Mod: 26,,, | Performed by: RADIOLOGY

## 2023-01-11 ENCOUNTER — TELEPHONE (OUTPATIENT)
Dept: FAMILY MEDICINE | Facility: CLINIC | Age: 68
End: 2023-01-11
Payer: COMMERCIAL

## 2023-01-11 NOTE — TELEPHONE ENCOUNTER
----- Message from Liz Rhodes sent at 1/11/2023  3:00 PM CST -----  Contact: self  Type: Sooner Appointment Request        Caller is requesting a sooner appointment. Caller declined first available appointment listed below. Caller will not accept being placed on the waitlist and is requesting a message be sent to doctor.        Name of Caller: Patient   When is the first available appointment? 5/5/2023  Best Call Back Number: [phone]  Additional Information: annual f/u visit also needs to go over current medications. Plz call pt to get scheduled for annual appt. Thanks

## 2023-01-12 ENCOUNTER — OFFICE VISIT (OUTPATIENT)
Dept: FAMILY MEDICINE | Facility: CLINIC | Age: 68
End: 2023-01-12
Payer: COMMERCIAL

## 2023-01-12 ENCOUNTER — HOSPITAL ENCOUNTER (OUTPATIENT)
Dept: RADIOLOGY | Facility: HOSPITAL | Age: 68
Discharge: HOME | End: 2023-01-12
Attending: INTERNAL MEDICINE
Payer: COMMERCIAL

## 2023-01-12 VITALS
HEART RATE: 80 BPM | DIASTOLIC BLOOD PRESSURE: 86 MMHG | WEIGHT: 150.56 LBS | OXYGEN SATURATION: 97 % | BODY MASS INDEX: 22.24 KG/M2 | SYSTOLIC BLOOD PRESSURE: 134 MMHG

## 2023-01-12 DIAGNOSIS — R10.32 LEFT LOWER QUADRANT PAIN: ICD-10-CM

## 2023-01-12 DIAGNOSIS — J01.40 ACUTE NON-RECURRENT PANSINUSITIS: ICD-10-CM

## 2023-01-12 DIAGNOSIS — R10.32 LEFT LOWER QUADRANT PAIN: Primary | ICD-10-CM

## 2023-01-12 PROCEDURE — 74176 CT ABDOMEN PELVIS WITHOUT CONTRAST: ICD-10-PCS | Mod: 26,,, | Performed by: RADIOLOGY

## 2023-01-12 PROCEDURE — A9698 NON-RAD CONTRAST MATERIALNOC: HCPCS | Mod: PO | Performed by: INTERNAL MEDICINE

## 2023-01-12 PROCEDURE — 99214 PR OFFICE/OUTPT VISIT, EST, LEVL IV, 30-39 MIN: ICD-10-PCS | Mod: S$GLB,,, | Performed by: INTERNAL MEDICINE

## 2023-01-12 PROCEDURE — 99999 PR PBB SHADOW E&M-EST. PATIENT-LVL III: CPT | Mod: PBBFAC,,, | Performed by: INTERNAL MEDICINE

## 2023-01-12 PROCEDURE — 74176 CT ABD & PELVIS W/O CONTRAST: CPT | Mod: 26,,, | Performed by: RADIOLOGY

## 2023-01-12 PROCEDURE — 99999 PR PBB SHADOW E&M-EST. PATIENT-LVL III: ICD-10-PCS | Mod: PBBFAC,,, | Performed by: INTERNAL MEDICINE

## 2023-01-12 PROCEDURE — 25500020 PHARM REV CODE 255: Mod: PO | Performed by: INTERNAL MEDICINE

## 2023-01-12 PROCEDURE — 99214 OFFICE O/P EST MOD 30 MIN: CPT | Mod: S$GLB,,, | Performed by: INTERNAL MEDICINE

## 2023-01-12 PROCEDURE — 74176 CT ABD & PELVIS W/O CONTRAST: CPT | Mod: TC,PO

## 2023-01-12 RX ADMIN — BARIUM SULFATE 900 ML: 20 SUSPENSION ORAL at 04:01

## 2023-01-12 NOTE — PROGRESS NOTES
Patient ID: Katerina Dickson     Chief Complaint:   Chief Complaint   Patient presents with    Abdominal Pain     Pain under rib, since Sunday, sharp stabbing pain         HPI:  Patient has 2 major complaints today.  One is a sinus infection for the last 4 months that she try to remedy with over-the-counter medicines without much success.  She has had consistent headaches and sinus pressure in feelings that her sinuses and nose are filling up when she bends over throughout this time.  Thankfully she has not had any fevers or chills or nausea vomiting.  Nor she had any cough or sore throat.  I think she could benefit from a course of antibiotics to help sterilize her sinuses.  The bigger complaint today is left upper quadrant abdominal pain that has been present for the past 4 days.  She describes the pain as sharp and stabbing that is much worse when she moves especially bends over or palpate that area.  She has had numerous bouts of pancreatitis in the past associated with some intra-abdominal surgeries which is slightly odd, but has not had any pancreatitis since her gallbladder was removed a few years ago.  Both she and I agree this is not feel like pancreatitis but I a.m. concerned about diverticulitis.  She hasn't had any changes in her Bowel movements. With that I would like to get a CT scan of her abdomen and pelvis today.    Review of Systems   Constitutional: Negative.    HENT:  Positive for congestion and sinus pain.    Eyes: Negative.    Respiratory: Negative.     Cardiovascular: Negative.    Gastrointestinal:  Positive for abdominal pain.   Endocrine: Negative.    Genitourinary: Negative.    Musculoskeletal: Negative.    Skin: Negative.    Allergic/Immunologic: Negative.    Neurological:  Positive for headaches.   Hematological: Negative.    Psychiatric/Behavioral: Negative.          Objective:      Physical Exam   Physical Exam  Vitals and nursing note reviewed.   Constitutional:       Appearance: Normal  appearance. She is well-developed.   HENT:      Head: Normocephalic and atraumatic.      Nose: Nose normal.   Eyes:      Extraocular Movements: Extraocular movements intact.      Conjunctiva/sclera: Conjunctivae normal.      Pupils: Pupils are equal, round, and reactive to light.   Cardiovascular:      Rate and Rhythm: Normal rate and regular rhythm.      Pulses: Normal pulses.      Heart sounds: Normal heart sounds.   Pulmonary:      Effort: Pulmonary effort is normal.      Breath sounds: Normal breath sounds.   Abdominal:      General: Bowel sounds are normal.      Tenderness: There is abdominal tenderness. There is guarding.      Comments: + referred pain    Musculoskeletal:         General: Normal range of motion.      Cervical back: Normal range of motion and neck supple.   Skin:     General: Skin is warm and dry.      Capillary Refill: Capillary refill takes less than 2 seconds.   Neurological:      General: No focal deficit present.      Mental Status: She is alert and oriented to person, place, and time.   Psychiatric:         Mood and Affect: Mood normal.         Behavior: Behavior normal.         Thought Content: Thought content normal.         Judgment: Judgment normal.          Vitals:   Vitals:    01/12/23 1321   BP: 134/86   Patient Position: Sitting   Pulse: 80   SpO2: 97%   Weight: 68.3 kg (150 lb 9.2 oz)          Current Outpatient Medications:     aspirin 81 MG Chew, Take 81 mg by mouth once daily., Disp: , Rfl:     BIOTIN ORAL, Take by mouth Daily., Disp: , Rfl:     buPROPion (WELLBUTRIN XL) 300 MG 24 hr tablet, TAKE 1 TABLET BY MOUTH EVERY DAY, Disp: 90 tablet, Rfl: 2    DOXYLAMINE SUCCINATE (UNISOM ORAL), Take 100 mg by mouth every evening. 2 tablets nightly, Disp: , Rfl:     estradioL (VIVELLE-DOT) 0.075 mg/24 hr, APPLY 1 PATCH EXTERNALLY TO THE SKIN 2 TIMES A WEEK, Disp: 24 patch, Rfl: 3    gabapentin (NEURONTIN) 300 MG capsule, TAKE 1 CAPSULE(300 MG) BY MOUTH TWICE DAILY, Disp: 180 capsule,  Rfl: 2    levothyroxine (SYNTHROID) 100 MCG tablet, TAKE 1 TABLET(100 MCG) BY MOUTH EVERY DAY, Disp: 90 tablet, Rfl: 0    meloxicam (MOBIC) 7.5 MG tablet, Take 2 tablets (15 mg total) by mouth once daily., Disp: 180 tablet, Rfl: 3    MAGNESIUM ORAL, Take by mouth Daily., Disp: , Rfl:     mirtazapine (REMERON) 7.5 MG Tab, Take 1 tablet (7.5 mg total) by mouth every evening., Disp: 30 tablet, Rfl: 11   Assessment:       Patient Active Problem List    Diagnosis Date Noted    Primary osteoarthritis of right knee 06/11/2019    Patellofemoral dysfunction, right 06/11/2019    Colon cancer screening 10/23/2017    YO (dyspnea on exertion) 05/15/2017    Major depressive disorder, recurrent, mild 04/25/2017    Primary insomnia 04/25/2017    Multinodular goiter 04/25/2017    Hyperlipidemia 04/25/2017    DDD (degenerative disc disease), cervical 09/21/2016    Cervical radiculopathy 09/21/2016    Neck pain 08/26/2016    GERD (gastroesophageal reflux disease) 02/25/2014    Hypothyroidism 02/25/2014          Plan:       Katerina CATHIE Bucioz  was seen today for follow-up and may need lab work.    Diagnoses and all orders for this visit:    Katerina was seen today for abdominal pain.    Diagnoses and all orders for this visit:    Left lower quadrant pain  -     CT Abdomen Pelvis  Without Contrast; Future    Acute non-recurrent pansinusitis  Consider antibiotics

## 2023-01-13 ENCOUNTER — PATIENT MESSAGE (OUTPATIENT)
Dept: FAMILY MEDICINE | Facility: CLINIC | Age: 68
End: 2023-01-13
Payer: COMMERCIAL

## 2023-01-13 DIAGNOSIS — J18.9 PNEUMONIA OF LEFT LOWER LOBE DUE TO INFECTIOUS ORGANISM: Primary | ICD-10-CM

## 2023-01-13 RX ORDER — MOXIFLOXACIN HYDROCHLORIDE 400 MG/1
400 TABLET ORAL DAILY
Qty: 7 TABLET | Refills: 0 | Status: SHIPPED | OUTPATIENT
Start: 2023-01-13 | End: 2023-01-20

## 2023-02-28 ENCOUNTER — OFFICE VISIT (OUTPATIENT)
Dept: FAMILY MEDICINE | Facility: CLINIC | Age: 68
End: 2023-02-28
Payer: COMMERCIAL

## 2023-02-28 VITALS
WEIGHT: 147.06 LBS | TEMPERATURE: 98 F | HEIGHT: 69 IN | OXYGEN SATURATION: 95 % | DIASTOLIC BLOOD PRESSURE: 76 MMHG | BODY MASS INDEX: 21.78 KG/M2 | HEART RATE: 72 BPM | SYSTOLIC BLOOD PRESSURE: 134 MMHG | RESPIRATION RATE: 20 BRPM

## 2023-02-28 DIAGNOSIS — Z78.0 ASYMPTOMATIC MENOPAUSAL STATE: ICD-10-CM

## 2023-02-28 DIAGNOSIS — E04.2 MULTINODULAR GOITER: ICD-10-CM

## 2023-02-28 DIAGNOSIS — E78.5 HYPERLIPIDEMIA, UNSPECIFIED HYPERLIPIDEMIA TYPE: ICD-10-CM

## 2023-02-28 DIAGNOSIS — Z79.890 HORMONE REPLACEMENT THERAPY (HRT): ICD-10-CM

## 2023-02-28 DIAGNOSIS — Z00.00 WELL ADULT EXAM: Primary | ICD-10-CM

## 2023-02-28 DIAGNOSIS — Z12.31 ENCOUNTER FOR SCREENING MAMMOGRAM FOR MALIGNANT NEOPLASM OF BREAST: ICD-10-CM

## 2023-02-28 DIAGNOSIS — F51.01 PRIMARY INSOMNIA: ICD-10-CM

## 2023-02-28 DIAGNOSIS — M47.22 OSTEOARTHRITIS OF SPINE WITH RADICULOPATHY, CERVICAL REGION: ICD-10-CM

## 2023-02-28 DIAGNOSIS — F33.0 MAJOR DEPRESSIVE DISORDER, RECURRENT, MILD: ICD-10-CM

## 2023-02-28 DIAGNOSIS — Z23 NEED FOR PROPHYLACTIC VACCINATION AGAINST STREPTOCOCCUS PNEUMONIAE (PNEUMOCOCCUS): ICD-10-CM

## 2023-02-28 DIAGNOSIS — E03.9 HYPOTHYROIDISM, UNSPECIFIED TYPE: ICD-10-CM

## 2023-02-28 DIAGNOSIS — Z12.11 SCREEN FOR COLON CANCER: ICD-10-CM

## 2023-02-28 PROCEDURE — 99397 PR PREVENTIVE VISIT,EST,65 & OVER: ICD-10-PCS | Mod: 25,S$GLB,, | Performed by: INTERNAL MEDICINE

## 2023-02-28 PROCEDURE — 90677 PNEUMOCOCCAL CONJUGATE VACCINE 20-VALENT: ICD-10-PCS | Mod: S$GLB,,, | Performed by: INTERNAL MEDICINE

## 2023-02-28 PROCEDURE — 90677 PCV20 VACCINE IM: CPT | Mod: S$GLB,,, | Performed by: INTERNAL MEDICINE

## 2023-02-28 PROCEDURE — 90471 IMMUNIZATION ADMIN: CPT | Mod: S$GLB,,, | Performed by: INTERNAL MEDICINE

## 2023-02-28 PROCEDURE — 80061 LIPID PANEL: CPT | Performed by: INTERNAL MEDICINE

## 2023-02-28 PROCEDURE — 36415 PR COLLECTION VENOUS BLOOD,VENIPUNCTURE: ICD-10-PCS | Mod: S$GLB,,, | Performed by: INTERNAL MEDICINE

## 2023-02-28 PROCEDURE — 36415 COLL VENOUS BLD VENIPUNCTURE: CPT | Mod: S$GLB,,, | Performed by: INTERNAL MEDICINE

## 2023-02-28 PROCEDURE — 80053 COMPREHEN METABOLIC PANEL: CPT | Performed by: INTERNAL MEDICINE

## 2023-02-28 PROCEDURE — 90471 PNEUMOCOCCAL CONJUGATE VACCINE 20-VALENT: ICD-10-PCS | Mod: S$GLB,,, | Performed by: INTERNAL MEDICINE

## 2023-02-28 PROCEDURE — 84443 ASSAY THYROID STIM HORMONE: CPT | Performed by: INTERNAL MEDICINE

## 2023-02-28 PROCEDURE — 99397 PER PM REEVAL EST PAT 65+ YR: CPT | Mod: 25,S$GLB,, | Performed by: INTERNAL MEDICINE

## 2023-02-28 NOTE — PROGRESS NOTES
Venipuncture performed with 23 gauge butterfly, x's 1 attempt,  to R Antecubital vein.  Specimens collected per orders.      Pressure dressing applied to site, instructed patient to remove dressing in 10-15 minutes, OK to re-adjust dressing if pressure causing any discomfort, to observe closely for numbness and/or discoloration to hand or fingers, and to notify provider if bleeding persists after applying constant pressure lasting 30 minutes.

## 2023-02-28 NOTE — PROGRESS NOTES
Subjective:       Patient ID: Katerina Dickson is a 67 y.o. female.    Medication List with Changes/Refills   Current Medications    ASPIRIN 81 MG CHEW    Take 81 mg by mouth once daily.    BIOTIN ORAL    Take by mouth Daily.    BUPROPION (WELLBUTRIN XL) 300 MG 24 HR TABLET    TAKE 1 TABLET BY MOUTH EVERY DAY    DOXYLAMINE SUCCINATE (UNISOM ORAL)    Take 100 mg by mouth every evening. 2 tablets nightly    ESTRADIOL (VIVELLE-DOT) 0.075 MG/24 HR    APPLY 1 PATCH TOPICALLY TO THE SKIN 2 TIMES A WEEK    GABAPENTIN (NEURONTIN) 300 MG CAPSULE    TAKE 1 CAPSULE(300 MG) BY MOUTH TWICE DAILY    LEVOTHYROXINE (SYNTHROID) 100 MCG TABLET    TAKE 1 TABLET(100 MCG) BY MOUTH EVERY DAY    MELOXICAM (MOBIC) 7.5 MG TABLET    TAKE 2 TABLETS(15 MG) BY MOUTH EVERY DAY       Chief Complaint: Annual Exam  She is here today to f/u on chronic medical issues.     She was seen on 1/2023 with pain under her left rib that hurt with deep breathing.  CT of the abdomen was negative but did show left lower lobe atelectasis. She was given antibiotics but she feels it took 2 weeks and then the pain improved. It has not recurred. No shortness of breath. No wheezing. No coughing.      She has depression that has been controlled since 2005 on wellbutrin 300 mg qday. She feels this is helping with her symptoms. She denies anxiety. She denies suicidal ideations.     She has insomnia for many years and is now using unisom 100 mg qhs to sleep. She was seen by sleep medicine who advised to continue unisom and limit melatonin to less than 5 mg nightly.      She has hypothyroid and is taking synthroid 100 mcg qday. Her last TSH was normal on 1/2022.  She had a thyroid u/s done on 5/2019 that was unchanged from previous year and no nodules that met criteria for bx.      She has mild hyperlipidemia and is not on treatment. Last lipids on 1/22022 were 237/107/69/146.  She is careful with her diet.      She is taking HRT patches since her RUI in 1995. She is managed  by gyn.      She has GERD and is taking OTC prevacid 15 mg as needed. She feels this helps with her symptoms. She had her last EGD in 2016 that showed some gastric polyps but otherwise normal.        She was noted to have very low platelets that have now normalized. She was followed by hematology who feel that the platelets were artificially low due to clumping in the vial before processing. Repeat evaluation was normal.      She has fullness in her right axilla and tingling down her arm. She has EMG that showed right cervical radiculopathy.  MRI of the brachial plexus was reassuring and MRI of the cervical spine showed degenerative changes most pronounced at C5-6 and result in severe right and moderate left neural foraminal narrowing and moderate spinal canal stenosis. Mild flattening the ventral cord surface at C5-6 and C6-7.  No definite cord signal abnormality.  She reports this has improved over time and she no longer has symptoms. She continues on mobic 15 mg daily and gabapentin 300 mg bid. She would like to decrease her dose of gabapentin.       She lives with her  and feels safe at home. She does exercise regularly with running and weights.  She eats healthy.  No weight loss.  No falls or balance issues.      Colonoscopy---10/2017 repeat in 5 years  Mammogram--11/2022  neg  DEXA-----2/2021 nl  Pap-----RUI  Tdap---3/2014  Prevnar 13----3/2022  Prevnar 20----none   Pneumovax---none   Influenza vaccine-----2/2022  Shingrex----12/2019, 5/2020   Covid vaccine---3 doses       Review of Systems   Constitutional:  Negative for appetite change, fatigue, fever and unexpected weight change.   HENT:  Negative for congestion, ear pain, hearing loss, sore throat and trouble swallowing.    Eyes:  Negative for pain and visual disturbance.   Respiratory:  Negative for cough, chest tightness, shortness of breath and wheezing.    Cardiovascular:  Negative for chest pain, palpitations and leg swelling.  "  Gastrointestinal:  Negative for abdominal pain, blood in stool, constipation, diarrhea, nausea and vomiting.   Endocrine: Negative for polyuria.   Genitourinary:  Negative for dysuria and hematuria.   Musculoskeletal:  Negative for arthralgias, back pain and myalgias.   Skin:  Negative for rash.   Neurological:  Positive for headaches. Negative for dizziness, weakness and numbness.   Hematological:  Does not bruise/bleed easily.   Psychiatric/Behavioral:  Positive for sleep disturbance. Negative for dysphoric mood and suicidal ideas. The patient is not nervous/anxious.      Objective:      Vitals:    02/28/23 1027   BP: 134/76   Pulse: 72   Resp: 20   Temp: 98.2 °F (36.8 °C)   SpO2: 95%   Weight: 66.7 kg (147 lb 0.8 oz)   Height: 5' 9" (1.753 m)     Body mass index is 21.72 kg/m².  Physical Exam    General appearance: No acute distress, cooperative  Eyes: PERRL, EOMI, conjunctiva clear  Ears: normal external ear and pinna, tm clear without drainage, canals clear  Nose: Normal mucosa without drainage  Throat: no exudates or erythema, tonsils not enlarged  Mouth: no sores or lesions, moist mucous membranes  Neck: FROM, soft, supple, no thyromegaly, no bruits  Lymph: no anterior or posterior cervical adenopathy  Heart::  Regular rate and rhythm, no murmur  Lung: Clear to ascultation bilaterally, no wheezing, no rales, no rhonchi, no distress  Abdomen: Soft, nontender, no distention, no hepatosplenomegaly, bowel sounds normal, no guarding, no rebound, no peritoneal signs  Skin: no rashes, no lesions  Extremities: no edema, no cyanosis  Neuro: CN 2-12 intact, 5/5 muscle strength upper and lower extremity bilaterally, 2+ DTRs UE and LE bilaterally, normal gait  Peripheral pulses: 2+ pedal pulses bilaterally, good perfusion and color  Musculoskeletal: FROM, good strenth, no tenderness  Joint: normal appearance, no swelling, no warmth, no deformity in all joints    Assessment:       1. Well adult exam    2. " Hyperlipidemia, unspecified hyperlipidemia type    3. Hypothyroidism, unspecified type    4. Multinodular goiter    5. Major depressive disorder, recurrent, mild    6. Primary insomnia    7. Hormone replacement therapy (HRT)    8. Osteoarthritis of spine with radiculopathy, cervical region    9. Screen for colon cancer    10. Encounter for screening mammogram for malignant neoplasm of breast    11. Asymptomatic menopausal state    12. Need for prophylactic vaccination against Streptococcus pneumoniae (pneumococcus)        Plan:       Well adult exam  She is UTD on her mammogram and DEXA. She is due for colonoscopy. Given prevnar 20 today.     Hyperlipidemia, unspecified hyperlipidemia type  Mildly elevated but she is not on treatment. Due to recheck lipids.   -     Comprehensive Metabolic Panel  -     Lipid Panel    Hypothyroidism, unspecified type  Due to recheck thyroid studies on this dose of levothyroxine.   -     CBC Auto Differential  -     TSH    Multinodular goiter  Stable and no further imaging needed.     Major depressive disorder, recurrent, mild  Well controlled and continue current regimen.     Primary insomnia  She is doing well on unisom.     Hormone replacement therapy (HRT)  Stable and this is managed by gyn    Osteoarthritis of spine with radiculopathy, cervical region  Good control and no active symptoms. She will try to decrease gabapentin to 300 mg once a day and then if no recurrence of pain then will try to d/c gabapentin.     Screen for colon cancer  -     Case Request Endoscopy: COLONOSCOPY    Encounter for screening mammogram for malignant neoplasm of breast  -     Mammo Digital Screening Bilat w/ Bertin; Future; Expected date: 02/28/2023    Asymptomatic menopausal state  -     DXA Bone Density Axial Skeleton 1 or more sites; Future; Expected date: 02/28/2023    Need for prophylactic vaccination against Streptococcus pneumoniae (pneumococcus)  -     (In Office Administered) Pneumococcal  Conjugate Vaccine (20 Valent) (IM)    Follow up in about 1 year (around 2/28/2024) for annual exam.

## 2023-03-01 ENCOUNTER — PATIENT MESSAGE (OUTPATIENT)
Dept: FAMILY MEDICINE | Facility: CLINIC | Age: 68
End: 2023-03-01
Payer: COMMERCIAL

## 2023-03-01 LAB
ALBUMIN SERPL BCP-MCNC: 4.2 G/DL (ref 3.5–5.2)
ALP SERPL-CCNC: 52 U/L (ref 55–135)
ALT SERPL W/O P-5'-P-CCNC: 16 U/L (ref 10–44)
ANION GAP SERPL CALC-SCNC: 8 MMOL/L (ref 8–16)
AST SERPL-CCNC: 20 U/L (ref 10–40)
BILIRUB SERPL-MCNC: 0.6 MG/DL (ref 0.1–1)
BUN SERPL-MCNC: 19 MG/DL (ref 8–23)
CALCIUM SERPL-MCNC: 9.4 MG/DL (ref 8.7–10.5)
CHLORIDE SERPL-SCNC: 106 MMOL/L (ref 95–110)
CHOLEST SERPL-MCNC: 242 MG/DL (ref 120–199)
CHOLEST/HDLC SERPL: 3.5 {RATIO} (ref 2–5)
CO2 SERPL-SCNC: 26 MMOL/L (ref 23–29)
CREAT SERPL-MCNC: 0.9 MG/DL (ref 0.5–1.4)
EST. GFR  (NO RACE VARIABLE): >60 ML/MIN/1.73 M^2
GLUCOSE SERPL-MCNC: 93 MG/DL (ref 70–110)
HDLC SERPL-MCNC: 70 MG/DL (ref 40–75)
HDLC SERPL: 28.9 % (ref 20–50)
LDLC SERPL CALC-MCNC: 153.4 MG/DL (ref 63–159)
NONHDLC SERPL-MCNC: 172 MG/DL
POTASSIUM SERPL-SCNC: 4.8 MMOL/L (ref 3.5–5.1)
PROT SERPL-MCNC: 6.9 G/DL (ref 6–8.4)
SODIUM SERPL-SCNC: 140 MMOL/L (ref 136–145)
TRIGL SERPL-MCNC: 93 MG/DL (ref 30–150)
TSH SERPL DL<=0.005 MIU/L-ACNC: 1.84 UIU/ML (ref 0.4–4)

## 2023-03-02 ENCOUNTER — TELEPHONE (OUTPATIENT)
Dept: FAMILY MEDICINE | Facility: CLINIC | Age: 68
End: 2023-03-02
Payer: COMMERCIAL

## 2023-03-02 DIAGNOSIS — E03.9 HYPOTHYROIDISM, UNSPECIFIED TYPE: ICD-10-CM

## 2023-03-02 DIAGNOSIS — Z00.00 WELL ADULT EXAM: Primary | ICD-10-CM

## 2023-03-02 DIAGNOSIS — E78.5 HYPERLIPIDEMIA, UNSPECIFIED HYPERLIPIDEMIA TYPE: ICD-10-CM

## 2023-03-02 NOTE — TELEPHONE ENCOUNTER
CBC cancelled by lab due to specimen clotted.   Would you like pt to go to lab for redraw or can it be done at her next visit?

## 2023-03-02 NOTE — TELEPHONE ENCOUNTER
----- Message from Freddy Frye sent at 3/1/2023  9:43 PM CST -----  Regarding: Client Service  Contact: [phone]  Good Morning,     My name is Freddy Frye, I work in the Lab Client Services. We had a problem with some lab work on this patient. If someone from your office could call us at [phone] or [phone] that would be great. Anyone in my department can help.      Thank you,

## 2023-03-02 NOTE — TELEPHONE ENCOUNTER
This needs to be done in the lab due to clotting. Problem in the past. I am going to run it stat this time    Please schedule her     Thanks

## 2023-03-03 ENCOUNTER — LAB VISIT (OUTPATIENT)
Dept: LAB | Facility: HOSPITAL | Age: 68
End: 2023-03-03
Attending: INTERNAL MEDICINE
Payer: COMMERCIAL

## 2023-03-03 DIAGNOSIS — E03.9 HYPOTHYROIDISM, UNSPECIFIED TYPE: ICD-10-CM

## 2023-03-03 LAB
BASOPHILS # BLD AUTO: 0.04 K/UL (ref 0–0.2)
BASOPHILS NFR BLD: 0.8 % (ref 0–1.9)
DIFFERENTIAL METHOD: ABNORMAL
EOSINOPHIL # BLD AUTO: 0.1 K/UL (ref 0–0.5)
EOSINOPHIL NFR BLD: 2.5 % (ref 0–8)
ERYTHROCYTE [DISTWIDTH] IN BLOOD BY AUTOMATED COUNT: 13.3 % (ref 11.5–14.5)
HCT VFR BLD AUTO: 41.3 % (ref 37–48.5)
HGB BLD-MCNC: 13.4 G/DL (ref 12–16)
IMM GRANULOCYTES # BLD AUTO: 0.06 K/UL (ref 0–0.04)
IMM GRANULOCYTES NFR BLD AUTO: 1.2 % (ref 0–0.5)
LYMPHOCYTES # BLD AUTO: 1.7 K/UL (ref 1–4.8)
LYMPHOCYTES NFR BLD: 33.1 % (ref 18–48)
MCH RBC QN AUTO: 32 PG (ref 27–31)
MCHC RBC AUTO-ENTMCNC: 32.4 G/DL (ref 32–36)
MCV RBC AUTO: 99 FL (ref 82–98)
MONOCYTES # BLD AUTO: 0.5 K/UL (ref 0.3–1)
MONOCYTES NFR BLD: 10.3 % (ref 4–15)
NEUTROPHILS # BLD AUTO: 2.7 K/UL (ref 1.8–7.7)
NEUTROPHILS NFR BLD: 52.1 % (ref 38–73)
NRBC BLD-RTO: 0 /100 WBC
PLATELET # BLD AUTO: 171 K/UL (ref 150–450)
PMV BLD AUTO: 11 FL (ref 9.2–12.9)
RBC # BLD AUTO: 4.19 M/UL (ref 4–5.4)
WBC # BLD AUTO: 5.14 K/UL (ref 3.9–12.7)

## 2023-03-03 PROCEDURE — 85025 COMPLETE CBC W/AUTO DIFF WBC: CPT | Mod: PO | Performed by: INTERNAL MEDICINE

## 2023-03-03 PROCEDURE — 36415 COLL VENOUS BLD VENIPUNCTURE: CPT | Mod: PO | Performed by: INTERNAL MEDICINE

## 2023-03-06 ENCOUNTER — PATIENT MESSAGE (OUTPATIENT)
Dept: FAMILY MEDICINE | Facility: CLINIC | Age: 68
End: 2023-03-06
Payer: COMMERCIAL

## 2023-03-16 ENCOUNTER — PATIENT MESSAGE (OUTPATIENT)
Dept: FAMILY MEDICINE | Facility: CLINIC | Age: 68
End: 2023-03-16
Payer: COMMERCIAL

## 2023-03-16 ENCOUNTER — PATIENT MESSAGE (OUTPATIENT)
Dept: GASTROENTEROLOGY | Facility: CLINIC | Age: 68
End: 2023-03-16
Payer: COMMERCIAL

## 2023-03-24 ENCOUNTER — PATIENT MESSAGE (OUTPATIENT)
Dept: FAMILY MEDICINE | Facility: CLINIC | Age: 68
End: 2023-03-24
Payer: COMMERCIAL

## 2023-03-24 ENCOUNTER — HOSPITAL ENCOUNTER (OUTPATIENT)
Dept: RADIOLOGY | Facility: HOSPITAL | Age: 68
Discharge: HOME | End: 2023-03-24
Attending: INTERNAL MEDICINE
Payer: COMMERCIAL

## 2023-03-24 DIAGNOSIS — Z78.0 ASYMPTOMATIC MENOPAUSAL STATE: ICD-10-CM

## 2023-03-24 PROCEDURE — 77080 DXA BONE DENSITY AXIAL: CPT | Mod: 26,,, | Performed by: RADIOLOGY

## 2023-03-24 PROCEDURE — 77080 DXA BONE DENSITY AXIAL SKELETON 1 OR MORE SITES: ICD-10-PCS | Mod: 26,,, | Performed by: RADIOLOGY

## 2023-03-24 PROCEDURE — 77080 DXA BONE DENSITY AXIAL: CPT | Mod: TC,PO

## 2023-05-10 ENCOUNTER — PATIENT MESSAGE (OUTPATIENT)
Dept: ENDOSCOPY | Facility: HOSPITAL | Age: 68
End: 2023-05-10
Payer: COMMERCIAL

## 2023-06-01 ENCOUNTER — TELEPHONE (OUTPATIENT)
Dept: ENDOSCOPY | Facility: HOSPITAL | Age: 68
End: 2023-06-01
Payer: COMMERCIAL

## 2023-06-01 NOTE — TELEPHONE ENCOUNTER
Hi during the preop call this patient reports she is taking meloxicam, pt states she has not received instructions for this medication for her procedure scheduled 06/06/23 and was wanting to be contacted directly by the clinic for further instruction. Thanks!!

## 2023-06-06 ENCOUNTER — ANESTHESIA (OUTPATIENT)
Dept: ENDOSCOPY | Facility: HOSPITAL | Age: 68
End: 2023-06-06
Payer: COMMERCIAL

## 2023-06-06 ENCOUNTER — ANESTHESIA EVENT (OUTPATIENT)
Dept: ENDOSCOPY | Facility: HOSPITAL | Age: 68
End: 2023-06-06
Payer: COMMERCIAL

## 2023-06-06 ENCOUNTER — HOSPITAL ENCOUNTER (OUTPATIENT)
Facility: HOSPITAL | Age: 68
Discharge: HOME | End: 2023-06-06
Attending: INTERNAL MEDICINE | Admitting: INTERNAL MEDICINE
Payer: COMMERCIAL

## 2023-06-06 VITALS
RESPIRATION RATE: 16 BRPM | TEMPERATURE: 98 F | SYSTOLIC BLOOD PRESSURE: 120 MMHG | WEIGHT: 145 LBS | OXYGEN SATURATION: 100 % | DIASTOLIC BLOOD PRESSURE: 63 MMHG | BODY MASS INDEX: 21.48 KG/M2 | HEART RATE: 65 BPM | HEIGHT: 69 IN

## 2023-06-06 DIAGNOSIS — Z86.010 HX OF COLONIC POLYPS: ICD-10-CM

## 2023-06-06 PROCEDURE — D9220A PRA ANESTHESIA: Mod: ANES,,, | Performed by: ANESTHESIOLOGY

## 2023-06-06 PROCEDURE — G0105 COLORECTAL SCRN; HI RISK IND: ICD-10-PCS | Mod: ,,, | Performed by: INTERNAL MEDICINE

## 2023-06-06 PROCEDURE — 37000009 HC ANESTHESIA EA ADD 15 MINS: Mod: PO | Performed by: INTERNAL MEDICINE

## 2023-06-06 PROCEDURE — D9220A PRA ANESTHESIA: Mod: CRNA,,, | Performed by: NURSE ANESTHETIST, CERTIFIED REGISTERED

## 2023-06-06 PROCEDURE — G0105 COLORECTAL SCRN; HI RISK IND: HCPCS | Mod: PO | Performed by: INTERNAL MEDICINE

## 2023-06-06 PROCEDURE — 37000008 HC ANESTHESIA 1ST 15 MINUTES: Mod: PO | Performed by: INTERNAL MEDICINE

## 2023-06-06 PROCEDURE — D9220A PRA ANESTHESIA: ICD-10-PCS | Mod: ANES,,, | Performed by: ANESTHESIOLOGY

## 2023-06-06 PROCEDURE — D9220A PRA ANESTHESIA: ICD-10-PCS | Mod: CRNA,,, | Performed by: NURSE ANESTHETIST, CERTIFIED REGISTERED

## 2023-06-06 PROCEDURE — 25000003 PHARM REV CODE 250: Mod: PO | Performed by: NURSE ANESTHETIST, CERTIFIED REGISTERED

## 2023-06-06 PROCEDURE — G0105 COLORECTAL SCRN; HI RISK IND: HCPCS | Mod: ,,, | Performed by: INTERNAL MEDICINE

## 2023-06-06 PROCEDURE — 63600175 PHARM REV CODE 636 W HCPCS: Mod: PO | Performed by: INTERNAL MEDICINE

## 2023-06-06 PROCEDURE — 63600175 PHARM REV CODE 636 W HCPCS: Mod: PO | Performed by: NURSE ANESTHETIST, CERTIFIED REGISTERED

## 2023-06-06 RX ORDER — SODIUM CHLORIDE 0.9 % (FLUSH) 0.9 %
10 SYRINGE (ML) INJECTION
Status: DISCONTINUED | OUTPATIENT
Start: 2023-06-06 | End: 2023-06-06 | Stop reason: HOSPADM

## 2023-06-06 RX ORDER — PROPOFOL 10 MG/ML
VIAL (ML) INTRAVENOUS
Status: DISCONTINUED | OUTPATIENT
Start: 2023-06-06 | End: 2023-06-06

## 2023-06-06 RX ORDER — LIDOCAINE HYDROCHLORIDE 20 MG/ML
INJECTION INTRAVENOUS
Status: DISCONTINUED | OUTPATIENT
Start: 2023-06-06 | End: 2023-06-06

## 2023-06-06 RX ORDER — SODIUM CHLORIDE, SODIUM LACTATE, POTASSIUM CHLORIDE, CALCIUM CHLORIDE 600; 310; 30; 20 MG/100ML; MG/100ML; MG/100ML; MG/100ML
INJECTION, SOLUTION INTRAVENOUS CONTINUOUS
Status: DISCONTINUED | OUTPATIENT
Start: 2023-06-06 | End: 2023-06-06 | Stop reason: HOSPADM

## 2023-06-06 RX ADMIN — PROPOFOL 50 MG: 10 INJECTION, EMULSION INTRAVENOUS at 09:06

## 2023-06-06 RX ADMIN — PROPOFOL 50 MG: 10 INJECTION, EMULSION INTRAVENOUS at 10:06

## 2023-06-06 RX ADMIN — PROPOFOL 120 MG: 10 INJECTION, EMULSION INTRAVENOUS at 09:06

## 2023-06-06 RX ADMIN — LIDOCAINE HYDROCHLORIDE 75 MG: 20 INJECTION INTRAVENOUS at 09:06

## 2023-06-06 RX ADMIN — SODIUM CHLORIDE, POTASSIUM CHLORIDE, SODIUM LACTATE AND CALCIUM CHLORIDE: 600; 310; 30; 20 INJECTION, SOLUTION INTRAVENOUS at 09:06

## 2023-06-06 RX ADMIN — PROPOFOL 40 MG: 10 INJECTION, EMULSION INTRAVENOUS at 09:06

## 2023-06-06 NOTE — PROVATION PATIENT INSTRUCTIONS
Discharge Summary/Instructions after an Endoscopic Procedure  Patient Name: Katerina Dickson  Patient MRN: [id number]  Patient YOB: 1955 Tuesday, June 6, 2023  Pankaj Shah MD  Dear patient,  As a result of recent federal legislation (The Federal Cures Act), you may   receive lab or pathology results from your procedure in your [facility]   account before your physician is able to contact you. Your physician or   their representative will relay the results to you with their   recommendations at their soonest availability.  Thank you,  RESTRICTIONS:  During your procedure today, you received medications for sedation.  These   medications may affect your judgment, balance and coordination.  Therefore,   for 24 hours, you have the following restrictions:   - DO NOT drive a car, operate machinery, make legal/financial decisions,   sign important papers or drink alcohol.    ACTIVITY:  Today: no heavy lifting, straining or running due to procedural   sedation/anesthesia.  The following day: return to full activity including work.  DIET:  Eat and drink normally unless instructed otherwise.     TREATMENT FOR COMMON SIDE EFFECTS:  - Mild abdominal pain, nausea, belching, bloating or excessive gas:  rest,   eat lightly and use a heating pad.  - Sore Throat: treat with throat lozenges and/or gargle with warm salt   water.  - Because air was used during the procedure, expelling large amounts of air   from your rectum or belching is normal.  - If a bowel prep was taken, you may not have a bowel movement for 1-3 days.    This is normal.  SYMPTOMS TO WATCH FOR AND REPORT TO YOUR PHYSICIAN:  1. Abdominal pain or bloating, other than gas cramps.  2. Chest pain.  3. Back pain.  4. Signs of infection such as: chills or fever occurring within 24 hours   after the procedure.  5. Rectal bleeding, which would show as bright red, maroon, or black stools.   (A tablespoon of blood from the rectum is not serious, especially if    hemorrhoids are present.)  6. Vomiting.  7. Weakness or dizziness.  GO DIRECTLY TO THE NEAREST EMERGENCY ROOM IF YOU HAVE ANY OF THE FOLLOWING:      Difficulty breathing              Chills and/or fever over 101 F   Persistent vomiting and/or vomiting blood   Severe abdominal pain   Severe chest pain   Black, tarry stools   Bleeding- more than one tablespoon   Any other symptom or condition that you feel may need urgent attention  Your doctor recommends these additional instructions:  If any biopsies were taken, your doctors clinic will contact you in 1 to 2   weeks with any results.  Your physician has recommended a repeat colonoscopy in five years for   surveillance.   You are being discharged to home.  For questions, problems or results please call your physician - Pankaj Shah MD at Work:  [phone].  EMERGENCY PHONE NUMBER: [phone], LAB RESULTS: [phone]  IF A COMPLICATION OR EMERGENCY SITUATION ARISES AND YOU ARE UNABLE TO REACH   YOUR PHYSICIAN - GO DIRECTLY TO THE EMERGENCY ROOM.  ___________________________________________  Nurse Signature  ___________________________________________  Patient/Designated Responsible Party Signature  Pankaj Shah MD  6/6/2023 10:12:23 AM  This report has been verified and signed electronically.  Dear patient,  As a result of recent federal legislation (The Federal Cures Act), you may   receive lab or pathology results from your procedure in your [facility]   account before your physician is able to contact you. Your physician or   their representative will relay the results to you with their   recommendations at their soonest availability.  Thank you.  PROVATION

## 2023-06-06 NOTE — ANESTHESIA PREPROCEDURE EVALUATION
06/06/2023  Katerina Dickson is a 67 y.o., female.    Pre-op Assessment    I have reviewed the Patient Summary Reports.    I have reviewed the Nursing Notes.    I have reviewed the Medications.     Review of Systems  Anesthesia Hx:  No problems with previous Anesthesia  Denies Family Hx of Anesthesia complications.   Denies Personal Hx of Anesthesia complications.   Social:  Alcohol Use Former smoker   Hematology/Oncology:        Hematology Comments: Thrombocytopenia, etiology unknown   Cardiovascular:   Exercise tolerance: good YO    Pulmonary:   Shortness of breath    Hepatic/GI:   Bowel Prep. GERD H/o pancreatitis   Musculoskeletal:   Arthritis     Neurological:   Denies Neuromuscular Disease.   Chronic Pain Syndrome   Endocrine:   Hypothyroidism    Psych:   anxiety depression          Physical Exam  General:  Well nourished      Airway/Jaw/Neck:  Airway Findings: Mouth Opening: Normal   Tongue: Normal   Mallampati: III Improves to II with phonation.  TM Distance: 4 - 6 cm   Jaw/Neck Findings:  Neck ROM: Normal ROM        Chest/Lungs:  Chest/Lungs Findings: Clear to auscultation, Normal Respiratory Rate      Heart/Vascular:  Heart Findings: Rate: Normal  Rhythm: Regular Rhythm  Sounds: Normal        Mental Status:  Mental Status Findings:  Cooperative, Alert and Oriented         Anesthesia Plan  Type of Anesthesia, risks & benefits discussed:  Anesthesia Type:  Gen Natural Airway    Patient's Preference:   Plan Factors:          Intra-op Monitoring Plan: Standard ASA Monitors  Intra-op Monitoring Plan Comments:   Post Op Pain Control Plan:   Post Op Pain Control Plan Comments:     Induction:   IV  Beta Blocker:  Patient is not currently on a Beta-Blocker (No further documentation required).       Informed Consent: Informed consent signed with the Patient and all parties understand the risks and agree with  anesthesia plan.  All questions answered.  Anesthesia consent signed with patient.  ASA Score: 2     Day of Surgery Review of History & Physical:    H&P Update referred to the surgeon/provider.          Ready For Surgery From Anesthesia Perspective.           Physical Exam  General: Well nourished    Airway:  Mallampati: III / II  Mouth Opening: Normal  TM Distance: 4 - 6 cm  Tongue: Normal  Neck ROM: Normal ROM    Chest/Lungs:  Clear to auscultation, Normal Respiratory Rate    Heart:  Rate: Normal  Rhythm: Regular Rhythm  Sounds: Normal          Anesthesia Plan  Type of Anesthesia, risks & benefits discussed:    Anesthesia Type: Gen Natural Airway  Intra-op Monitoring Plan: Standard ASA Monitors  Induction:  IV  Informed Consent: Informed consent signed with the Patient and all parties understand the risks and agree with anesthesia plan.  All questions answered.   ASA Score: 2  Day of Surgery Review of History & Physical: H&P Update referred to the surgeon/provider.    Ready For Surgery From Anesthesia Perspective.       .

## 2023-06-06 NOTE — H&P
History & Physical - Short Stay  Gastroenterology      SUBJECTIVE:     Procedure: Colonoscopy    Chief Complaint/Indication for Procedure: Previous Polyps    PTA Medications   Medication Sig    BIOTIN ORAL Take by mouth Daily.    buPROPion (WELLBUTRIN XL) 300 MG 24 hr tablet TAKE 1 TABLET BY MOUTH EVERY DAY    DOXYLAMINE SUCCINATE (UNISOM ORAL) Take 100 mg by mouth every evening. 2 tablets nightly    estradioL (VIVELLE-DOT) 0.075 mg/24 hr APPLY 1 PATCH TOPICALLY TO THE SKIN 2 TIMES A WEEK    gabapentin (NEURONTIN) 300 MG capsule TAKE 1 CAPSULE(300 MG) BY MOUTH TWICE DAILY    levothyroxine (SYNTHROID) 100 MCG tablet TAKE 1 TABLET(100 MCG) BY MOUTH EVERY DAY    meloxicam (MOBIC) 7.5 MG tablet TAKE 2 TABLETS(15 MG) BY MOUTH EVERY DAY    aspirin 81 MG Chew Take 81 mg by mouth once daily.       Review of patient's allergies indicates:  No Known Allergies     Past Medical History:   Diagnosis Date    BRCA1 negative     BRCA2 negative     GERD (gastroesophageal reflux disease)     Hashimoto's thyroiditis     Hypothyroidism     Pancreatitis ,    Postmenopausal HRT (hormone replacement therapy)      Past Surgical History:   Procedure Laterality Date    CHOLECYSTECTOMY      COLONOSCOPY      every 5    COLONOSCOPY N/A 10/23/2017    Procedure: COLONOSCOPY;  Surgeon: Pankaj Shah MD;  Location: [facility];  Service: Endoscopy;  Laterality: N/A;    ESOPHAGOGASTRODUODENOSCOPY      EYE SURGERY Bilateral     cataract    HYSTERECTOMY      heavy periods    OOPHORECTOMY       Family History   Problem Relation Age of Onset    Breast cancer Mother 38         at 39 years of age    Arthritis Father     Breast cancer Sister 69    Breast cancer Maternal Grandmother     Heart disease Neg Hx      Social History     Tobacco Use    Smoking status: Former     Types: Cigarettes     Quit date: 1995     Years since quittin.8    Smokeless tobacco: Never   Substance Use Topics    Alcohol use: Yes      Alcohol/week: 9.0 standard drinks     Types: 1 Glasses of wine, 1 Shots of liquor, 7 Standard drinks or equivalent per week     Comment: 2-3 drinks per day    Drug use: No         OBJECTIVE:     Vital Signs (Most Recent)       Physical Exam:                                                       GENERAL:  Comfortable, in no acute distress.                                 HEENT EXAM:  Nonicteric.  No adenopathy.  Oropharynx is clear.               NECK:  Supple.                                                               LUNGS:  Clear.                                                               CARDIAC:  Regular rate and rhythm.  S1, S2.  No murmur.                      ABDOMEN:  Soft, positive bowel sounds, nontender.  No hepatosplenomegaly or masses.  No rebound or guarding.                                             EXTREMITIES:  No edema.     MENTAL STATUS:  Normal, alert and oriented.      ASSESSMENT/PLAN:     Assessment: Previous Polyps    Plan: Colonoscopy    Anesthesia Plan: General    ASA Grade: ASA 2 - Patient with mild systemic disease with no functional limitations    MALLAMPATI SCORE:  I (soft palate, uvula, fauces, and tonsillar pillars visible)

## 2023-06-06 NOTE — ANESTHESIA POSTPROCEDURE EVALUATION
Anesthesia Post Evaluation    Patient: Katerina Dickson    Procedure(s) Performed: Procedure(s) (LRB):  COLONOSCOPY (N/A)    Final Anesthesia Type: general      Patient location during evaluation: PACU  Patient participation: Yes- Able to Participate  Level of consciousness: awake and alert  Post-procedure vital signs: reviewed and stable  Pain management: adequate  Airway patency: patent    PONV status at discharge: No PONV  Anesthetic complications: no      Cardiovascular status: blood pressure returned to baseline  Respiratory status: unassisted and room air  Hydration status: euvolemic  Follow-up not needed.          Vitals Value Taken Time   /63 06/06/23 1041   Temp 36.7 °C (98 °F) 06/06/23 1041   Pulse 65 06/06/23 1041   Resp 16 06/06/23 1041   SpO2 100 % 06/06/23 1041         Event Time   Out of Recovery 10:44:00         Pain/Jasson Score: Jasson Score: 10 (6/6/2023 10:29 AM)

## 2023-06-06 NOTE — DISCHARGE SUMMARY
[place] - Endoscopy  Discharge Note  Short Stay  Discharge Note  Short Stay      SUMMARY     Admit Date: 6/6/2023    Attending Physician: Pankaj Shah MD     Discharge Physician: Pankaj Shah MD    Discharge Date: 6/6/2023 10:13 AM    Final Diagnosis: Screen for colon cancer [Z12.11]    Disposition: HOME OR SELF CARE    Patient Instructions:   Current Discharge Medication List        CONTINUE these medications which have NOT CHANGED    Details   BIOTIN ORAL Take by mouth Daily.      buPROPion (WELLBUTRIN XL) 300 MG 24 hr tablet TAKE 1 TABLET BY MOUTH EVERY DAY  Qty: 90 tablet, Refills: 3    Associated Diagnoses: Major depressive disorder, recurrent, mild      DOXYLAMINE SUCCINATE (UNISOM ORAL) Take 100 mg by mouth every evening. 2 tablets nightly      estradioL (VIVELLE-DOT) 0.075 mg/24 hr APPLY 1 PATCH TOPICALLY TO THE SKIN 2 TIMES A WEEK  Qty: 24 patch, Refills: 3    Associated Diagnoses: Postmenopausal HRT (hormone replacement therapy)      gabapentin (NEURONTIN) 300 MG capsule TAKE 1 CAPSULE(300 MG) BY MOUTH TWICE DAILY  Qty: 180 capsule, Refills: 2    Associated Diagnoses: Osteoarthritis of spine with radiculopathy, cervical region      levothyroxine (SYNTHROID) 100 MCG tablet TAKE 1 TABLET(100 MCG) BY MOUTH EVERY DAY  Qty: 90 tablet, Refills: 3    Associated Diagnoses: Hypothyroidism, unspecified type      meloxicam (MOBIC) 7.5 MG tablet TAKE 2 TABLETS(15 MG) BY MOUTH EVERY DAY  Qty: 180 tablet, Refills: 3      aspirin 81 MG Chew Take 81 mg by mouth once daily.             Discharge Procedure Orders (must include Diet, Follow-up, Activity)    Follow Up:  Follow up with PCP as previously scheduled  Resume routine diet.  Activity as tolerated.    No driving day of procedure.

## 2023-06-06 NOTE — TRANSFER OF CARE
"Anesthesia Transfer of Care Note    Patient: Katerina Dickson    Procedure(s) Performed: Procedure(s) (LRB):  COLONOSCOPY (N/A)    Patient location: PACU    Anesthesia Type: general    Transport from OR: Transported from OR on room air with adequate spontaneous ventilation    Post pain: adequate analgesia    Post assessment: no apparent anesthetic complications    Post vital signs: stable    Level of consciousness: awake and sedated    Nausea/Vomiting: no nausea/vomiting    Complications: none    Transfer of care protocol was followed      Last vitals:   Visit Vitals  /67   Pulse 68   Temp 36.4 °C (97.5 °F)   Resp 14   Ht 5' 9" (1.753 m)   Wt 65.8 kg (145 lb)   SpO2 96%   Breastfeeding No   BMI 21.41 kg/m²     "

## 2023-07-31 ENCOUNTER — PATIENT MESSAGE (OUTPATIENT)
Dept: FAMILY MEDICINE | Facility: CLINIC | Age: 68
End: 2023-07-31
Payer: COMMERCIAL

## 2023-07-31 DIAGNOSIS — M47.22 OSTEOARTHRITIS OF SPINE WITH RADICULOPATHY, CERVICAL REGION: ICD-10-CM

## 2023-07-31 RX ORDER — GABAPENTIN 300 MG/1
300 CAPSULE ORAL 2 TIMES DAILY
Qty: 180 CAPSULE | Refills: 2 | Status: SHIPPED | OUTPATIENT
Start: 2023-07-31

## 2023-07-31 NOTE — TELEPHONE ENCOUNTER
No care due was identified.  [facility] Embedded Care Due Messages. Reference number: [id number].   7/31/2023 12:56:22 PM CDT

## 2023-10-27 ENCOUNTER — HOSPITAL ENCOUNTER (OUTPATIENT)
Dept: RADIOLOGY | Facility: HOSPITAL | Age: 68
Discharge: HOME | End: 2023-10-27
Attending: INTERNAL MEDICINE
Payer: MEDICARE

## 2023-10-27 ENCOUNTER — OFFICE VISIT (OUTPATIENT)
Dept: FAMILY MEDICINE | Facility: CLINIC | Age: 68
End: 2023-10-27
Payer: MEDICARE

## 2023-10-27 VITALS
HEART RATE: 85 BPM | SYSTOLIC BLOOD PRESSURE: 122 MMHG | TEMPERATURE: 98 F | OXYGEN SATURATION: 96 % | HEIGHT: 69 IN | BODY MASS INDEX: 22.14 KG/M2 | WEIGHT: 149.5 LBS | DIASTOLIC BLOOD PRESSURE: 82 MMHG

## 2023-10-27 DIAGNOSIS — M18.11 PRIMARY OSTEOARTHRITIS OF FIRST CARPOMETACARPAL JOINT OF RIGHT HAND: Primary | ICD-10-CM

## 2023-10-27 DIAGNOSIS — M25.551 RIGHT HIP PAIN: ICD-10-CM

## 2023-10-27 DIAGNOSIS — M18.11 PRIMARY OSTEOARTHRITIS OF FIRST CARPOMETACARPAL JOINT OF RIGHT HAND: ICD-10-CM

## 2023-10-27 DIAGNOSIS — Z23 NEED FOR IMMUNIZATION AGAINST INFLUENZA: ICD-10-CM

## 2023-10-27 PROCEDURE — 1160F PR REVIEW ALL MEDS BY PRESCRIBER/CLIN PHARMACIST DOCUMENTED: ICD-10-PCS | Mod: CPTII,S$GLB,, | Performed by: INTERNAL MEDICINE

## 2023-10-27 PROCEDURE — 3079F PR MOST RECENT DIASTOLIC BLOOD PRESSURE 80-89 MM HG: ICD-10-PCS | Mod: CPTII,S$GLB,, | Performed by: INTERNAL MEDICINE

## 2023-10-27 PROCEDURE — 1159F MED LIST DOCD IN RCRD: CPT | Mod: CPTII,S$GLB,, | Performed by: INTERNAL MEDICINE

## 2023-10-27 PROCEDURE — G0008 FLU VACCINE - QUADRIVALENT - ADJUVANTED: ICD-10-PCS | Mod: S$GLB,,, | Performed by: INTERNAL MEDICINE

## 2023-10-27 PROCEDURE — 90694 FLU VACCINE - QUADRIVALENT - ADJUVANTED: ICD-10-PCS | Mod: S$GLB,,, | Performed by: INTERNAL MEDICINE

## 2023-10-27 PROCEDURE — 1100F PR PT FALLS ASSESS DOC 2+ FALLS/FALL W/INJURY/YR: ICD-10-PCS | Mod: CPTII,S$GLB,, | Performed by: INTERNAL MEDICINE

## 2023-10-27 PROCEDURE — 1100F PTFALLS ASSESS-DOCD GE2>/YR: CPT | Mod: CPTII,S$GLB,, | Performed by: INTERNAL MEDICINE

## 2023-10-27 PROCEDURE — 73130 X-RAY EXAM OF HAND: CPT | Mod: TC,FY,PO,RT

## 2023-10-27 PROCEDURE — 1159F PR MEDICATION LIST DOCUMENTED IN MEDICAL RECORD: ICD-10-PCS | Mod: CPTII,S$GLB,, | Performed by: INTERNAL MEDICINE

## 2023-10-27 PROCEDURE — 3079F DIAST BP 80-89 MM HG: CPT | Mod: CPTII,S$GLB,, | Performed by: INTERNAL MEDICINE

## 2023-10-27 PROCEDURE — 99213 OFFICE O/P EST LOW 20 MIN: CPT | Mod: S$GLB,,, | Performed by: INTERNAL MEDICINE

## 2023-10-27 PROCEDURE — 73502 X-RAY EXAM HIP UNI 2-3 VIEWS: CPT | Mod: TC,FY,PO,RT

## 2023-10-27 PROCEDURE — 73130 X-RAY EXAM OF HAND: CPT | Mod: 26,RT,, | Performed by: RADIOLOGY

## 2023-10-27 PROCEDURE — 1126F AMNT PAIN NOTED NONE PRSNT: CPT | Mod: CPTII,S$GLB,, | Performed by: INTERNAL MEDICINE

## 2023-10-27 PROCEDURE — 3074F SYST BP LT 130 MM HG: CPT | Mod: CPTII,S$GLB,, | Performed by: INTERNAL MEDICINE

## 2023-10-27 PROCEDURE — 73502 X-RAY EXAM HIP UNI 2-3 VIEWS: CPT | Mod: 26,RT,, | Performed by: RADIOLOGY

## 2023-10-27 PROCEDURE — 3008F PR BODY MASS INDEX (BMI) DOCUMENTED: ICD-10-PCS | Mod: CPTII,S$GLB,, | Performed by: INTERNAL MEDICINE

## 2023-10-27 PROCEDURE — 3288F FALL RISK ASSESSMENT DOCD: CPT | Mod: CPTII,S$GLB,, | Performed by: INTERNAL MEDICINE

## 2023-10-27 PROCEDURE — 1160F RVW MEDS BY RX/DR IN RCRD: CPT | Mod: CPTII,S$GLB,, | Performed by: INTERNAL MEDICINE

## 2023-10-27 PROCEDURE — G0008 ADMIN INFLUENZA VIRUS VAC: HCPCS | Mod: S$GLB,,, | Performed by: INTERNAL MEDICINE

## 2023-10-27 PROCEDURE — 90694 VACC AIIV4 NO PRSRV 0.5ML IM: CPT | Mod: S$GLB,,, | Performed by: INTERNAL MEDICINE

## 2023-10-27 PROCEDURE — 99213 PR OFFICE/OUTPT VISIT, EST, LEVL III, 20-29 MIN: ICD-10-PCS | Mod: S$GLB,,, | Performed by: INTERNAL MEDICINE

## 2023-10-27 PROCEDURE — 1157F PR ADVANCE CARE PLAN OR EQUIV PRESENT IN MEDICAL RECORD: ICD-10-PCS | Mod: CPTII,S$GLB,, | Performed by: INTERNAL MEDICINE

## 2023-10-27 PROCEDURE — 1126F PR PAIN SEVERITY QUANTIFIED, NO PAIN PRESENT: ICD-10-PCS | Mod: CPTII,S$GLB,, | Performed by: INTERNAL MEDICINE

## 2023-10-27 PROCEDURE — 3288F PR FALLS RISK ASSESSMENT DOCUMENTED: ICD-10-PCS | Mod: CPTII,S$GLB,, | Performed by: INTERNAL MEDICINE

## 2023-10-27 PROCEDURE — 1157F ADVNC CARE PLAN IN RCRD: CPT | Mod: CPTII,S$GLB,, | Performed by: INTERNAL MEDICINE

## 2023-10-27 PROCEDURE — 3074F PR MOST RECENT SYSTOLIC BLOOD PRESSURE < 130 MM HG: ICD-10-PCS | Mod: CPTII,S$GLB,, | Performed by: INTERNAL MEDICINE

## 2023-10-27 PROCEDURE — 73130 XR HAND COMPLETE 3 VIEW RIGHT: ICD-10-PCS | Mod: 26,RT,, | Performed by: RADIOLOGY

## 2023-10-27 PROCEDURE — 73502 XR HIP WITH PELVIS WHEN PERFORMED, 2 OR 3  VIEWS RIGHT: ICD-10-PCS | Mod: 26,RT,, | Performed by: RADIOLOGY

## 2023-10-27 PROCEDURE — 3008F BODY MASS INDEX DOCD: CPT | Mod: CPTII,S$GLB,, | Performed by: INTERNAL MEDICINE

## 2023-10-27 RX ORDER — MELOXICAM 15 MG/1
15 TABLET ORAL DAILY
Qty: 90 TABLET | Refills: 3 | Status: SHIPPED | OUTPATIENT
Start: 2023-10-27

## 2023-10-27 NOTE — PROGRESS NOTES
Subjective:       Patient ID: Katerina Dickson is a 68 y.o. female.    Medication List with Changes/Refills   New Medications    MELOXICAM (MOBIC) 15 MG TABLET    Take 1 tablet (15 mg total) by mouth once daily.   Current Medications    BIOTIN ORAL    Take by mouth Daily.    BUPROPION (WELLBUTRIN XL) 300 MG 24 HR TABLET    TAKE 1 TABLET BY MOUTH EVERY DAY    DOXYLAMINE SUCCINATE (UNISOM ORAL)    Take 100 mg by mouth every evening. 2 tablets nightly    ESTRADIOL (VIVELLE-DOT) 0.075 MG/24 HR    APPLY 1 PATCH TOPICALLY TO THE SKIN 2 TIMES A WEEK    GABAPENTIN (NEURONTIN) 300 MG CAPSULE    Take 1 capsule (300 mg total) by mouth 2 (two) times daily.    LEVOTHYROXINE (SYNTHROID) 100 MCG TABLET    TAKE 1 TABLET(100 MCG) BY MOUTH EVERY DAY   Discontinued Medications    ASPIRIN 81 MG CHEW    Take 81 mg by mouth once daily.    MELOXICAM (MOBIC) 7.5 MG TABLET    TAKE 2 TABLETS(15 MG) BY MOUTH EVERY DAY       Chief Complaint: Joint pain  She presents today with multiple joint issues.     She has pain at the base of her right thumb that is worsening. It wakes her at night. She does have some swelling and joint changes. No redness or warmth.  Worse when she is holding a book and better with her brace.  She uses ibuprofen but this does not help.      She has right hip pain that is sharp stabbing pain that can radiate down her lateral leg or into her buttocks for the last 3 months. Worse with exercise, sitting and laying flat.  Better with moving and walking. No known injury but this hip pain has worsened since her right knee pain. She was seen by orthopedics who injected her knee which has helped but now she is having hip pain.  She takes mobic 7.5 mg 2 pills a day and is taking ibuprofen. She is struggling to sleep due to pain.    Review of Systems   Constitutional:  Negative for activity change, appetite change, chills, fatigue and fever.   HENT:  Negative for congestion, ear discharge, ear pain, mouth sores, postnasal drip,  "rhinorrhea, sinus pressure and sore throat.    Eyes:  Negative for pain, discharge and redness.   Respiratory:  Negative for cough, chest tightness, shortness of breath and wheezing.    Gastrointestinal:  Negative for abdominal pain, constipation, diarrhea, nausea and vomiting.   Genitourinary:  Negative for dysuria.   Musculoskeletal:  Positive for arthralgias. Negative for neck stiffness.   Skin:  Negative for rash.   Neurological:  Negative for headaches.   Hematological:  Negative for adenopathy.       Objective:      Vitals:    10/27/23 0927   BP: 122/82   BP Location: Left arm   Patient Position: Sitting   BP Method: Medium (Manual)   Pulse: 85   Temp: 97.9 °F (36.6 °C)   SpO2: 96%   Weight: 67.8 kg (149 lb 7.6 oz)   Height: 5' 9" (1.753 m)     Body mass index is 22.07 kg/m².  Physical Exam    General appearance: No acute distress, cooperative  Neck: FROM, soft, supple  Lymph: no anterior or posterior cervical adenopathy  Heart::  Regular rate and rhythm, no murmur  Lung: Clear to ascultation bilaterally, no wheezing, no rales, no rhonchi, no distress  Skin: no rashes, no lesions  Extremities: no edema, no cyanosis  Neuro: no focal abnormalities, strength 5/5 b/l UE and LE, 2+ DTRs b/l UE and LE, normal gait  Peripheral pulses: 2+ pedal pulses bilaterally, good perfusion and color  Musculoskeletal: FROM, good strenth, no tenderness  Joint: right CMC thumb joint with tenderness, mild swelling, no erythema or warmth, FROM.  Right Hip with FROM, tenderness on palpation of lateral hip    Assessment:       1. Primary osteoarthritis of first carpometacarpal joint of right hand    2. Right hip pain    3. Need for immunization against influenza        Plan:       Primary osteoarthritis of first carpometacarpal joint of right hand  Suspect OA and will refer to hand surgeon for evaluation. Will get xrays prior to that appt. Continue on mobic 15 mg daily. Stop ibuprofen while on mobic. Okay to use tylenol for pain.   -   "   Ambulatory referral/consult to Hand Surgery; Future; Expected date: 11/03/2023  -     X-Ray Hand Complete Right; Future; Expected date: 10/27/2023  -     meloxicam (MOBIC) 15 MG tablet; Take 1 tablet (15 mg total) by mouth once daily.  Dispense: 90 tablet; Refill: 3    Right hip pain  Suspect arthritis but there may be a component to trochanteric bursitis. Will get xrays and referral back to her orthopedics. Continue mobic.  Referral to PT for evaluation.   -     X-Ray Hip 2 or 3 views Right (with Pelvis when performed); Future; Expected date: 10/27/2023  -     Ambulatory referral/consult to Orthopedics; Future; Expected date: 11/03/2023  -     Ambulatory referral/consult to Physical/Occupational Therapy; Future; Expected date: 11/03/2023    Need for immunization against influenza  -     Influenza - Quadrivalent (Adjuvanted)    Follow up if symptoms worsen or fail to improve.

## 2023-10-30 ENCOUNTER — OFFICE VISIT (OUTPATIENT)
Dept: ORTHOPEDICS | Facility: CLINIC | Age: 68
End: 2023-10-30
Payer: MEDICARE

## 2023-10-30 DIAGNOSIS — M18.11 ARTHRITIS OF CARPOMETACARPAL (CMC) JOINT OF RIGHT THUMB: Primary | ICD-10-CM

## 2023-10-30 DIAGNOSIS — M25.551 RIGHT HIP PAIN: ICD-10-CM

## 2023-10-30 PROCEDURE — 1101F PT FALLS ASSESS-DOCD LE1/YR: CPT | Mod: CPTII,S$GLB,, | Performed by: ORTHOPAEDIC SURGERY

## 2023-10-30 PROCEDURE — 1125F AMNT PAIN NOTED PAIN PRSNT: CPT | Mod: CPTII,S$GLB,, | Performed by: ORTHOPAEDIC SURGERY

## 2023-10-30 PROCEDURE — 1101F PR PT FALLS ASSESS DOC 0-1 FALLS W/OUT INJ PAST YR: ICD-10-PCS | Mod: CPTII,S$GLB,, | Performed by: ORTHOPAEDIC SURGERY

## 2023-10-30 PROCEDURE — 1157F PR ADVANCE CARE PLAN OR EQUIV PRESENT IN MEDICAL RECORD: ICD-10-PCS | Mod: CPTII,S$GLB,, | Performed by: ORTHOPAEDIC SURGERY

## 2023-10-30 PROCEDURE — 1157F ADVNC CARE PLAN IN RCRD: CPT | Mod: CPTII,S$GLB,, | Performed by: ORTHOPAEDIC SURGERY

## 2023-10-30 PROCEDURE — 3288F FALL RISK ASSESSMENT DOCD: CPT | Mod: CPTII,S$GLB,, | Performed by: ORTHOPAEDIC SURGERY

## 2023-10-30 PROCEDURE — 1125F PR PAIN SEVERITY QUANTIFIED, PAIN PRESENT: ICD-10-PCS | Mod: CPTII,S$GLB,, | Performed by: ORTHOPAEDIC SURGERY

## 2023-10-30 PROCEDURE — 99999 PR PBB SHADOW E&M-EST. PATIENT-LVL II: ICD-10-PCS | Mod: PBBFAC,,, | Performed by: ORTHOPAEDIC SURGERY

## 2023-10-30 PROCEDURE — 20600 DRAIN/INJ JOINT/BURSA W/O US: CPT | Mod: RT,S$GLB,, | Performed by: ORTHOPAEDIC SURGERY

## 2023-10-30 PROCEDURE — 20600 SMALL JOINT ASPIRATION/INJECTION: R THUMB CMC: ICD-10-PCS | Mod: RT,S$GLB,, | Performed by: ORTHOPAEDIC SURGERY

## 2023-10-30 PROCEDURE — 99204 OFFICE O/P NEW MOD 45 MIN: CPT | Mod: 25,S$GLB,, | Performed by: ORTHOPAEDIC SURGERY

## 2023-10-30 PROCEDURE — 3288F PR FALLS RISK ASSESSMENT DOCUMENTED: ICD-10-PCS | Mod: CPTII,S$GLB,, | Performed by: ORTHOPAEDIC SURGERY

## 2023-10-30 PROCEDURE — 1159F PR MEDICATION LIST DOCUMENTED IN MEDICAL RECORD: ICD-10-PCS | Mod: CPTII,S$GLB,, | Performed by: ORTHOPAEDIC SURGERY

## 2023-10-30 PROCEDURE — 99204 PR OFFICE/OUTPT VISIT, NEW, LEVL IV, 45-59 MIN: ICD-10-PCS | Mod: 25,S$GLB,, | Performed by: ORTHOPAEDIC SURGERY

## 2023-10-30 PROCEDURE — 1159F MED LIST DOCD IN RCRD: CPT | Mod: CPTII,S$GLB,, | Performed by: ORTHOPAEDIC SURGERY

## 2023-10-30 PROCEDURE — 99999 PR PBB SHADOW E&M-EST. PATIENT-LVL II: CPT | Mod: PBBFAC,,, | Performed by: ORTHOPAEDIC SURGERY

## 2023-10-30 RX ORDER — TRIAMCINOLONE ACETONIDE 40 MG/ML
40 INJECTION, SUSPENSION INTRA-ARTICULAR; INTRAMUSCULAR
Status: DISCONTINUED | OUTPATIENT
Start: 2023-10-30 | End: 2023-10-30 | Stop reason: HOSPADM

## 2023-10-30 RX ADMIN — TRIAMCINOLONE ACETONIDE 40 MG: 40 INJECTION, SUSPENSION INTRA-ARTICULAR; INTRAMUSCULAR at 11:10

## 2023-10-30 NOTE — PROCEDURES
Small Joint Aspiration/Injection: R thumb CMC    Date/Time: 10/30/2023 11:00 AM    Performed by: Onur Marshall MD  Authorized by: Onur Marshall MD    Consent Done?:  Yes (Verbal)  Indications:  Pain  Site marked: the procedure site was marked    Timeout: prior to procedure the correct patient, procedure, and site was verified    Prep: patient was prepped and draped in usual sterile fashion      Local anesthesia used?: No    Location:  Thumb  Site:  R thumb CMC (Right thumb CMC joint)  Ultrasonic guidance for needle placement?: No    Medications:  40 mg triamcinolone acetonide 40 mg/mL  Patient tolerance:  Patient tolerated the procedure well with no immediate complications

## 2023-10-30 NOTE — PROGRESS NOTES
10/30/2023    Chief Complaint:  Chief Complaint   Patient presents with    Right Hand - Pain    Arthritis       HPI:  Katerina Dickson is a 68 y.o. female, who presents to clinic today she has pain at the base of her right thumb.  This has been going on for months.  It is gradually getting worse.  She has tried wearing a splint.  This is not significantly improved her symptoms.  She has had a set of x-rays.  She is here today to discuss further treatment options.    PMHX:  Past Medical History:   Diagnosis Date    BRCA1 negative     BRCA2 negative     GERD (gastroesophageal reflux disease)     Hashimoto's thyroiditis     Hypothyroidism     Pancreatitis ,    Postmenopausal HRT (hormone replacement therapy)        PSHX:  Past Surgical History:   Procedure Laterality Date    CHOLECYSTECTOMY      COLONOSCOPY      every 5    COLONOSCOPY N/A 10/23/2017    Procedure: COLONOSCOPY;  Surgeon: Pankaj Shah MD;  Location: [facility] ENDO;  Service: Endoscopy;  Laterality: N/A;    COLONOSCOPY N/A 2023    Procedure: COLONOSCOPY;  Surgeon: Pankaj Shah MD;  Location: [facility] ENDO;  Service: Endoscopy;  Laterality: N/A;    ESOPHAGOGASTRODUODENOSCOPY      EYE SURGERY Bilateral     cataract    HYSTERECTOMY      heavy periods    OOPHORECTOMY         FMHX:  Family History   Problem Relation Age of Onset    Breast cancer Mother 38         at 39 years of age    Arthritis Father     Breast cancer Sister 69    Breast cancer Maternal Grandmother     Heart disease Neg Hx        SOCHX:  Social History     Tobacco Use    Smoking status: Former     Current packs/day: 0.00     Types: Cigarettes     Quit date: 1995     Years since quittin.2    Smokeless tobacco: Never   Substance Use Topics    Alcohol use: Yes     Alcohol/week: 9.0 standard drinks of alcohol     Types: 1 Glasses of wine, 1 Shots of liquor, 7 Standard drinks or equivalent per week     Comment: 2-3 drinks per day       ALLERGIES:  Patient  has no known allergies.    CURRENT MEDICATIONS:  Current Outpatient Medications on File Prior to Visit   Medication Sig Dispense Refill    BIOTIN ORAL Take by mouth Daily.      buPROPion (WELLBUTRIN XL) 300 MG 24 hr tablet TAKE 1 TABLET BY MOUTH EVERY DAY 90 tablet 3    DOXYLAMINE SUCCINATE (UNISOM ORAL) Take 100 mg by mouth every evening. 2 tablets nightly      estradioL (VIVELLE-DOT) 0.075 mg/24 hr APPLY 1 PATCH TOPICALLY TO THE SKIN 2 TIMES A WEEK 24 patch 3    gabapentin (NEURONTIN) 300 MG capsule Take 1 capsule (300 mg total) by mouth 2 (two) times daily. 180 capsule 2    levothyroxine (SYNTHROID) 100 MCG tablet TAKE 1 TABLET(100 MCG) BY MOUTH EVERY DAY 90 tablet 3    meloxicam (MOBIC) 15 MG tablet Take 1 tablet (15 mg total) by mouth once daily. 90 tablet 3     No current facility-administered medications on file prior to visit.       REVIEW OF SYSTEMS:  Review of Systems   Constitutional: Negative.    HENT: Negative.     Eyes: Negative.    Respiratory: Negative.     Cardiovascular: Negative.    Gastrointestinal: Negative.    Genitourinary: Negative.    Musculoskeletal:  Positive for joint pain.   Skin: Negative.    Neurological:  Positive for weakness.   Endo/Heme/Allergies: Negative.    Psychiatric/Behavioral: Negative.       GENERAL PHYSICAL EXAM:   There were no vitals taken for this visit.   GEN: well developed, well nourished, no acute distress   HENT: Normocephalic, atraumatic   EYES: No discharge, conjunctiva normal   NECK: Supple, non-tender   PULM: No wheezing, no respiratory distress   CV: RRR   ABD: Soft, non-tender    ORTHO EXAM:   Examination of the right hand reveals that there is no edema.  There are no skin changes.  He does have prominence of the CMC joint.  Palpation over the joint does produce significant tenderness.  Motion of the joint does produce crepitance.  She does report grossly intact sensation and capillary refill is less than 2 seconds in the digits    RADIOLOGY:   X-rays of the  right hand from 10/27/2023 have been reviewed.  There is noted to be severe degenerative change about the thumb CMC joint with mild degenerative changes at other joints.    ASSESSMENT:   Right thumb CMC arthritis    PLAN:  1. After informed consent was obtained injection was placed to the right thumb CMC joint     2. She will follow up with me as needed.

## 2023-10-31 ENCOUNTER — PATIENT MESSAGE (OUTPATIENT)
Dept: FAMILY MEDICINE | Facility: CLINIC | Age: 68
End: 2023-10-31
Payer: MEDICARE

## 2024-01-12 ENCOUNTER — HOSPITAL ENCOUNTER (OUTPATIENT)
Dept: RADIOLOGY | Facility: HOSPITAL | Age: 69
Discharge: HOME | End: 2024-01-12
Attending: INTERNAL MEDICINE
Payer: MEDICARE

## 2024-01-12 DIAGNOSIS — Z12.31 ENCOUNTER FOR SCREENING MAMMOGRAM FOR MALIGNANT NEOPLASM OF BREAST: ICD-10-CM

## 2024-01-12 PROCEDURE — 77067 SCR MAMMO BI INCL CAD: CPT | Mod: 26,,, | Performed by: RADIOLOGY

## 2024-01-12 PROCEDURE — 77067 SCR MAMMO BI INCL CAD: CPT | Mod: TC,PN

## 2024-01-12 PROCEDURE — 77063 BREAST TOMOSYNTHESIS BI: CPT | Mod: 26,,, | Performed by: RADIOLOGY

## 2024-01-18 DIAGNOSIS — E03.9 HYPOTHYROIDISM, UNSPECIFIED TYPE: ICD-10-CM

## 2024-01-18 DIAGNOSIS — F33.0 MAJOR DEPRESSIVE DISORDER, RECURRENT, MILD: ICD-10-CM

## 2024-01-18 RX ORDER — BUPROPION HYDROCHLORIDE 300 MG/1
TABLET ORAL
Qty: 90 TABLET | Refills: 3 | Status: SHIPPED | OUTPATIENT
Start: 2024-01-18 | End: 2024-04-28

## 2024-01-18 RX ORDER — LEVOTHYROXINE SODIUM 100 UG/1
TABLET ORAL
Qty: 90 TABLET | Refills: 0 | Status: SHIPPED | OUTPATIENT
Start: 2024-01-18 | End: 2024-04-28

## 2024-01-18 NOTE — TELEPHONE ENCOUNTER
Care Due:                  Date            Visit Type   Department     Provider  --------------------------------------------------------------------------------                                MYCHART                              FOLLOWUP/OF  [facility]  Last Visit: 10-      FICE VISIT   MEDICINE       Kaitlyn Butler  Next Visit: None Scheduled  None         None Found                                                            Last  Test          Frequency    Reason                     Performed    Due Date  --------------------------------------------------------------------------------    CBC.........  12 months..  meloxicam................  03- 02-    CMP.........  12 months..  meloxicam................  02- 02-    [facility] Embedded Care Due Messages. Reference number: [id number].   1/18/2024 11:26:35 AM CST

## 2024-01-18 NOTE — TELEPHONE ENCOUNTER
No care due was identified.  Health [facility] Embedded Care Due Messages. Reference number: [id number].   1/18/2024 11:33:24 AM CST

## 2024-01-19 RX ORDER — BUPROPION HYDROCHLORIDE 300 MG/1
300 TABLET ORAL DAILY
Qty: 90 TABLET | Refills: 3 | Status: SHIPPED | OUTPATIENT
Start: 2024-01-19

## 2024-01-19 RX ORDER — LEVOTHYROXINE SODIUM 100 UG/1
100 TABLET ORAL DAILY
Qty: 90 TABLET | Refills: 3 | Status: SHIPPED | OUTPATIENT
Start: 2024-01-19

## 2024-01-19 NOTE — TELEPHONE ENCOUNTER
Provider Staff:  Action required for this patient    Requires labs      Please see care gap opportunities below in Care Due Message.    Thanks!  [facility]     Appointments      Date Provider   Last Visit   10/27/2023 Kaitlyn Butler, DO   Next Visit   1/18/2024 Kaitlyn Butler, DO     Refill Decision Note   Katerina Yessi  is requesting a refill authorization.  Brief Assessment and Rationale for Refill:  Approve     Medication Therapy Plan:        Comments:     Note composed:8:33 PM 01/18/2024

## 2024-01-30 ENCOUNTER — OFFICE VISIT (OUTPATIENT)
Dept: OBSTETRICS AND GYNECOLOGY | Facility: CLINIC | Age: 69
End: 2024-01-30
Payer: MEDICARE

## 2024-01-30 VITALS
HEIGHT: 69 IN | DIASTOLIC BLOOD PRESSURE: 72 MMHG | BODY MASS INDEX: 22.66 KG/M2 | WEIGHT: 153 LBS | SYSTOLIC BLOOD PRESSURE: 114 MMHG

## 2024-01-30 DIAGNOSIS — Z79.890 POSTMENOPAUSAL HRT (HORMONE REPLACEMENT THERAPY): ICD-10-CM

## 2024-01-30 PROCEDURE — 1101F PT FALLS ASSESS-DOCD LE1/YR: CPT | Mod: CPTII,S$GLB,, | Performed by: OBSTETRICS & GYNECOLOGY

## 2024-01-30 PROCEDURE — 3074F SYST BP LT 130 MM HG: CPT | Mod: CPTII,S$GLB,, | Performed by: OBSTETRICS & GYNECOLOGY

## 2024-01-30 PROCEDURE — 1159F MED LIST DOCD IN RCRD: CPT | Mod: CPTII,S$GLB,, | Performed by: OBSTETRICS & GYNECOLOGY

## 2024-01-30 PROCEDURE — 3008F BODY MASS INDEX DOCD: CPT | Mod: CPTII,S$GLB,, | Performed by: OBSTETRICS & GYNECOLOGY

## 2024-01-30 PROCEDURE — 99999 PR PBB SHADOW E&M-EST. PATIENT-LVL III: CPT | Mod: PBBFAC,,, | Performed by: OBSTETRICS & GYNECOLOGY

## 2024-01-30 PROCEDURE — 3078F DIAST BP <80 MM HG: CPT | Mod: CPTII,S$GLB,, | Performed by: OBSTETRICS & GYNECOLOGY

## 2024-01-30 PROCEDURE — 99213 OFFICE O/P EST LOW 20 MIN: CPT | Mod: S$GLB,,, | Performed by: OBSTETRICS & GYNECOLOGY

## 2024-01-30 PROCEDURE — 1157F ADVNC CARE PLAN IN RCRD: CPT | Mod: CPTII,S$GLB,, | Performed by: OBSTETRICS & GYNECOLOGY

## 2024-01-30 PROCEDURE — 3288F FALL RISK ASSESSMENT DOCD: CPT | Mod: CPTII,S$GLB,, | Performed by: OBSTETRICS & GYNECOLOGY

## 2024-01-30 RX ORDER — ESTRADIOL 0.07 MG/D
FILM, EXTENDED RELEASE TRANSDERMAL
Qty: 24 PATCH | Refills: 3 | Status: SHIPPED | OUTPATIENT
Start: 2024-01-30

## 2024-01-30 NOTE — PROGRESS NOTES
Chief Complaint   Patient presents with    Medication Refill       History of Present Illness: Katerina Dickson is a 68 y.o. female that presents today 1/30/2024 for   Chief Complaint   Patient presents with    Medication Refill         Past Medical History:   Diagnosis Date    BRCA1 negative     BRCA2 negative     GERD (gastroesophageal reflux disease)     Hashimoto's thyroiditis 6/07    Hypothyroidism     Pancreatitis 1988,1995    Postmenopausal HRT (hormone replacement therapy)        Past Surgical History:   Procedure Laterality Date    CHOLECYSTECTOMY  2006    COLONOSCOPY  2012    every 5    COLONOSCOPY N/A 10/23/2017    Procedure: COLONOSCOPY;  Surgeon: Pankaj Shah MD;  Location: [facility] ENDO;  Service: Endoscopy;  Laterality: N/A;    COLONOSCOPY N/A 6/6/2023    Procedure: COLONOSCOPY;  Surgeon: Pankaj Shah MD;  Location: [facility] ENDO;  Service: Endoscopy;  Laterality: N/A;    ESOPHAGOGASTRODUODENOSCOPY      EYE SURGERY Bilateral     cataract    HYSTERECTOMY  1995    heavy periods    OOPHORECTOMY         Outpatient Medications Prior to Visit   Medication Sig Dispense Refill    BIOTIN ORAL Take by mouth Daily.      buPROPion (WELLBUTRIN XL) 300 MG 24 hr tablet TAKE 1 TABLET BY MOUTH EVERY DAY 90 tablet 3    buPROPion (WELLBUTRIN XL) 300 MG 24 hr tablet Take 1 tablet (300 mg total) by mouth once daily. 90 tablet 3    DOXYLAMINE SUCCINATE (UNISOM ORAL) Take 100 mg by mouth every evening. 2 tablets nightly      gabapentin (NEURONTIN) 300 MG capsule Take 1 capsule (300 mg total) by mouth 2 (two) times daily. 180 capsule 2    levothyroxine (SYNTHROID) 100 MCG tablet TAKE 1 TABLET(100 MCG) BY MOUTH EVERY DAY 90 tablet 0    levothyroxine (SYNTHROID) 100 MCG tablet Take 1 tablet (100 mcg total) by mouth once daily. 90 tablet 3    meloxicam (MOBIC) 15 MG tablet Take 1 tablet (15 mg total) by mouth once daily. 90 tablet 3    estradioL (VIVELLE-DOT) 0.075 mg/24 hr APPLY 1 PATCH TOPICALLY TO THE SKIN 2 TIMES A WEEK  "24 patch 3     No facility-administered medications prior to visit.       Review of patient's allergies indicates:  No Known Allergies    Family History   Problem Relation Age of Onset    Breast cancer Mother 38         at 39 years of age    Arthritis Father     Breast cancer Sister 69    Breast cancer Maternal Grandmother     Heart disease Neg Hx        Social History     Tobacco Use    Smoking status: Former     Current packs/day: 0.00     Types: Cigarettes     Quit date: 1995     Years since quittin.4    Smokeless tobacco: Never   Substance Use Topics    Alcohol use: Yes     Alcohol/week: 9.0 standard drinks of alcohol     Types: 1 Glasses of wine, 1 Shots of liquor, 7 Standard drinks or equivalent per week     Comment: 2-3 drinks per day    Drug use: No       OB History    Para Term  AB Living   5 2 2   3 2   SAB IAB Ectopic Multiple Live Births   3       2      # Outcome Date GA Lbr Siddharth/2nd Weight Sex Delivery Anes PTL Lv   5 1989           4 1988           3 Term  40w0d  3.714 kg (8 lb 3 oz) F Vag-Spont None  LAZARA      Birth Comments: no complications   2 1984           1 Term  40w0d  3.345 kg (7 lb 6 oz) M Vag-Spont None  LAZARA      Birth Comments: no complications         /72   Ht 5' 9" (1.753 m)   Wt 69.4 kg (153 lb)       ASSESSMENT/PLAN:  Postmenopausal HRT (hormone replacement therapy)  -     estradioL (VIVELLE-DOT) 0.075 mg/24 hr; APPLY 1 PATCH TOPICALLY TO THE SKIN 2 TIMES A WEEK  Dispense: 24 patch; Refill: 3      20 minutes spent today preparing reviewing previous external notes, reviewing previous results, performing medical examination, orders tests or medications, counseling and documenting.       "

## 2024-02-28 DIAGNOSIS — M25.551 BILATERAL HIP PAIN: Primary | ICD-10-CM

## 2024-02-28 DIAGNOSIS — M25.552 BILATERAL HIP PAIN: Primary | ICD-10-CM

## 2024-03-05 ENCOUNTER — HOSPITAL ENCOUNTER (OUTPATIENT)
Dept: RADIOLOGY | Facility: HOSPITAL | Age: 69
Discharge: HOME | End: 2024-03-05
Attending: ORTHOPAEDIC SURGERY
Payer: MEDICARE

## 2024-03-05 ENCOUNTER — OFFICE VISIT (OUTPATIENT)
Dept: ORTHOPEDICS | Facility: CLINIC | Age: 69
End: 2024-03-05
Payer: MEDICARE

## 2024-03-05 VITALS — WEIGHT: 153 LBS | HEIGHT: 69 IN | BODY MASS INDEX: 22.66 KG/M2

## 2024-03-05 DIAGNOSIS — M17.11 PRIMARY OSTEOARTHRITIS OF RIGHT KNEE: Primary | ICD-10-CM

## 2024-03-05 DIAGNOSIS — M70.61 GREATER TROCHANTERIC BURSITIS OF RIGHT HIP: ICD-10-CM

## 2024-03-05 DIAGNOSIS — M25.551 BILATERAL HIP PAIN: ICD-10-CM

## 2024-03-05 DIAGNOSIS — M25.552 BILATERAL HIP PAIN: ICD-10-CM

## 2024-03-05 DIAGNOSIS — M25.551 RIGHT HIP PAIN: ICD-10-CM

## 2024-03-05 PROCEDURE — 99999 PR PBB SHADOW E&M-EST. PATIENT-LVL III: CPT | Mod: PBBFAC,,, | Performed by: ORTHOPAEDIC SURGERY

## 2024-03-05 PROCEDURE — 1101F PT FALLS ASSESS-DOCD LE1/YR: CPT | Mod: CPTII,S$GLB,, | Performed by: ORTHOPAEDIC SURGERY

## 2024-03-05 PROCEDURE — 99204 OFFICE O/P NEW MOD 45 MIN: CPT | Mod: 25,S$GLB,, | Performed by: ORTHOPAEDIC SURGERY

## 2024-03-05 PROCEDURE — 3008F BODY MASS INDEX DOCD: CPT | Mod: CPTII,S$GLB,, | Performed by: ORTHOPAEDIC SURGERY

## 2024-03-05 PROCEDURE — 1157F ADVNC CARE PLAN IN RCRD: CPT | Mod: CPTII,S$GLB,, | Performed by: ORTHOPAEDIC SURGERY

## 2024-03-05 PROCEDURE — 1159F MED LIST DOCD IN RCRD: CPT | Mod: CPTII,S$GLB,, | Performed by: ORTHOPAEDIC SURGERY

## 2024-03-05 PROCEDURE — 3288F FALL RISK ASSESSMENT DOCD: CPT | Mod: CPTII,S$GLB,, | Performed by: ORTHOPAEDIC SURGERY

## 2024-03-05 PROCEDURE — 20610 DRAIN/INJ JOINT/BURSA W/O US: CPT | Mod: RT,S$GLB,, | Performed by: ORTHOPAEDIC SURGERY

## 2024-03-05 PROCEDURE — 1160F RVW MEDS BY RX/DR IN RCRD: CPT | Mod: CPTII,S$GLB,, | Performed by: ORTHOPAEDIC SURGERY

## 2024-03-05 PROCEDURE — 1125F AMNT PAIN NOTED PAIN PRSNT: CPT | Mod: CPTII,S$GLB,, | Performed by: ORTHOPAEDIC SURGERY

## 2024-03-05 PROCEDURE — 73502 X-RAY EXAM HIP UNI 2-3 VIEWS: CPT | Mod: 26,RT,, | Performed by: RADIOLOGY

## 2024-03-05 PROCEDURE — 73502 X-RAY EXAM HIP UNI 2-3 VIEWS: CPT | Mod: TC,PO,RT

## 2024-03-05 RX ADMIN — TRIAMCINOLONE ACETONIDE 40 MG: 40 INJECTION, SUSPENSION INTRA-ARTICULAR; INTRAMUSCULAR at 02:03

## 2024-03-14 RX ORDER — TRIAMCINOLONE ACETONIDE 40 MG/ML
40 INJECTION, SUSPENSION INTRA-ARTICULAR; INTRAMUSCULAR
Status: DISCONTINUED | OUTPATIENT
Start: 2024-03-05 | End: 2024-03-14 | Stop reason: HOSPADM

## 2024-03-14 NOTE — PROCEDURES
Large Joint Aspiration/Injection: R greater trochanteric bursa    Date/Time: 3/5/2024 2:30 PM    Performed by: Nav Brower MD  Authorized by: Nav Brower MD    Consent Done?:  Yes (Verbal)  Indications:  Pain  Timeout: prior to procedure the correct patient, procedure, and site was verified    Prep: patient was prepped and draped in usual sterile fashion      Local anesthesia used?: Yes    Local anesthetic:  Lidocaine 1% without epinephrine  Anesthetic total (ml):  5      Details:  Needle Size:  22 G  Approach:  Lateral  Location:  Hip  Site:  R greater trochanteric bursa  Medications:  40 mg triamcinolone acetonide 40 mg/mL  Patient tolerance:  Patient tolerated the procedure well with no immediate complications  Large Joint Aspiration/Injection: R knee    Date/Time: 3/5/2024 2:30 PM    Performed by: Nav Brower MD  Authorized by: Nav Brower MD    Consent Done?:  Yes (Verbal)  Indications:  Pain  Timeout: prior to procedure the correct patient, procedure, and site was verified    Prep: patient was prepped and draped in usual sterile fashion    Local anesthetic:  Lidocaine 1% without epinephrine  Anesthetic total (ml):  5      Details:  Needle Size:  21 G  Approach:  Anterolateral  Location:  Knee  Site:  R knee  Medications:  40 mg triamcinolone acetonide 40 mg/mL  Patient tolerance:  Patient tolerated the procedure well with no immediate complications

## 2024-03-14 NOTE — PROGRESS NOTES
Chief Complaint   Patient presents with    Right Hip - Pain    Right Knee - Pain         HPI:   This is a 68 y.o. who presents to clinic today complaining of right knee and hip pain for 2 months after no known trauma. Pain is progressively worsening. No numbness or tingling. No associated signs or symptoms.    Past Medical History:   Diagnosis Date    BRCA1 negative     BRCA2 negative     GERD (gastroesophageal reflux disease)     Hashimoto's thyroiditis 6/07    Hypothyroidism     Pancreatitis 1988,1995    Postmenopausal HRT (hormone replacement therapy)      Past Surgical History:   Procedure Laterality Date    CHOLECYSTECTOMY  2006    COLONOSCOPY  2012    every 5    COLONOSCOPY N/A 10/23/2017    Procedure: COLONOSCOPY;  Surgeon: Pankaj Shah MD;  Location: [facility] ENDO;  Service: Endoscopy;  Laterality: N/A;    COLONOSCOPY N/A 6/6/2023    Procedure: COLONOSCOPY;  Surgeon: Pankaj Shah MD;  Location: [facility] ENDO;  Service: Endoscopy;  Laterality: N/A;    ESOPHAGOGASTRODUODENOSCOPY      EYE SURGERY Bilateral     cataract    HYSTERECTOMY  1995    heavy periods    OOPHORECTOMY       Current Outpatient Medications on File Prior to Visit   Medication Sig Dispense Refill    BIOTIN ORAL Take by mouth Daily.      buPROPion (WELLBUTRIN XL) 300 MG 24 hr tablet TAKE 1 TABLET BY MOUTH EVERY DAY 90 tablet 3    buPROPion (WELLBUTRIN XL) 300 MG 24 hr tablet Take 1 tablet (300 mg total) by mouth once daily. 90 tablet 3    DOXYLAMINE SUCCINATE (UNISOM ORAL) Take 100 mg by mouth every evening. 2 tablets nightly      estradioL (VIVELLE-DOT) 0.075 mg/24 hr APPLY 1 PATCH TOPICALLY TO THE SKIN 2 TIMES A WEEK 24 patch 3    gabapentin (NEURONTIN) 300 MG capsule Take 1 capsule (300 mg total) by mouth 2 (two) times daily. 180 capsule 2    levothyroxine (SYNTHROID) 100 MCG tablet TAKE 1 TABLET(100 MCG) BY MOUTH EVERY DAY 90 tablet 0    levothyroxine (SYNTHROID) 100 MCG tablet Take 1 tablet (100 mcg total) by mouth once daily. 90  tablet 3    meloxicam (MOBIC) 15 MG tablet Take 1 tablet (15 mg total) by mouth once daily. 90 tablet 3     No current facility-administered medications on file prior to visit.     Review of patient's allergies indicates:  No Known Allergies  Family History   Problem Relation Age of Onset    Breast cancer Mother 38         at 39 years of age    Arthritis Father     Breast cancer Sister 69    Breast cancer Maternal Grandmother     Heart disease Neg Hx      Social History     Socioeconomic History    Marital status:    Tobacco Use    Smoking status: Former     Current packs/day: 0.00     Types: Cigarettes     Quit date: 1995     Years since quittin.5    Smokeless tobacco: Never   Substance and Sexual Activity    Alcohol use: Yes     Alcohol/week: 9.0 standard drinks of alcohol     Types: 1 Glasses of wine, 1 Shots of liquor, 7 Standard drinks or equivalent per week     Comment: 2-3 drinks per day    Drug use: No    Sexual activity: Yes     Partners: Male     Birth control/protection: Surgical       Review of Systems:  Constitutional:  Denies fever or chills   Eyes:  Denies change in visual acuity   HENT:  Denies nasal congestion or sore throat   Respiratory:  Denies cough or shortness of breath   Cardiovascular:  Denies chest pain or edema   GI:  Denies abdominal pain, nausea, vomiting, bloody stools or diarrhea   :  Denies dysuria   Integument:  Denies rash   Neurologic:  Denies headache, focal weakness or sensory changes   Endocrine:  Denies polyuria or polydipsia   Lymphatic:  Denies swollen glands   Psychiatric:  Denies depression or anxiety     Physical Exam:   Constitutional:  Well developed, well nourished, no acute distress, non-toxic appearance   Integument:  Well hydrated, no rash   Lymphatic:  No lymphadenopathy noted   Neurologic:  Alert & oriented x 3, CN 2-12 normal, normal motor function, normal sensory function, no focal deficits noted   Psychiatric:  Speech and behavior  appropriate   Eyes: EOMI  Gi: abdomen soft    Bilateral Knee Exam    right Knee Exam     Tenderness   The patient is experiencing tenderness in the medial joint line.    Range of Motion   Extension: abnormal   Flexion: abnormal     Muscle Strength     The patient has normal knee strength.    Tests   Jaspal:  Medial - positive   Lachman:  Anterior - negative      Varus: negative  Valgus: negative  Patellar Apprehension: negative    Other   Erythema: absent  Sensation: normal  Pulse: present  Swelling: mild      right Hip Exam   Tenderness   The patient is experiencing tenderness in the groin.     Range of Motion   The patient has decreased internal rotation  hip.    Muscle Strength   Flexion: 4/5     Other   Erythema: absent  Sensation: normal  Pulse: present    Primary osteoarthritis of right knee    Greater trochanteric bursitis of right hip            Using an aseptic technique, I injected 5 cc of lidocaine 1% without and 1 cc of kenalog 40mg into the right Hip and knee. The patient tolerated this well. I will have them return to clinic in 6 weeks.

## 2024-04-16 ENCOUNTER — OFFICE VISIT (OUTPATIENT)
Dept: ORTHOPEDICS | Facility: CLINIC | Age: 69
End: 2024-04-16
Payer: MEDICARE

## 2024-04-16 VITALS — BODY MASS INDEX: 21.92 KG/M2 | WEIGHT: 148 LBS | HEIGHT: 69 IN

## 2024-04-16 DIAGNOSIS — M70.61 GREATER TROCHANTERIC BURSITIS OF RIGHT HIP: Primary | ICD-10-CM

## 2024-04-16 PROCEDURE — 20610 DRAIN/INJ JOINT/BURSA W/O US: CPT | Mod: RT,S$GLB,, | Performed by: ORTHOPAEDIC SURGERY

## 2024-04-16 PROCEDURE — 99499 UNLISTED E&M SERVICE: CPT | Mod: S$GLB,,, | Performed by: ORTHOPAEDIC SURGERY

## 2024-04-16 PROCEDURE — 99999 PR PBB SHADOW E&M-EST. PATIENT-LVL III: CPT | Mod: PBBFAC,,, | Performed by: ORTHOPAEDIC SURGERY

## 2024-04-16 RX ADMIN — TRIAMCINOLONE ACETONIDE 40 MG: 40 INJECTION, SUSPENSION INTRA-ARTICULAR; INTRAMUSCULAR at 02:04

## 2024-04-17 ENCOUNTER — PATIENT MESSAGE (OUTPATIENT)
Dept: FAMILY MEDICINE | Facility: CLINIC | Age: 69
End: 2024-04-17
Payer: MEDICARE

## 2024-04-17 ENCOUNTER — PATIENT MESSAGE (OUTPATIENT)
Dept: ORTHOPEDICS | Facility: CLINIC | Age: 69
End: 2024-04-17
Payer: MEDICARE

## 2024-04-17 DIAGNOSIS — E03.9 HYPOTHYROIDISM, UNSPECIFIED TYPE: ICD-10-CM

## 2024-04-17 DIAGNOSIS — E78.5 HYPERLIPIDEMIA, UNSPECIFIED HYPERLIPIDEMIA TYPE: ICD-10-CM

## 2024-04-17 DIAGNOSIS — Z79.899 MEDICATION MANAGEMENT: ICD-10-CM

## 2024-04-17 DIAGNOSIS — Z00.00 ANNUAL PHYSICAL EXAM: Primary | ICD-10-CM

## 2024-04-17 RX ORDER — METHOCARBAMOL 750 MG/1
750 TABLET, FILM COATED ORAL 4 TIMES DAILY
Qty: 40 TABLET | Refills: 0 | Status: SHIPPED | OUTPATIENT
Start: 2024-04-17 | End: 2024-04-27

## 2024-04-23 RX ORDER — TRIAMCINOLONE ACETONIDE 40 MG/ML
40 INJECTION, SUSPENSION INTRA-ARTICULAR; INTRAMUSCULAR
Status: DISCONTINUED | OUTPATIENT
Start: 2024-04-16 | End: 2024-04-23 | Stop reason: HOSPADM

## 2024-04-23 NOTE — PROGRESS NOTES
Chief Complaint   Patient presents with    Right Hip - Pain    Right Knee - Pain      HPI:   This is a 68 y.o. who presents to clinic today for right hip pain for the past 2 weeks after no known trauma. Complains of pain while sleeping on side and when descending stairs. Pain is dull. No numbness or tingling. No associated signs or symptoms.      Past Medical History:   Diagnosis Date    BRCA1 negative     BRCA2 negative     GERD (gastroesophageal reflux disease)     Hashimoto's thyroiditis 6/07    Hypothyroidism     Pancreatitis 1988,1995    Postmenopausal HRT (hormone replacement therapy)      Past Surgical History:   Procedure Laterality Date    CHOLECYSTECTOMY  2006    COLONOSCOPY  2012    every 5    COLONOSCOPY N/A 10/23/2017    Procedure: COLONOSCOPY;  Surgeon: Pankaj Shah MD;  Location: [facility];  Service: Endoscopy;  Laterality: N/A;    COLONOSCOPY N/A 6/6/2023    Procedure: COLONOSCOPY;  Surgeon: Pankaj Shah MD;  Location: [facility];  Service: Endoscopy;  Laterality: N/A;    ESOPHAGOGASTRODUODENOSCOPY      EYE SURGERY Bilateral     cataract    HYSTERECTOMY  1995    heavy periods    OOPHORECTOMY       Current Outpatient Medications on File Prior to Visit   Medication Sig Dispense Refill    BIOTIN ORAL Take by mouth Daily.      buPROPion (WELLBUTRIN XL) 300 MG 24 hr tablet TAKE 1 TABLET BY MOUTH EVERY DAY 90 tablet 3    buPROPion (WELLBUTRIN XL) 300 MG 24 hr tablet Take 1 tablet (300 mg total) by mouth once daily. 90 tablet 3    DOXYLAMINE SUCCINATE (UNISOM ORAL) Take 100 mg by mouth every evening. 2 tablets nightly      estradioL (VIVELLE-DOT) 0.075 mg/24 hr APPLY 1 PATCH TOPICALLY TO THE SKIN 2 TIMES A WEEK 24 patch 3    gabapentin (NEURONTIN) 300 MG capsule Take 1 capsule (300 mg total) by mouth 2 (two) times daily. 180 capsule 2    levothyroxine (SYNTHROID) 100 MCG tablet TAKE 1 TABLET(100 MCG) BY MOUTH EVERY DAY 90 tablet 0    levothyroxine (SYNTHROID) 100 MCG tablet Take 1 tablet (100  mcg total) by mouth once daily. 90 tablet 3    meloxicam (MOBIC) 15 MG tablet Take 1 tablet (15 mg total) by mouth once daily. 90 tablet 3     No current facility-administered medications on file prior to visit.     Review of patient's allergies indicates:  No Known Allergies  Family History   Problem Relation Name Age of Onset    Breast cancer Mother  38         at 39 years of age    Arthritis Father      Breast cancer Sister  69    Breast cancer Maternal Grandmother      Heart disease Neg Hx       Social History     Socioeconomic History    Marital status:    Tobacco Use    Smoking status: Former     Current packs/day: 0.00     Types: Cigarettes     Quit date: 1995     Years since quittin.6    Smokeless tobacco: Never   Substance and Sexual Activity    Alcohol use: Yes     Alcohol/week: 9.0 standard drinks of alcohol     Types: 1 Glasses of wine, 1 Shots of liquor, 7 Standard drinks or equivalent per week     Comment: 2-3 drinks per day    Drug use: No    Sexual activity: Yes     Partners: Male     Birth control/protection: Surgical       Review of Systems:  Constitutional:  Denies fever or chills   Eyes:  Denies change in visual acuity   HENT:  Denies nasal congestion or sore throat   Respiratory:  Denies cough or shortness of breath   Cardiovascular:  Denies chest pain or edema   GI:  Denies abdominal pain, nausea, vomiting, bloody stools or diarrhea   :  Denies dysuria   Integument:  Denies rash   Neurologic:  Denies headache, focal weakness or sensory changes   Endocrine:  Denies polyuria or polydipsia   Lymphatic:  Denies swollen glands   Psychiatric:  Denies depression or anxiety     Physical Exam:   Constitutional:  Well developed, well nourished, no acute distress, non-toxic appearance   Integument:  Well hydrated, no rash   Lymphatic:  No lymphadenopathy noted   Neurologic:  Alert & oriented x 3  Psychiatric:  Speech and behavior appropriate     Bilateral Hip Exam    left Hip Exam    left hip exam performed same as contralateral hip and is normal.     right Hip Exam   Tenderness   The patient is experiencing tenderness in the greater trochanter.  Range of Motion   The patient has normal hip ROM.  Muscle Strength   Abduction: 4/5   Other   Erythema: absent  Sensation: normal  Pulse: present    Greater trochanteric bursitis of right hip           Using an aseptic technique, I injected 5 cc of lidocaine 1% without and 1 cc of kenalog 40mg into the right Hip. The patient tolerated this well. I will have them return to clinic in 6 weeks.

## 2024-04-23 NOTE — PROCEDURES
Large Joint Aspiration/Injection: R greater trochanteric bursa    Date/Time: 4/16/2024 2:00 PM    Performed by: Nav Brower MD  Authorized by: Nav Brower MD    Consent Done?:  Yes (Verbal)  Indications:  Pain  Timeout: prior to procedure the correct patient, procedure, and site was verified    Prep: patient was prepped and draped in usual sterile fashion      Local anesthesia used?: Yes    Local anesthetic:  Lidocaine 1% without epinephrine  Anesthetic total (ml):  5      Details:  Needle Size:  22 G  Approach:  Lateral  Location:  Hip  Site:  R greater trochanteric bursa  Medications:  40 mg triamcinolone acetonide 40 mg/mL  Patient tolerance:  Patient tolerated the procedure well with no immediate complications

## 2024-04-24 ENCOUNTER — LAB VISIT (OUTPATIENT)
Dept: LAB | Facility: HOSPITAL | Age: 69
End: 2024-04-24
Attending: NURSE PRACTITIONER
Payer: MEDICARE

## 2024-04-24 DIAGNOSIS — E03.9 HYPOTHYROIDISM, UNSPECIFIED TYPE: ICD-10-CM

## 2024-04-24 DIAGNOSIS — E78.5 HYPERLIPIDEMIA, UNSPECIFIED HYPERLIPIDEMIA TYPE: ICD-10-CM

## 2024-04-24 DIAGNOSIS — Z79.899 MEDICATION MANAGEMENT: ICD-10-CM

## 2024-04-24 LAB
ALBUMIN SERPL BCP-MCNC: 3.8 G/DL (ref 3.5–5.2)
ALP SERPL-CCNC: 43 U/L (ref 55–135)
ALT SERPL W/O P-5'-P-CCNC: 13 U/L (ref 10–44)
ANION GAP SERPL CALC-SCNC: 10 MMOL/L (ref 8–16)
AST SERPL-CCNC: 15 U/L (ref 10–40)
BASOPHILS # BLD AUTO: 0.04 K/UL (ref 0–0.2)
BASOPHILS NFR BLD: 0.6 % (ref 0–1.9)
BILIRUB SERPL-MCNC: 0.3 MG/DL (ref 0.1–1)
BUN SERPL-MCNC: 22 MG/DL (ref 8–23)
CALCIUM SERPL-MCNC: 9.4 MG/DL (ref 8.7–10.5)
CHLORIDE SERPL-SCNC: 106 MMOL/L (ref 95–110)
CHOLEST SERPL-MCNC: 255 MG/DL (ref 120–199)
CHOLEST/HDLC SERPL: 3.6 {RATIO} (ref 2–5)
CO2 SERPL-SCNC: 25 MMOL/L (ref 23–29)
CREAT SERPL-MCNC: 1 MG/DL (ref 0.5–1.4)
DIFFERENTIAL METHOD BLD: ABNORMAL
EOSINOPHIL # BLD AUTO: 0.1 K/UL (ref 0–0.5)
EOSINOPHIL NFR BLD: 1.4 % (ref 0–8)
ERYTHROCYTE [DISTWIDTH] IN BLOOD BY AUTOMATED COUNT: 13.3 % (ref 11.5–14.5)
EST. GFR  (NO RACE VARIABLE): >60 ML/MIN/1.73 M^2
ESTIMATED AVG GLUCOSE: 105 MG/DL (ref 68–131)
GLUCOSE SERPL-MCNC: 93 MG/DL (ref 70–110)
HBA1C MFR BLD: 5.3 % (ref 4–5.6)
HCT VFR BLD AUTO: 42 % (ref 37–48.5)
HDLC SERPL-MCNC: 71 MG/DL (ref 40–75)
HDLC SERPL: 27.8 % (ref 20–50)
HGB BLD-MCNC: 14.1 G/DL (ref 12–16)
IMM GRANULOCYTES # BLD AUTO: 0.04 K/UL (ref 0–0.04)
IMM GRANULOCYTES NFR BLD AUTO: 0.6 % (ref 0–0.5)
LDLC SERPL CALC-MCNC: 167.4 MG/DL (ref 63–159)
LYMPHOCYTES # BLD AUTO: 2.3 K/UL (ref 1–4.8)
LYMPHOCYTES NFR BLD: 32 % (ref 18–48)
MCH RBC QN AUTO: 33 PG (ref 27–31)
MCHC RBC AUTO-ENTMCNC: 33.6 G/DL (ref 32–36)
MCV RBC AUTO: 98 FL (ref 82–98)
MONOCYTES # BLD AUTO: 0.6 K/UL (ref 0.3–1)
MONOCYTES NFR BLD: 8.2 % (ref 4–15)
NEUTROPHILS # BLD AUTO: 4.2 K/UL (ref 1.8–7.7)
NEUTROPHILS NFR BLD: 57.2 % (ref 38–73)
NONHDLC SERPL-MCNC: 184 MG/DL
NRBC BLD-RTO: 0 /100 WBC
PLATELET # BLD AUTO: ABNORMAL K/UL (ref 150–450)
PLATELET BLD QL SMEAR: ABNORMAL
PMV BLD AUTO: ABNORMAL FL (ref 9.2–12.9)
POTASSIUM SERPL-SCNC: 5 MMOL/L (ref 3.5–5.1)
PROT SERPL-MCNC: 6.8 G/DL (ref 6–8.4)
RBC # BLD AUTO: 4.27 M/UL (ref 4–5.4)
SODIUM SERPL-SCNC: 141 MMOL/L (ref 136–145)
TRIGL SERPL-MCNC: 83 MG/DL (ref 30–150)
TSH SERPL DL<=0.005 MIU/L-ACNC: 2.62 UIU/ML (ref 0.4–4)
WBC # BLD AUTO: 7.23 K/UL (ref 3.9–12.7)

## 2024-04-24 PROCEDURE — 83036 HEMOGLOBIN GLYCOSYLATED A1C: CPT | Performed by: INTERNAL MEDICINE

## 2024-04-24 PROCEDURE — 36415 COLL VENOUS BLD VENIPUNCTURE: CPT | Mod: PO | Performed by: INTERNAL MEDICINE

## 2024-04-24 PROCEDURE — 80053 COMPREHEN METABOLIC PANEL: CPT | Performed by: INTERNAL MEDICINE

## 2024-04-24 PROCEDURE — 85025 COMPLETE CBC W/AUTO DIFF WBC: CPT | Mod: PO | Performed by: INTERNAL MEDICINE

## 2024-04-24 PROCEDURE — 80061 LIPID PANEL: CPT | Performed by: INTERNAL MEDICINE

## 2024-04-24 PROCEDURE — 84443 ASSAY THYROID STIM HORMONE: CPT | Performed by: INTERNAL MEDICINE

## 2024-04-25 ENCOUNTER — PATIENT MESSAGE (OUTPATIENT)
Dept: FAMILY MEDICINE | Facility: CLINIC | Age: 69
End: 2024-04-25
Payer: MEDICARE

## 2024-04-29 ENCOUNTER — OFFICE VISIT (OUTPATIENT)
Dept: FAMILY MEDICINE | Facility: CLINIC | Age: 69
End: 2024-04-29
Payer: MEDICARE

## 2024-04-29 VITALS
WEIGHT: 150.69 LBS | OXYGEN SATURATION: 97 % | DIASTOLIC BLOOD PRESSURE: 86 MMHG | SYSTOLIC BLOOD PRESSURE: 130 MMHG | HEIGHT: 69 IN | BODY MASS INDEX: 22.32 KG/M2 | RESPIRATION RATE: 16 BRPM | HEART RATE: 63 BPM | TEMPERATURE: 98 F

## 2024-04-29 DIAGNOSIS — Z00.00 WELL ADULT EXAM: Primary | ICD-10-CM

## 2024-04-29 DIAGNOSIS — E04.2 MULTINODULAR GOITER: ICD-10-CM

## 2024-04-29 DIAGNOSIS — E78.5 HYPERLIPIDEMIA, UNSPECIFIED HYPERLIPIDEMIA TYPE: ICD-10-CM

## 2024-04-29 DIAGNOSIS — E03.9 HYPOTHYROIDISM, UNSPECIFIED TYPE: ICD-10-CM

## 2024-04-29 DIAGNOSIS — F51.01 PRIMARY INSOMNIA: ICD-10-CM

## 2024-04-29 DIAGNOSIS — Z79.890 HORMONE REPLACEMENT THERAPY (HRT): ICD-10-CM

## 2024-04-29 DIAGNOSIS — M47.22 OSTEOARTHRITIS OF SPINE WITH RADICULOPATHY, CERVICAL REGION: ICD-10-CM

## 2024-04-29 DIAGNOSIS — F33.0 MAJOR DEPRESSIVE DISORDER, RECURRENT, MILD: ICD-10-CM

## 2024-04-29 DIAGNOSIS — M70.61 TROCHANTERIC BURSITIS OF RIGHT HIP: ICD-10-CM

## 2024-04-29 DIAGNOSIS — R03.0 ELEVATED BLOOD PRESSURE READING: ICD-10-CM

## 2024-04-29 DIAGNOSIS — Z13.6 ENCOUNTER FOR SCREENING FOR CARDIOVASCULAR DISORDERS: ICD-10-CM

## 2024-04-29 PROCEDURE — 3288F FALL RISK ASSESSMENT DOCD: CPT | Mod: CPTII,S$GLB,, | Performed by: INTERNAL MEDICINE

## 2024-04-29 PROCEDURE — 99397 PER PM REEVAL EST PAT 65+ YR: CPT | Mod: S$GLB,,, | Performed by: INTERNAL MEDICINE

## 2024-04-29 PROCEDURE — 1157F ADVNC CARE PLAN IN RCRD: CPT | Mod: CPTII,S$GLB,, | Performed by: INTERNAL MEDICINE

## 2024-04-29 PROCEDURE — 3008F BODY MASS INDEX DOCD: CPT | Mod: CPTII,S$GLB,, | Performed by: INTERNAL MEDICINE

## 2024-04-29 PROCEDURE — 3079F DIAST BP 80-89 MM HG: CPT | Mod: CPTII,S$GLB,, | Performed by: INTERNAL MEDICINE

## 2024-04-29 PROCEDURE — 1125F AMNT PAIN NOTED PAIN PRSNT: CPT | Mod: CPTII,S$GLB,, | Performed by: INTERNAL MEDICINE

## 2024-04-29 PROCEDURE — 1159F MED LIST DOCD IN RCRD: CPT | Mod: CPTII,S$GLB,, | Performed by: INTERNAL MEDICINE

## 2024-04-29 PROCEDURE — 3075F SYST BP GE 130 - 139MM HG: CPT | Mod: CPTII,S$GLB,, | Performed by: INTERNAL MEDICINE

## 2024-04-29 PROCEDURE — 1101F PT FALLS ASSESS-DOCD LE1/YR: CPT | Mod: CPTII,S$GLB,, | Performed by: INTERNAL MEDICINE

## 2024-04-29 PROCEDURE — 3044F HG A1C LEVEL LT 7.0%: CPT | Mod: CPTII,S$GLB,, | Performed by: INTERNAL MEDICINE

## 2024-04-29 PROCEDURE — 1160F RVW MEDS BY RX/DR IN RCRD: CPT | Mod: CPTII,S$GLB,, | Performed by: INTERNAL MEDICINE

## 2024-04-29 RX ORDER — ATORVASTATIN CALCIUM 10 MG/1
10 TABLET, FILM COATED ORAL DAILY
Qty: 90 TABLET | Refills: 3 | Status: SHIPPED | OUTPATIENT
Start: 2024-04-29 | End: 2024-05-17

## 2024-04-29 NOTE — PROGRESS NOTES
Subjective:       Patient ID: Katerina Dickson is a 68 y.o. female.    Medication List with Changes/Refills   New Medications    ATORVASTATIN (LIPITOR) 10 MG TABLET    Take 1 tablet (10 mg total) by mouth once daily.   Current Medications    BIOTIN ORAL    Take by mouth Daily.    BUPROPION (WELLBUTRIN XL) 300 MG 24 HR TABLET    Take 1 tablet (300 mg total) by mouth once daily.    DOXYLAMINE SUCCINATE (UNISOM ORAL)    Take 100 mg by mouth every evening. 2 tablets nightly    ESTRADIOL (VIVELLE-DOT) 0.075 MG/24 HR    APPLY 1 PATCH TOPICALLY TO THE SKIN 2 TIMES A WEEK    GABAPENTIN (NEURONTIN) 300 MG CAPSULE    Take 1 capsule (300 mg total) by mouth 2 (two) times daily.    LEVOTHYROXINE (SYNTHROID) 100 MCG TABLET    Take 1 tablet (100 mcg total) by mouth once daily.    MELOXICAM (MOBIC) 15 MG TABLET    Take 1 tablet (15 mg total) by mouth once daily.   Discontinued Medications    BUPROPION (WELLBUTRIN XL) 300 MG 24 HR TABLET    TAKE 1 TABLET BY MOUTH EVERY DAY    LEVOTHYROXINE (SYNTHROID) 100 MCG TABLET    TAKE 1 TABLET(100 MCG) BY MOUTH EVERY DAY       Chief Complaint: Annual Exam  She is here today to f/u on chronic medical issues.     She reports a couple BP readings in the 140s/80 at home. No chest pain or shortness of breath. No lower leg swelling.     She has hyperlipidemia and is not on treatment. Last lipids on 4/2024 were 255/83/71/167. She has no known CAD but has a strong family history of heart disease.  She is careful with her diet.    She has hypothyroid and is taking synthroid 100 mcg qday. Her last TSH was normal on  4/2024.  She had a thyroid u/s done on 5/2019 that was unchanged from previous year and no nodules that met criteria for bx. She does report increased fullness in her neck and would like to recheck her u/s.       She has GERD and is taking OTC prevacid 15 mg as needed. She feels this helps with her symptoms. She had her last EGD in 2016 that showed some gastric polyps but otherwise normal.        She has depression that has been controlled since 2005 on wellbutrin 300 mg qday. She feels this is helping with her symptoms. She denies anxiety. She denies suicidal ideations.     She has insomnia for many years and is now using unisom 100 mg qhs to sleep and melatonin 5 mg nightly. She was seen by sleep medicine who advised to continue unisom and limit melatonin to less than 5 mg nightly.        She is taking HRT patches since her RUI in 1995. She is managed by gyn.        She was noted to have very low platelets that have now normalized. She was followed by hematology who feel that the platelets were artificially low due to clumping in the vial before processing. Repeat evaluation was normal.      She has fullness in her right axilla and tingling down her arm. She has EMG that showed right cervical radiculopathy.  MRI of the brachial plexus was reassuring and MRI of the cervical spine showed degenerative changes most pronounced at C5-6 and result in severe right and moderate left neural foraminal narrowing and moderate spinal canal stenosis. Mild flattening the ventral cord surface at C5-6 and C6-7.  No definite cord signal abnormality.  She reports this has improved over time and she no longer has symptoms. She continues on mobic 15 mg daily and gabapentin 300 mg bid.     She complains of right hip pain. She was seen by orthopedics on 4/2024 and had steroid injection for trochanteric bursitis. She does feel this has helped with her hip pain but she continues to have discomfort.      She lives with her  and feels safe at home. She does exercise regularly with running and weights.  She eats healthy.  No weight loss.  No falls or balance issues.      Colonoscopy---6/2023 repeat in 5 years  Mammogram--1/2024 neg  DEXA-----1/2024 nl  Pap-----RUI  Tdap---3/2014  Prevnar 13----3/2022  Prevnar 20----2/2023  Influenza vaccine-----10/2023  Shingrex----12/2019, 5/2020   Covid vaccine---3 doses     RSV  "vaccine---none     Review of Systems   Constitutional:  Negative for appetite change, fatigue, fever and unexpected weight change.   HENT:  Negative for congestion, ear pain, hearing loss, sore throat and trouble swallowing.    Eyes:  Negative for pain and visual disturbance.   Respiratory:  Negative for cough, chest tightness, shortness of breath and wheezing.    Cardiovascular:  Negative for chest pain, palpitations and leg swelling.   Gastrointestinal:  Negative for abdominal pain, blood in stool, constipation, diarrhea, nausea and vomiting.   Endocrine: Negative for polyuria.   Genitourinary:  Negative for dysuria and hematuria.   Musculoskeletal:  Positive for arthralgias. Negative for back pain and myalgias.   Skin:  Negative for rash.   Neurological:  Negative for dizziness, weakness, numbness and headaches.   Hematological:  Does not bruise/bleed easily.   Psychiatric/Behavioral:  Positive for sleep disturbance. Negative for dysphoric mood and suicidal ideas. The patient is not nervous/anxious.        Objective:      Vitals:    04/29/24 0909   BP: 130/86   BP Location: Left arm   Patient Position: Sitting   BP Method: Large (Manual)   Pulse: 63   Resp: 16   Temp: 97.9 °F (36.6 °C)   SpO2: 97%   Weight: 68.3 kg (150 lb 11 oz)   Height: 5' 9" (1.753 m)     Body mass index is 22.25 kg/m².  Physical Exam    General appearance: No acute distress, cooperative  Eyes: PERRL, EOMI, conjunctiva clear  Ears: normal external ear and pinna, tm clear without drainage, canals clear  Nose: Normal mucosa without drainage  Throat: no exudates or erythema, tonsils not enlarged  Mouth: no sores or lesions, moist mucous membranes  Neck: FROM, soft, supple, no thyromegaly, no bruits  Lymph: no anterior or posterior cervical adenopathy  Heart::  Regular rate and rhythm, no murmur  Lung: Clear to ascultation bilaterally, no wheezing, no rales, no rhonchi, no distress  Abdomen: Soft, nontender, no distention, no hepatosplenomegaly, " bowel sounds normal, no guarding, no rebound, no peritoneal signs  Skin: no rashes, no lesions  Extremities: no edema, no cyanosis  Neuro: CN 2-12 intact, 5/5 muscle strength upper and lower extremity bilaterally, 2+ DTRs UE and LE bilaterally, normal gait  Peripheral pulses: 2+ pedal pulses bilaterally, good perfusion and color  Musculoskeletal: FROM, good strenth, no tenderness  Joint: normal appearance, no swelling, no warmth, no deformity in all joints    Assessment:       1. Well adult exam    2. Elevated blood pressure reading    3. Hyperlipidemia, unspecified hyperlipidemia type    4. Hypothyroidism, unspecified type    5. Multinodular goiter    6. Major depressive disorder, recurrent, mild    7. Primary insomnia    8. Hormone replacement therapy (HRT)    9. Osteoarthritis of spine with radiculopathy, cervical region    10. Trochanteric bursitis of right hip    11. Encounter for screening for cardiovascular disorders        Plan:       Well adult exam  Labs, mammogram, colonoscopy are UTD.  ADvised to get RSV vaccine.     Elevated blood pressure reading  ADvised to check BP daily and send readings in two weeks. If BP elevated then she will need treatment.    Hyperlipidemia, unspecified hyperlipidemia type  Uncontrolled and advised to start atorvastatin.  Will check CT calcium score.   -     atorvastatin (LIPITOR) 10 MG tablet; Take 1 tablet (10 mg total) by mouth once daily.  Dispense: 90 tablet; Refill: 3  -     Comprehensive Metabolic Panel; Future; Expected date: 04/29/2024  -     Lipid Panel; Future; Expected date: 04/29/2024  -     CT Cardiac Scoring; Future; Expected date: 04/29/2024    Hypothyroidism, unspecified type  Good control on this dose of levothyroxine    Multinodular goiter  Will recheck thyroid u/s  -     US Thyroid; Future; Expected date: 04/29/2024    Major depressive disorder, recurrent, mild  Good control on wellbutrin    Primary insomnia  STable on unisom and melatonin    Hormone  replacement therapy (HRT)  Continue replacement    Osteoarthritis of spine with radiculopathy, cervical region  Stable on mobic and gabapentin    Trochanteric bursitis of right hip  S/p injection but she continues with pain. Due to f/u with orthopedics.     Encounter for screening for cardiovascular disorders  -     CT Cardiac Scoring; Future; Expected date: 04/29/2024    Follow up in about 6 months (around 10/29/2024) for chronic medical issues.

## 2024-04-30 ENCOUNTER — HOSPITAL ENCOUNTER (OUTPATIENT)
Dept: RADIOLOGY | Facility: HOSPITAL | Age: 69
Discharge: HOME | End: 2024-04-30
Attending: INTERNAL MEDICINE
Payer: MEDICARE

## 2024-04-30 DIAGNOSIS — E04.2 MULTINODULAR GOITER: ICD-10-CM

## 2024-04-30 PROCEDURE — 76536 US EXAM OF HEAD AND NECK: CPT | Mod: TC,PO

## 2024-04-30 PROCEDURE — 76536 US EXAM OF HEAD AND NECK: CPT | Mod: 26,,, | Performed by: RADIOLOGY

## 2024-05-06 ENCOUNTER — PATIENT MESSAGE (OUTPATIENT)
Dept: FAMILY MEDICINE | Facility: CLINIC | Age: 69
End: 2024-05-06
Payer: MEDICARE

## 2024-05-15 ENCOUNTER — PATIENT MESSAGE (OUTPATIENT)
Dept: FAMILY MEDICINE | Facility: CLINIC | Age: 69
End: 2024-05-15
Payer: MEDICARE

## 2024-05-17 RX ORDER — ROSUVASTATIN CALCIUM 5 MG/1
5 TABLET, COATED ORAL DAILY
Qty: 90 TABLET | Refills: 3 | Status: SHIPPED | OUTPATIENT
Start: 2024-05-17 | End: 2025-05-17

## 2024-05-20 ENCOUNTER — OFFICE VISIT (OUTPATIENT)
Dept: DERMATOLOGY | Facility: CLINIC | Age: 69
End: 2024-05-20
Payer: MEDICARE

## 2024-05-20 ENCOUNTER — HOSPITAL ENCOUNTER (OUTPATIENT)
Dept: RADIOLOGY | Facility: HOSPITAL | Age: 69
Discharge: HOME | End: 2024-05-20
Attending: INTERNAL MEDICINE
Payer: MEDICARE

## 2024-05-20 DIAGNOSIS — Z13.6 ENCOUNTER FOR SCREENING FOR CARDIOVASCULAR DISORDERS: ICD-10-CM

## 2024-05-20 DIAGNOSIS — D48.5 NEOPLASM OF UNCERTAIN BEHAVIOR OF SKIN: Primary | ICD-10-CM

## 2024-05-20 DIAGNOSIS — E78.5 HYPERLIPIDEMIA, UNSPECIFIED HYPERLIPIDEMIA TYPE: ICD-10-CM

## 2024-05-20 PROCEDURE — 88305 TISSUE EXAM BY PATHOLOGIST: CPT | Mod: 26,,, | Performed by: PATHOLOGY

## 2024-05-20 PROCEDURE — 75571 CT HRT W/O DYE W/CA TEST: CPT | Mod: TC,PO

## 2024-05-20 PROCEDURE — 11102 TANGNTL BX SKIN SINGLE LES: CPT | Mod: S$GLB,,, | Performed by: STUDENT IN AN ORGANIZED HEALTH CARE EDUCATION/TRAINING PROGRAM

## 2024-05-20 PROCEDURE — 99499 UNLISTED E&M SERVICE: CPT | Mod: S$GLB,,, | Performed by: STUDENT IN AN ORGANIZED HEALTH CARE EDUCATION/TRAINING PROGRAM

## 2024-05-20 PROCEDURE — 88305 TISSUE EXAM BY PATHOLOGIST: CPT | Performed by: PATHOLOGY

## 2024-05-20 PROCEDURE — 75571 CT HRT W/O DYE W/CA TEST: CPT | Mod: 26,,, | Performed by: STUDENT IN AN ORGANIZED HEALTH CARE EDUCATION/TRAINING PROGRAM

## 2024-05-20 NOTE — PROGRESS NOTES
Subjective:      Patient ID:  Katerina Dickson is a 68 y.o. female who presents for   Chief Complaint   Patient presents with    Spot     Right side of nose     New Patient     Patient is coming in today with complaints of a spot on the right side of her nose that is not healing x's year. States that she has used neosporin, acne medication and medication to dry it out. States that it does bleed and it gets tender.     Derm hx   Denies Phx NMSC  Denies Fhx MM      Review of Systems   Constitutional:  Negative for fever, chills and fatigue.   Respiratory:  Negative for cough and shortness of breath.    Gastrointestinal:  Negative for nausea, vomiting and diarrhea.   Skin:  Negative for daily sunscreen use and activity-related sunscreen use.   Hematologic/Lymphatic: Does not bruise/bleed easily.       Objective:   Physical Exam   Constitutional: She appears well-developed and well-nourished.   Neurological: She is alert and oriented to person, place, and time.   Psychiatric: She has a normal mood and affect.   Skin:   Areas Examined (abnormalities noted in diagram):   Head / Face Inspection Performed            Diagram Legend     Erythematous scaling macule/papule c/w actinic keratosis       Vascular papule c/w angioma      Pigmented verrucoid papule/plaque c/w seborrheic keratosis      Yellow umbilicated papule c/w sebaceous hyperplasia      Irregularly shaped tan macule c/w lentigo     1-2 mm smooth white papules consistent with Milia      Movable subcutaneous cyst with punctum c/w epidermal inclusion cyst      Subcutaneous movable cyst c/w pilar cyst      Firm pink to brown papule c/w dermatofibroma      Pedunculated fleshy papule(s) c/w skin tag(s)      Evenly pigmented macule c/w junctional nevus     Mildly variegated pigmented, slightly irregular-bordered macule c/w mildly atypical nevus      Flesh colored to evenly pigmented papule c/w intradermal nevus       Pink pearly papule/plaque c/w basal cell carcinoma       Erythematous hyperkeratotic cursted plaque c/w SCC      Surgical scar with no sign of skin cancer recurrence      Open and closed comedones      Inflammatory papules and pustules      Verrucoid papule consistent consistent with wart     Erythematous eczematous patches and plaques     Dystrophic onycholytic nail with subungual debris c/w onychomycosis     Umbilicated papule    Erythematous-base heme-crusted tan verrucoid plaque consistent with inflamed seborrheic keratosis     Erythematous Silvery Scaling Plaque c/w Psoriasis     See annotation      Assessment / Plan:      Pathology Orders:       Normal Orders This Visit    Specimen to Pathology, Dermatology     Questions:    Procedure Type: Dermatology and skin neoplasms    Number of Specimens: 1    ------------------------: -------------------------    Spec 1 Procedure: Biopsy    Spec 1 Clinical Impression: BCC    Spec 1 Source: right nasal ala    Release to patient:           Neoplasm of uncertain behavior of skin  -     Specimen to Pathology, Dermatology  Shave biopsy procedure note:    Shave biopsy performed after verbal consent including risk of infection, scar, recurrence, need for additional treatment of site. Area prepped with alcohol, anesthetized with approximately 1.0cc of 1% lidocaine with epinephrine. Lesional tissue shaved with razor blade. Hemostasis achieved with application of aluminum chloride followed by hyfrecation. No complications. Dressing applied. Wound care explained.             No follow-ups on file.

## 2024-05-20 NOTE — PATIENT INSTRUCTIONS
Shave Biopsy Wound Care    Your doctor has performed a shave biopsy today.  A band aid and vaseline ointment has been placed over the site.  This should remain in place for 24 hours.  It is recommended that you keep the area dry for the first 24 hours.  After 24 hours, you may remove the band aid and wash the area with warm soap and water and apply Vaseline jelly.  Many patients prefer to use Neosporin or Bacitracin ointment.  This is acceptable; however, know that you can develop an allergy to this medication even if you have used it safely for years.  It is important to keep the area moist.  Letting it dry out and get air slows healing time, and will worsen the scar.  Band aid is optional after first 24 hours.      If you notice increasing redness, tenderness, pain, or yellow drainage at the biopsy site, please notify your doctor.  These are signs of an infection.    If your biopsy site is bleeding, apply firm pressure for 15 minutes straight.  Repeat for another 15 minutes, if it is still bleeding.   If the surgical site continues to bleed, then please contact your doctor.      [facility] [place] [address]/ [phone] FAX/ [email or web address]

## 2024-05-22 ENCOUNTER — TELEPHONE (OUTPATIENT)
Dept: DERMATOLOGY | Facility: CLINIC | Age: 69
End: 2024-05-22
Payer: MEDICARE

## 2024-05-22 ENCOUNTER — PATIENT MESSAGE (OUTPATIENT)
Dept: FAMILY MEDICINE | Facility: CLINIC | Age: 69
End: 2024-05-22
Payer: MEDICARE

## 2024-05-22 DIAGNOSIS — C44.311 BASAL CELL CARCINOMA (BCC) OF RIGHT SIDE OF NOSE: Primary | ICD-10-CM

## 2024-05-22 LAB
FINAL PATHOLOGIC DIAGNOSIS: NORMAL
GROSS: NORMAL
Lab: NORMAL
MICROSCOPIC EXAM: NORMAL

## 2024-05-28 ENCOUNTER — OFFICE VISIT (OUTPATIENT)
Dept: ORTHOPEDICS | Facility: CLINIC | Age: 69
End: 2024-05-28
Payer: MEDICARE

## 2024-05-28 DIAGNOSIS — M70.61 GREATER TROCHANTERIC BURSITIS OF RIGHT HIP: Primary | ICD-10-CM

## 2024-05-28 DIAGNOSIS — M17.11 PRIMARY OSTEOARTHRITIS OF RIGHT KNEE: ICD-10-CM

## 2024-05-28 PROCEDURE — 99214 OFFICE O/P EST MOD 30 MIN: CPT | Mod: 25,S$GLB,, | Performed by: ORTHOPAEDIC SURGERY

## 2024-05-28 PROCEDURE — 20610 DRAIN/INJ JOINT/BURSA W/O US: CPT | Mod: RT,S$GLB,, | Performed by: ORTHOPAEDIC SURGERY

## 2024-05-28 PROCEDURE — 1159F MED LIST DOCD IN RCRD: CPT | Mod: CPTII,S$GLB,, | Performed by: ORTHOPAEDIC SURGERY

## 2024-05-28 PROCEDURE — 3044F HG A1C LEVEL LT 7.0%: CPT | Mod: CPTII,S$GLB,, | Performed by: ORTHOPAEDIC SURGERY

## 2024-05-28 PROCEDURE — 99999 PR PBB SHADOW E&M-EST. PATIENT-LVL II: CPT | Mod: PBBFAC,,, | Performed by: ORTHOPAEDIC SURGERY

## 2024-05-28 PROCEDURE — 1160F RVW MEDS BY RX/DR IN RCRD: CPT | Mod: CPTII,S$GLB,, | Performed by: ORTHOPAEDIC SURGERY

## 2024-05-28 PROCEDURE — 1125F AMNT PAIN NOTED PAIN PRSNT: CPT | Mod: CPTII,S$GLB,, | Performed by: ORTHOPAEDIC SURGERY

## 2024-05-28 PROCEDURE — 3288F FALL RISK ASSESSMENT DOCD: CPT | Mod: CPTII,S$GLB,, | Performed by: ORTHOPAEDIC SURGERY

## 2024-05-28 PROCEDURE — 1157F ADVNC CARE PLAN IN RCRD: CPT | Mod: CPTII,S$GLB,, | Performed by: ORTHOPAEDIC SURGERY

## 2024-05-28 PROCEDURE — 1101F PT FALLS ASSESS-DOCD LE1/YR: CPT | Mod: CPTII,S$GLB,, | Performed by: ORTHOPAEDIC SURGERY

## 2024-05-28 RX ADMIN — TRIAMCINOLONE ACETONIDE 40 MG: 40 INJECTION, SUSPENSION INTRA-ARTICULAR; INTRAMUSCULAR at 01:05

## 2024-05-30 ENCOUNTER — PROCEDURE VISIT (OUTPATIENT)
Dept: DERMATOLOGY | Facility: CLINIC | Age: 69
End: 2024-05-30
Payer: MEDICARE

## 2024-05-30 VITALS
WEIGHT: 140.63 LBS | DIASTOLIC BLOOD PRESSURE: 78 MMHG | HEIGHT: 69 IN | SYSTOLIC BLOOD PRESSURE: 132 MMHG | BODY MASS INDEX: 20.83 KG/M2 | HEART RATE: 74 BPM

## 2024-05-30 DIAGNOSIS — C44.311 BASAL CELL CARCINOMA (BCC) OF RIGHT SIDE OF NOSE: ICD-10-CM

## 2024-05-30 PROCEDURE — 17311 MOHS 1 STAGE H/N/HF/G: CPT | Mod: 51,S$GLB,, | Performed by: DERMATOLOGY

## 2024-05-30 PROCEDURE — 14060 TIS TRNFR E/N/E/L 10 SQ CM/<: CPT | Mod: S$GLB,,, | Performed by: DERMATOLOGY

## 2024-05-30 PROCEDURE — 17312 MOHS ADDL STAGE: CPT | Mod: S$GLB,,, | Performed by: DERMATOLOGY

## 2024-05-30 RX ORDER — DOXYCYCLINE 100 MG/1
100 CAPSULE ORAL 2 TIMES DAILY
Qty: 14 CAPSULE | Refills: 0 | Status: SHIPPED | OUTPATIENT
Start: 2024-05-30 | End: 2024-06-06

## 2024-05-30 NOTE — PROGRESS NOTES
MOHS MICROGRAPHIC SURGERY OPERATIVE NOTE  Name:  Katerina Dickson  Date: 2024  Patient : 1955  Attending Surgeon: Daria Hedrick MD  Assistants: Madisyn Figueroa - Surgical Technician  Anesthetic Agent: 1% lidocaine with 1:100,000 epinephrine  Clinical Diagnosis: basal cell carcinoma nodular and superficial  Operation: Mohs Micrographic Surgery  Location: right nasal ala  Indications: Location in mask areas of face including central face, nose, eyelids, eyebrows, lips, chin, preauricular, temple, and ear.  Surgical Preparation: povidone-iodine     Description of Operation:  The nature and purpose of the procedure, associated risks and alternative treatments were explained to the patient in detail. All patient questions were answered completely. An informed operative consent and photography permit were obtained. The tumor location was then identified and marked with agreement by the patient of the correct location. The patient was positioned, prepped, and draped in the usual sterile manner. Local anesthesia was obtained with 2 cc(s) of 1% lidocaine with 1:100,000 epinephrine. The lesion pre-operatively measures 0.6 by 0.4 cm.     1ST STAGE:  A 2+ mm rim of normal appearing skin was marked circumferentially around the lesion after scraping with a curette to define the margin. The area thus outlined was excised at a 45 degree angle. Hemostasis was obtained with electrodesiccation. The specimen was oriented, mapped, and subdivided into at least two sections. Sections were then chromacoded and submitted for horizontal frozen sections. The patient tolerated the procedure well and there were no complications. Upon microscopic examination of the processed horizontal frozen sections of this stage:  Tumor was present at the margin of the specimen; these areas of residual tumor were marked on the reference map with red pencil, pinpointing the location in which further tissue excision was necessary.  Tumor type  noted on stage 1: Nodular basal cell carcinoma: Nodular tumor in dermis composed of basaloid cells exhibiting peripheral palisading and retraction artifact.  Infiltrative basal cell carcinoma: Basaloid tumor in dermis characterized by thin elongated strands of basaloid cells infiltrating between dermal collagen bundles.     SUBSEQUENT STAGES:  The patient returned to the operating room for additional stages of tumor removal, and prepped in the manner described above. Surgery was directed to the areas having residual tumor, with thin layers of tissue being excised from these regions. A new reference map was prepared during the surgery to maintain precise orientation as described above. Hemostasis was achieved and the excised tissue was processed for microscopic analysis.  These sections were then examined by the Mohs surgeon, and areas of persistent tumor were indicated on the reference map. This process was repeated until the frozen horizontal sections of STAGE 3 revealed no further tumor cells and tumor eradication was considered to be  complete.  Tumor type noted on subsequent stages: Same as in first stage.     SUMMARY:  The tumor was extirpated in 3 Mohs Stages resulting in a final defect measuring 1.1 by 0.8 cm².   The final defect extended deep to SMAS  The patient tolerated the procedure well and no complications were noted.     PHOTOS:        Daria Hedrick MD  FLAP CLOSURE OF MOHS DEFECT OPERATIVE REPORT  Patient Name: Katerina Dickson  Patient YOB: 1955  Date of procedure: 5/30/2024  Attending Surgeon: Daria Hedrick MD FAAD  Anesthetic Agent: 1% lidocaine with 1:100,000 epinephrine   Assistant: Madisyn Figueroa - Surgical Technician  Clinical Diagnosis: A 1.1 x 0.8 cm² defect after Mohs Micrographic Surgery  Location: right nasal ala  Operation:  Rotation flap   Surgical Preparation: povidone-iodine    Description of Operation:  The nature and purpose of the procedure, associated  risks and alternative treatments were explained to the patient in detail. All patient questions were answered completely. In order to minimize tension on the closure and avoid compromising the anatomic contour of the anatomic region and maximize functional capacity, the above noted adjacent tissue transfer was performed.    An informed operative consent and photography permit were obtained. The patient was positioned, prepped, and draped in the usual sterile manner. Local anesthesia was obtained with 6 cc(s) of 1% lidocaine with 1:100,000 epinephrine The defect edges were debeveled with a #15 blade. Using gentian violet, the flap was designed adjacent to the defect including all anticipated dog ears. The area thus outlined was incised deep to SMAS with the scalpel. This resulted in a final undermined and elevated flap defect measuring 2.6 x 1.9 cm².   The flap and skin margins were then undermined 50 to 75% of the defects diameter in all directions utilizing supercut iris scissors. Hemostasis was achieved with electrodessication. The secondary defect was closed first utilizing 5-0 Monocryl interrupted buried subcutaneous vertical mattress sutures.     The adjacent tissue flap was then mobilized into place and anchored with additional 5-0 Monocryl interrupted buried subcutaneous vertical mattress sutures. The flap and recipient sites were sculpted in the adipose and dermal planes for a good fit. The skin edges were then reapposed with 6-0 Prolene. Redundant tissue was noted at the inferior and superior aspect of the adjacent tissue transfer. Burows triangles were removed utilizing a #15 blade and the epidermal edges reapposed with 6-0 Prolene. This repair resulted in excellent contour with normal symmetry. The patient tolerated the procedure well and there were no complications.   Polysporin, non-adherent gauze and a pressure dressing were applied to wound after gentle cleansing with saline.    Post op meds:  Doxycycline 100 BID x 7 days     Photos:        Daria Hedrick MD FAAD

## 2024-06-06 ENCOUNTER — CLINICAL SUPPORT (OUTPATIENT)
Dept: DERMATOLOGY | Facility: CLINIC | Age: 69
End: 2024-06-06
Payer: MEDICARE

## 2024-06-06 DIAGNOSIS — Z48.02 VISIT FOR SUTURE REMOVAL: ICD-10-CM

## 2024-06-06 DIAGNOSIS — L71.9 ACNE ROSACEA: Primary | ICD-10-CM

## 2024-06-06 PROCEDURE — 99212 OFFICE O/P EST SF 10 MIN: CPT | Mod: 24,S$GLB,, | Performed by: DERMATOLOGY

## 2024-06-06 RX ORDER — TRIAMCINOLONE ACETONIDE 40 MG/ML
40 INJECTION, SUSPENSION INTRA-ARTICULAR; INTRAMUSCULAR
Status: DISCONTINUED | OUTPATIENT
Start: 2024-05-28 | End: 2024-06-06 | Stop reason: HOSPADM

## 2024-06-06 NOTE — PROGRESS NOTES
Dermatology Outpatient Clinic Progress Note    Patient Name: Katerina Dickson  Patient : 1955  MRN: [id number]  Date of Service: 2024    CC/HPI: Katerina Dickson is a 68 y.o. year old female with history of basal cell carcinoma who presents today for suture removal.  Patient reports redness of nose and cheeks    ROS:   Dermatological ROS: positive for rash    Physical Exam   Head   Head comments: Erythema and telangiectasia sparing the periorificial area          Diagram Legend     Erythematous scaling macule/papule c/w actinic keratosis       Vascular papule c/w angioma      Pigmented verrucoid papule/plaque c/w seborrheic keratosis      Yellow umbilicated papule c/w sebaceous hyperplasia      Irregularly shaped tan macule c/w lentigo     1-2 mm smooth white papules consistent with Milia      Movable subcutaneous cyst with punctum c/w epidermal inclusion cyst      Subcutaneous movable cyst c/w pilar cyst      Firm pink to brown papule c/w dermatofibroma      Pedunculated fleshy papule(s) c/w skin tag(s)      Evenly pigmented macule c/w junctional nevus     Mildly variegated pigmented, slightly irregular-bordered macule c/w mildly atypical nevus      Flesh colored to evenly pigmented papule c/w intradermal nevus       Pink pearly papule/plaque c/w basal cell carcinoma      Erythematous hyperkeratotic cursted plaque c/w SCC      Surgical scar with no sign of skin cancer recurrence      Open and closed comedones      Inflammatory papules and pustules      Verrucoid papule consistent consistent with wart     Erythematous eczematous patches and plaques     Dystrophic onycholytic nail with subungual debris c/w onychomycosis     Umbilicated papule    Erythematous-base heme-crusted tan verrucoid plaque consistent with inflamed seborrheic keratosis     Erythematous Silvery Scaling Plaque c/w Psoriasis     See annotation    Assessment/Plan:    Diagnoses and all orders for this visit:    rosacea - ET rosacea  - Trial of  triple cream  - if not improving, laser would be best treatment    Visit for suture removal  - suture removal today  - follow up ~ 6 weeks     Follow up in 6 weeks        MD ISMAEL ZavaletaD

## 2024-06-06 NOTE — PROGRESS NOTES
Chief Complaint   Patient presents with    Right Knee - Pain    Right Hip - Pain         HPI:   This is a 68 y.o. who presents to clinic today complaining of right knee and hip pain for 2 weeks after no known trauma. Pain is progressively worsening. No numbness or tingling. No associated signs or symptoms.    Past Medical History:   Diagnosis Date    BRCA1 negative     BRCA2 negative     GERD (gastroesophageal reflux disease)     Hashimoto's thyroiditis 06/2007    Hypothyroidism     Pancreatitis 1988,1995    Postmenopausal HRT (hormone replacement therapy)      Past Surgical History:   Procedure Laterality Date    CHOLECYSTECTOMY  2006    COLONOSCOPY  2012    every 5    COLONOSCOPY N/A 10/23/2017    Procedure: COLONOSCOPY;  Surgeon: Pankaj Shah MD;  Location: [facility] ENDO;  Service: Endoscopy;  Laterality: N/A;    COLONOSCOPY N/A 6/6/2023    Procedure: COLONOSCOPY;  Surgeon: Pankaj Shah MD;  Location: [facility] ENDO;  Service: Endoscopy;  Laterality: N/A;    ESOPHAGOGASTRODUODENOSCOPY      EYE SURGERY Bilateral     cataract    HYSTERECTOMY  1995    heavy periods    OOPHORECTOMY       Current Outpatient Medications on File Prior to Visit   Medication Sig Dispense Refill    BIOTIN ORAL Take by mouth Daily.      buPROPion (WELLBUTRIN XL) 300 MG 24 hr tablet Take 1 tablet (300 mg total) by mouth once daily. 90 tablet 3    DOXYLAMINE SUCCINATE (UNISOM ORAL) Take 100 mg by mouth every evening. 2 tablets nightly      estradioL (VIVELLE-DOT) 0.075 mg/24 hr APPLY 1 PATCH TOPICALLY TO THE SKIN 2 TIMES A WEEK 24 patch 3    gabapentin (NEURONTIN) 300 MG capsule Take 1 capsule (300 mg total) by mouth 2 (two) times daily. 180 capsule 2    levothyroxine (SYNTHROID) 100 MCG tablet Take 1 tablet (100 mcg total) by mouth once daily. 90 tablet 3    meloxicam (MOBIC) 15 MG tablet Take 1 tablet (15 mg total) by mouth once daily. 90 tablet 3    rosuvastatin (CRESTOR) 5 MG tablet Take 1 tablet (5 mg total) by mouth once daily. 90  tablet 3     No current facility-administered medications on file prior to visit.     Review of patient's allergies indicates:  No Known Allergies  Family History   Problem Relation Name Age of Onset    Breast cancer Mother  38         at 39 years of age    Arthritis Father      Breast cancer Sister  69    Breast cancer Maternal Grandmother      Heart disease Neg Hx       Social History     Socioeconomic History    Marital status:    Tobacco Use    Smoking status: Former     Current packs/day: 0.00     Types: Cigarettes     Quit date: 1995     Years since quittin.8    Smokeless tobacco: Never   Substance and Sexual Activity    Alcohol use: Yes     Alcohol/week: 9.0 standard drinks of alcohol     Types: 1 Glasses of wine, 1 Shots of liquor, 7 Standard drinks or equivalent per week     Comment: 2-3 drinks per day    Drug use: No    Sexual activity: Yes     Partners: Male     Birth control/protection: Surgical     Social Determinants of Health     Financial Resource Strain: Patient Declined (2024)    Overall Financial Resource Strain (CARDIA)     Difficulty of Paying Living Expenses: Patient declined   Food Insecurity: Patient Declined (2024)    Hunger Vital Sign     Worried About Running Out of Food in the Last Year: Patient declined     Ran Out of Food in the Last Year: Patient declined   Physical Activity: Unknown (2024)    Exercise Vital Sign     Days of Exercise per Week: Patient declined   Housing Stability: Unknown (2024)    Housing Stability Vital Sign     Unable to Pay for Housing in the Last Year: Patient declined       Review of Systems:  Constitutional:  Denies fever or chills   Eyes:  Denies change in visual acuity   HENT:  Denies nasal congestion or sore throat   Respiratory:  Denies cough or shortness of breath   Cardiovascular:  Denies chest pain or edema   GI:  Denies abdominal pain, nausea, vomiting, bloody stools or diarrhea   :  Denies dysuria   Integument:   Denies rash   Neurologic:  Denies headache, focal weakness or sensory changes   Endocrine:  Denies polyuria or polydipsia   Lymphatic:  Denies swollen glands   Psychiatric:  Denies depression or anxiety     Physical Exam:   Constitutional:  Well developed, well nourished, no acute distress, non-toxic appearance   Integument:  Well hydrated, no rash   Lymphatic:  No lymphadenopathy noted   Neurologic:  Alert & oriented x 3, CN 2-12 normal, normal motor function, normal sensory function, no focal deficits noted   Psychiatric:  Speech and behavior appropriate   Eyes: EOMI  Gi: abdomen soft    Bilateral Knee Exam    right Knee Exam     Tenderness   The patient is experiencing tenderness in the medial joint line.    Range of Motion   Extension: abnormal   Flexion: abnormal     Muscle Strength     The patient has normal knee strength.    Tests   Jaspal:  Medial - positive   Lachman:  Anterior - negative      Varus: negative  Valgus: negative  Patellar Apprehension: negative    Other   Erythema: absent  Sensation: normal  Pulse: present  Swelling: mild      left Knee Exam   left knee exam performed same as contralateral side and is normal.      Bilateral Hip Exam Performed    right Hip Exam     Tenderness   The patient is experiencing tenderness in the greater trochanter.    Range of Motion   The patient has normal hip ROM.    Muscle Strength   Abduction: 4/5     Other   Erythema: absent  Sensation: normal  Pulse: present    left Hip Exam   Hip exam performed same as contralateral hip and is normal.     Greater trochanteric bursitis of right hip    Primary osteoarthritis of right knee            Using an aseptic technique, I injected 5 cc of lidocaine 1% without and 1 cc of kenalog 40mg into the right Hip and knee. The patient tolerated this well. I will have them return to clinic in 6 weeks.

## 2024-06-06 NOTE — PROCEDURES
Large Joint Aspiration/Injection: R knee    Date/Time: 5/28/2024 1:15 PM    Performed by: Nav Brower MD  Authorized by: Nav Brower MD    Consent Done?:  Yes (Verbal)  Indications:  Pain  Timeout: prior to procedure the correct patient, procedure, and site was verified    Prep: patient was prepped and draped in usual sterile fashion    Local anesthetic:  Lidocaine 1% without epinephrine  Anesthetic total (ml):  5      Details:  Needle Size:  21 G  Approach:  Anterolateral  Location:  Knee  Site:  R knee  Medications:  40 mg triamcinolone acetonide 40 mg/mL  Patient tolerance:  Patient tolerated the procedure well with no immediate complications  Large Joint Aspiration/Injection: R greater trochanteric bursa    Date/Time: 5/28/2024 1:15 PM    Performed by: Nav Brower MD  Authorized by: Nav Brower MD    Consent Done?:  Yes (Verbal)  Indications:  Pain  Timeout: prior to procedure the correct patient, procedure, and site was verified    Prep: patient was prepped and draped in usual sterile fashion      Local anesthesia used?: Yes    Local anesthetic:  Lidocaine 1% without epinephrine  Anesthetic total (ml):  5      Details:  Needle Size:  22 G  Approach:  Lateral  Location:  Hip  Site:  R greater trochanteric bursa  Medications:  40 mg triamcinolone acetonide 40 mg/mL  Patient tolerance:  Patient tolerated the procedure well with no immediate complications

## 2024-06-15 DIAGNOSIS — M47.22 OSTEOARTHRITIS OF SPINE WITH RADICULOPATHY, CERVICAL REGION: ICD-10-CM

## 2024-06-15 NOTE — TELEPHONE ENCOUNTER
No care due was identified.  [facility] Embedded Care Due Messages. Reference number: [id number].   6/15/2024 11:23:03 AM CDT

## 2024-06-18 DIAGNOSIS — M47.22 OSTEOARTHRITIS OF SPINE WITH RADICULOPATHY, CERVICAL REGION: ICD-10-CM

## 2024-06-18 RX ORDER — GABAPENTIN 300 MG/1
300 CAPSULE ORAL 2 TIMES DAILY
Qty: 180 CAPSULE | Refills: 2 | Status: SHIPPED | OUTPATIENT
Start: 2024-06-18

## 2024-06-18 RX ORDER — GABAPENTIN 300 MG/1
300 CAPSULE ORAL 2 TIMES DAILY
Qty: 180 CAPSULE | Refills: 2 | OUTPATIENT
Start: 2024-06-18

## 2024-06-18 NOTE — TELEPHONE ENCOUNTER
No care due was identified.  [facility] Embedded Care Due Messages. Reference number: [id number].   6/18/2024 2:32:07 PM CDT

## 2024-06-22 ENCOUNTER — PATIENT MESSAGE (OUTPATIENT)
Dept: FAMILY MEDICINE | Facility: CLINIC | Age: 69
End: 2024-06-22
Payer: MEDICARE

## 2024-06-24 DIAGNOSIS — M47.22 OSTEOARTHRITIS OF SPINE WITH RADICULOPATHY, CERVICAL REGION: ICD-10-CM

## 2024-06-24 RX ORDER — GABAPENTIN 300 MG/1
300 CAPSULE ORAL 2 TIMES DAILY
Qty: 180 CAPSULE | Refills: 2 | OUTPATIENT
Start: 2024-06-24

## 2024-06-24 NOTE — TELEPHONE ENCOUNTER
No care due was identified.  [facility] Embedded Care Due Messages. Reference number: [id number].   6/24/2024 10:20:21 AM CDT

## 2024-07-18 ENCOUNTER — OFFICE VISIT (OUTPATIENT)
Dept: DERMATOLOGY | Facility: CLINIC | Age: 69
End: 2024-07-18
Payer: MEDICARE

## 2024-07-18 DIAGNOSIS — C44.311 BASAL CELL CARCINOMA OF NOSE: Primary | ICD-10-CM

## 2024-07-18 PROCEDURE — 1157F ADVNC CARE PLAN IN RCRD: CPT | Mod: CPTII,S$GLB,, | Performed by: DERMATOLOGY

## 2024-07-18 PROCEDURE — 3044F HG A1C LEVEL LT 7.0%: CPT | Mod: CPTII,S$GLB,, | Performed by: DERMATOLOGY

## 2024-07-18 PROCEDURE — 99024 POSTOP FOLLOW-UP VISIT: CPT | Mod: S$GLB,,, | Performed by: DERMATOLOGY

## 2024-07-18 NOTE — PROGRESS NOTES
Dermatology Outpatient Clinic Progress Note    Patient Name: Katerina Dickson  Patient : 1955  MRN: [id number]  Date of Service: 2024    CC/HPI: Katerina Dickson is a 68 y.o. year old female with history of basal cell carcinoma who presents today for 6 week follow up post Mohs right nasal ala.  Patient reports bump along scar that has not resolved.      Physical Exam   Head           Diagram Legend     Erythematous scaling macule/papule c/w actinic keratosis       Vascular papule c/w angioma      Pigmented verrucoid papule/plaque c/w seborrheic keratosis      Yellow umbilicated papule c/w sebaceous hyperplasia      Irregularly shaped tan macule c/w lentigo     1-2 mm smooth white papules consistent with Milia      Movable subcutaneous cyst with punctum c/w epidermal inclusion cyst      Subcutaneous movable cyst c/w pilar cyst      Firm pink to brown papule c/w dermatofibroma      Pedunculated fleshy papule(s) c/w skin tag(s)      Evenly pigmented macule c/w junctional nevus     Mildly variegated pigmented, slightly irregular-bordered macule c/w mildly atypical nevus      Flesh colored to evenly pigmented papule c/w intradermal nevus       Pink pearly papule/plaque c/w basal cell carcinoma      Erythematous hyperkeratotic cursted plaque c/w SCC      Surgical scar with no sign of skin cancer recurrence      Open and closed comedones      Inflammatory papules and pustules      Verrucoid papule consistent consistent with wart     Erythematous eczematous patches and plaques     Dystrophic onycholytic nail with subungual debris c/w onychomycosis     Umbilicated papule    Erythematous-base heme-crusted tan verrucoid plaque consistent with inflamed seborrheic keratosis     Erythematous Silvery Scaling Plaque c/w Psoriasis     See annotation    Assessment/Plan:  Basal cell carcinoma of nose s/p Mohs and flap repair  - flap edema noted - rec dermabrasion today         Dermabrasion Operative Note  - counseled on R/B/L of  dermabrasion and need for wound care for at least 2 weeks afterwards, counseled that additional procedures carry additional risk and may not result in improved cosmesis if wound healing is complicated by infection, poor wound care, or delayed erythema  - Area cleansed with isopropryl Alcohol  - Area numbed with 1% lidocaine with 1:100,000 epinephrine  - Sterile bovie scratch pad used to abrade the epidermis and stop at the point of pinpoint bleeding from the papillary dermis  - ointment, telfa, and bandage applied  - Patient counseled to perform gentle wound care and keep ointment and a bandage on the area for at least 2 weeks, then begin silicone gel sheeting again to the area.      Follow up in 2 weeks        Daria Hedrick MD FAAD

## 2024-08-22 ENCOUNTER — OFFICE VISIT (OUTPATIENT)
Dept: DERMATOLOGY | Facility: CLINIC | Age: 69
End: 2024-08-22
Payer: MEDICARE

## 2024-08-22 DIAGNOSIS — D48.5 NEOPLASM OF UNCERTAIN BEHAVIOR OF SKIN: Primary | ICD-10-CM

## 2024-08-22 DIAGNOSIS — D22.9 MULTIPLE BENIGN NEVI: ICD-10-CM

## 2024-08-22 DIAGNOSIS — L82.1 SEBORRHEIC KERATOSIS: ICD-10-CM

## 2024-08-22 DIAGNOSIS — L57.0 AK (ACTINIC KERATOSIS): ICD-10-CM

## 2024-08-22 DIAGNOSIS — L81.4 LENTIGO: ICD-10-CM

## 2024-08-22 DIAGNOSIS — L90.5 SCAR: ICD-10-CM

## 2024-08-22 DIAGNOSIS — L57.8 ACTINIC SKIN DAMAGE: ICD-10-CM

## 2024-08-22 DIAGNOSIS — Z85.828 HISTORY OF NONMELANOMA SKIN CANCER: ICD-10-CM

## 2024-08-22 NOTE — PROGRESS NOTES
Subjective:      Patient ID:  Katerina Dickson is a 68 y.o. female who presents for   Chief Complaint   Patient presents with    Skin Check     TBSC     LOV 05/20/2024    Patient is coming in today for a TBSC. States she has a spots on her back and chest that she is concerned about.     Last Path:  Skin, right nasal ala, shave biopsy: [facility] Dr. K  - BASAL CELL CARCINOMA WITH MIXED SUPERFICIAL AND NODULAR GROWTH PATTERN.  - THE TUMOR EXTENDS TO THE DEEP AND LATERAL BIOPSY MARGINS    Derm Hx  Phx   BCC right nasal ala - [facility] Dr. K  Denies Phx NMSC        Review of Systems   Constitutional:  Negative for fever, chills and fatigue.   Respiratory:  Negative for cough and shortness of breath.    Gastrointestinal:  Negative for nausea, vomiting and diarrhea.   Skin:  Negative for daily sunscreen use and activity-related sunscreen use.   Hematologic/Lymphatic: Does not bruise/bleed easily.       Objective:   Physical Exam   Constitutional: She appears well-developed and well-nourished. No distress.   Neurological: She is alert and oriented to person, place, and time. She is not disoriented.   Psychiatric: She has a normal mood and affect.   Skin:   Areas Examined (abnormalities noted in diagram):   Scalp / Hair Palpated and Inspected  Head / Face Inspection Performed  Neck Inspection Performed  Chest / Axilla Inspection Performed  Abdomen Inspection Performed  Genitals / Buttocks / Groin Inspection Performed  Back Inspection Performed  RUE Inspected  LUE Inspection Performed  RLE Inspected  LLE Inspection Performed  Nails and Digits Inspection Performed                 Diagram Legend     Erythematous scaling macule/papule c/w actinic keratosis       Vascular papule c/w angioma      Pigmented verrucoid papule/plaque c/w seborrheic keratosis      Yellow umbilicated papule c/w sebaceous hyperplasia      Irregularly shaped tan macule c/w lentigo     1-2 mm smooth white papules consistent with Milia      Movable subcutaneous cyst  with punctum c/w epidermal inclusion cyst      Subcutaneous movable cyst c/w pilar cyst      Firm pink to brown papule c/w dermatofibroma      Pedunculated fleshy papule(s) c/w skin tag(s)      Evenly pigmented macule c/w junctional nevus     Mildly variegated pigmented, slightly irregular-bordered macule c/w mildly atypical nevus      Flesh colored to evenly pigmented papule c/w intradermal nevus       Pink pearly papule/plaque c/w basal cell carcinoma      Erythematous hyperkeratotic cursted plaque c/w SCC      Surgical scar with no sign of skin cancer recurrence      Open and closed comedones      Inflammatory papules and pustules      Verrucoid papule consistent consistent with wart     Erythematous eczematous patches and plaques     Dystrophic onycholytic nail with subungual debris c/w onychomycosis     Umbilicated papule    Erythematous-base heme-crusted tan verrucoid plaque consistent with inflamed seborrheic keratosis     Erythematous Silvery Scaling Plaque c/w Psoriasis     See annotation                Assessment / Plan:      Pathology Orders:       Normal Orders This Visit    Specimen to Pathology, Dermatology     Questions:    Procedure Type: Dermatology and skin neoplasms    Number of Specimens: 3    ------------------------: -------------------------    Spec 1 Procedure: Biopsy    Spec 1 Clinical Impression: BLK vs. IDN vs BCC    Spec 1 Source: mid lower back    ------------------------: -------------------------    Spec 2 Procedure: Biopsy    Spec 2 Clinical Impression: r/o bcc    Spec 2 Source: left scaphoid of ear    ------------------------: -------------------------    Spec 3 Procedure: Biopsy    Spec 3 Clinical Impression: r/o bcc    Spec 3 Source: right medial cheek    Release to patient:           Neoplasm of uncertain behavior of skin x3  -     Specimen to Pathology, Dermatology  Shave biopsy procedure note:    Shave biopsy performed after verbal consent including risk of infection, scar,  recurrence, need for additional treatment of site. Area prepped with alcohol, anesthetized with approximately 1.0cc of 1% lidocaine with epinephrine. Lesional tissue shaved with razor blade. Hemostasis achieved with application of aluminum chloride followed by hyfrecation. No complications. Dressing applied. Wound care explained.    Actinic keratosis   Cryosurgery Procedure Note    Verbal consent from the patient is obtained and the patient is aware of the precancerous quality and need for treatment of these lesions. Liquid nitrogen cryosurgery is applied to the 4 actinic keratoses, as detailed in the physical exam, to produce a freeze injury. The patient is aware that blisters may form and is instructed on wound care with gentle cleansing and use of vaseline ointment to keep moist until healed. The patient is supplied a handout on cryosurgery and is instructed to call if lesions do not completely resolve.    History of nonmelanoma skin cancer  Scar  Area of previous NMSC examined. Site well healed with no signs of recurrence.  Total body skin examination performed today including at least 12 points as noted in physical examination. Suspicious lesions noted.  Patient instructed in importance in daily broad spectrum sun protection of at least spf 30. Mineral sunscreen ingredients preferred (Zinc +/- Titanium) and can be found OTC.   Patient encouraged to wear hat for all outdoor exposure.   Also discussed sun avoidance and use of protective clothing.    Multiple benign nevi  Careful dermoscopy evaluation of nevi performed  Monitor for new mole or moles that are becoming bigger, darker, irritated, or developing irregular borders.     Seborrheic keratosis  These are benign inherited growths without a malignant potential. Reassurance given to patient. No treatment is necessary.     Lentigo  This is a benign hyperpigmented sun induced lesion. Daily sun protection will reduce the number of new lesions. Treatment of these  benign lesions are considered cosmetic.    Actinic skin damage  Desquamation likely secondary to recent burn  Increase sun protection          6 months  No follow-ups on file.

## 2024-08-22 NOTE — PATIENT INSTRUCTIONS

## 2024-08-27 ENCOUNTER — TELEPHONE (OUTPATIENT)
Dept: DERMATOLOGY | Facility: CLINIC | Age: 69
End: 2024-08-27
Payer: MEDICARE

## 2024-08-27 DIAGNOSIS — C44.319 BASAL CELL CARCINOMA (BCC) OF RIGHT MEDIAL CHEEK: ICD-10-CM

## 2024-08-27 DIAGNOSIS — C44.219 BCC (BASAL CELL CARCINOMA), EAR, LEFT: Primary | ICD-10-CM

## 2024-09-04 DIAGNOSIS — C44.219 BCC (BASAL CELL CARCINOMA), EAR, LEFT: Primary | ICD-10-CM

## 2024-09-04 DIAGNOSIS — C44.319 BASAL CELL CARCINOMA OF CHEEK: Primary | ICD-10-CM

## 2024-09-18 DIAGNOSIS — M47.22 OSTEOARTHRITIS OF SPINE WITH RADICULOPATHY, CERVICAL REGION: ICD-10-CM

## 2024-09-18 DIAGNOSIS — E03.9 HYPOTHYROIDISM, UNSPECIFIED TYPE: ICD-10-CM

## 2024-09-18 RX ORDER — GABAPENTIN 300 MG/1
300 CAPSULE ORAL 2 TIMES DAILY
Qty: 180 CAPSULE | Refills: 2 | Status: SHIPPED | OUTPATIENT
Start: 2024-09-18

## 2024-09-18 RX ORDER — LEVOTHYROXINE SODIUM 100 UG/1
100 TABLET ORAL DAILY
Qty: 90 TABLET | Refills: 3 | Status: SHIPPED | OUTPATIENT
Start: 2024-09-18

## 2024-09-18 NOTE — TELEPHONE ENCOUNTER
No care due was identified.  [facility] Embedded Care Due Messages. Reference number: [id number].   9/18/2024 10:37:56 AM CDT

## 2024-09-21 ENCOUNTER — PATIENT MESSAGE (OUTPATIENT)
Dept: FAMILY MEDICINE | Facility: CLINIC | Age: 69
End: 2024-09-21
Payer: MEDICARE

## 2024-10-09 ENCOUNTER — OFFICE VISIT (OUTPATIENT)
Dept: ORTHOPEDICS | Facility: CLINIC | Age: 69
End: 2024-10-09
Payer: MEDICARE

## 2024-10-09 VITALS
BODY MASS INDEX: 20.83 KG/M2 | SYSTOLIC BLOOD PRESSURE: 132 MMHG | WEIGHT: 140.63 LBS | HEART RATE: 74 BPM | DIASTOLIC BLOOD PRESSURE: 78 MMHG | HEIGHT: 69 IN

## 2024-10-09 DIAGNOSIS — M70.61 GREATER TROCHANTERIC BURSITIS OF RIGHT HIP: Primary | ICD-10-CM

## 2024-10-09 PROCEDURE — 3288F FALL RISK ASSESSMENT DOCD: CPT | Mod: CPTII,S$GLB,, | Performed by: NURSE PRACTITIONER

## 2024-10-09 PROCEDURE — 1159F MED LIST DOCD IN RCRD: CPT | Mod: CPTII,S$GLB,, | Performed by: NURSE PRACTITIONER

## 2024-10-09 PROCEDURE — 3075F SYST BP GE 130 - 139MM HG: CPT | Mod: CPTII,S$GLB,, | Performed by: NURSE PRACTITIONER

## 2024-10-09 PROCEDURE — 99999 PR PBB SHADOW E&M-EST. PATIENT-LVL IV: CPT | Mod: PBBFAC,,, | Performed by: NURSE PRACTITIONER

## 2024-10-09 PROCEDURE — 3044F HG A1C LEVEL LT 7.0%: CPT | Mod: CPTII,S$GLB,, | Performed by: NURSE PRACTITIONER

## 2024-10-09 PROCEDURE — 20610 DRAIN/INJ JOINT/BURSA W/O US: CPT | Mod: RT,S$GLB,, | Performed by: NURSE PRACTITIONER

## 2024-10-09 PROCEDURE — 1160F RVW MEDS BY RX/DR IN RCRD: CPT | Mod: CPTII,S$GLB,, | Performed by: NURSE PRACTITIONER

## 2024-10-09 PROCEDURE — 1157F ADVNC CARE PLAN IN RCRD: CPT | Mod: CPTII,S$GLB,, | Performed by: NURSE PRACTITIONER

## 2024-10-09 PROCEDURE — 99213 OFFICE O/P EST LOW 20 MIN: CPT | Mod: 25,S$GLB,, | Performed by: NURSE PRACTITIONER

## 2024-10-09 PROCEDURE — 1125F AMNT PAIN NOTED PAIN PRSNT: CPT | Mod: CPTII,S$GLB,, | Performed by: NURSE PRACTITIONER

## 2024-10-09 PROCEDURE — 1101F PT FALLS ASSESS-DOCD LE1/YR: CPT | Mod: CPTII,S$GLB,, | Performed by: NURSE PRACTITIONER

## 2024-10-09 PROCEDURE — 3078F DIAST BP <80 MM HG: CPT | Mod: CPTII,S$GLB,, | Performed by: NURSE PRACTITIONER

## 2024-10-09 PROCEDURE — 3008F BODY MASS INDEX DOCD: CPT | Mod: CPTII,S$GLB,, | Performed by: NURSE PRACTITIONER

## 2024-10-09 RX ADMIN — TRIAMCINOLONE ACETONIDE 40 MG: 40 INJECTION, SUSPENSION INTRA-ARTICULAR; INTRAMUSCULAR at 08:10

## 2024-10-09 NOTE — PROGRESS NOTES
Chief Complaint   Patient presents with    Right Hip - Pain      HPI:   This is a 69 y.o. who presents to clinic today for right hip pain for the past 3 weeks after no known trauma. Complains of pain while sleeping on side and when descending stairs. Pain is dull and deep. No numbness or tingling. No associated signs or symptoms.      Past Medical History:   Diagnosis Date    Basal cell carcinoma     BRCA1 negative     BRCA2 negative     GERD (gastroesophageal reflux disease)     Hashimoto's thyroiditis 06/2007    Hypothyroidism     Pancreatitis 1988,1995    Postmenopausal HRT (hormone replacement therapy)      Past Surgical History:   Procedure Laterality Date    CHOLECYSTECTOMY  2006    COLONOSCOPY  2012    every 5    COLONOSCOPY N/A 10/23/2017    Procedure: COLONOSCOPY;  Surgeon: Pankaj Shah MD;  Location: [facility] ENDO;  Service: Endoscopy;  Laterality: N/A;    COLONOSCOPY N/A 6/6/2023    Procedure: COLONOSCOPY;  Surgeon: Pankaj Shah MD;  Location: [facility] ENDO;  Service: Endoscopy;  Laterality: N/A;    ESOPHAGOGASTRODUODENOSCOPY      EYE SURGERY Bilateral     cataract    HYSTERECTOMY  1995    heavy periods    OOPHORECTOMY       Current Outpatient Medications on File Prior to Visit   Medication Sig Dispense Refill    BIOTIN ORAL Take by mouth Daily.      buPROPion (WELLBUTRIN XL) 300 MG 24 hr tablet Take 1 tablet (300 mg total) by mouth once daily. 90 tablet 3    DOXYLAMINE SUCCINATE (UNISOM ORAL) Take 100 mg by mouth every evening. 2 tablets nightly      estradioL (VIVELLE-DOT) 0.075 mg/24 hr APPLY 1 PATCH TOPICALLY TO THE SKIN 2 TIMES A WEEK 24 patch 3    gabapentin (NEURONTIN) 300 MG capsule Take 1 capsule (300 mg total) by mouth 2 (two) times daily. 180 capsule 2    levothyroxine (SYNTHROID) 100 MCG tablet Take 1 tablet (100 mcg total) by mouth once daily. 90 tablet 3    meloxicam (MOBIC) 15 MG tablet Take 1 tablet (15 mg total) by mouth once daily. 90 tablet 3    rosuvastatin (CRESTOR) 5 MG  tablet Take 1 tablet (5 mg total) by mouth once daily. 90 tablet 3     No current facility-administered medications on file prior to visit.     Review of patient's allergies indicates:  No Known Allergies  Family History   Problem Relation Name Age of Onset    Breast cancer Mother  38         at 39 years of age    Arthritis Father      Breast cancer Sister  69    Breast cancer Maternal Grandmother      Heart disease Neg Hx       Social History     Socioeconomic History    Marital status:    Tobacco Use    Smoking status: Former     Current packs/day: 0.00     Types: Cigarettes     Quit date: 1995     Years since quittin.1    Smokeless tobacco: Never   Substance and Sexual Activity    Alcohol use: Yes     Alcohol/week: 9.0 standard drinks of alcohol     Types: 1 Glasses of wine, 1 Shots of liquor, 7 Standard drinks or equivalent per week     Comment: 2-3 drinks per day    Drug use: No    Sexual activity: Yes     Partners: Male     Birth control/protection: Surgical     Social Drivers of Health     Financial Resource Strain: Patient Declined (2024)    Overall Financial Resource Strain (CARDIA)     Difficulty of Paying Living Expenses: Patient declined   Food Insecurity: Patient Declined (2024)    Hunger Vital Sign     Worried About Running Out of Food in the Last Year: Patient declined     Ran Out of Food in the Last Year: Patient declined   Physical Activity: Unknown (2024)    Exercise Vital Sign     Days of Exercise per Week: Patient declined   Housing Stability: Unknown (2024)    Housing Stability Vital Sign     Unable to Pay for Housing in the Last Year: Patient declined       Review of Systems:  Constitutional:  Denies fever or chills   Eyes:  Denies change in visual acuity   HENT:  Denies nasal congestion or sore throat   Respiratory:  Denies cough or shortness of breath   Cardiovascular:  Denies chest pain or edema   GI:  Denies abdominal pain, nausea, vomiting, bloody  stools or diarrhea   :  Denies dysuria   Integument:  Denies rash   Neurologic:  Denies headache, focal weakness or sensory changes   Endocrine:  Denies polyuria or polydipsia   Lymphatic:  Denies swollen glands   Psychiatric:  Denies depression or anxiety     Physical Exam:   Constitutional:  Well developed, well nourished, no acute distress, non-toxic appearance   Integument:  Well hydrated, no rash   Lymphatic:  No lymphadenopathy noted   Neurologic:  Alert & oriented x 3  Psychiatric:  Speech and behavior appropriate     Bilateral Hip Exam    left Hip Exam   left hip exam performed same as contralateral hip and is normal.     right Hip Exam   Tenderness   The patient is experiencing tenderness in the greater trochanter.  Range of Motion   The patient has normal hip ROM.  Muscle Strength   Abduction: 4/5   Other   Erythema: absent  Sensation: normal  Pulse: present          Assessment & plan    Greater trochanteric bursitis of right hip  -     Large Joint Aspiration/Injection: R greater trochanteric bursa  -     triamcinolone acetonide injection 40 mg           Using an aseptic technique, I injected 5 cc of lidocaine 1% without and 1 cc of kenalog 40mg into the right hip. The patient tolerated this well. I will have them return to clinic as needed.

## 2024-10-13 RX ORDER — TRIAMCINOLONE ACETONIDE 40 MG/ML
40 INJECTION, SUSPENSION INTRA-ARTICULAR; INTRAMUSCULAR
Status: DISCONTINUED | OUTPATIENT
Start: 2024-10-09 | End: 2024-10-14 | Stop reason: HOSPADM

## 2024-10-14 NOTE — PROCEDURES
Large Joint Aspiration/Injection: R greater trochanteric bursa    Date/Time: 10/9/2024 8:20 AM    Performed by: Xi Castro FNP  Authorized by: Xi Castro FNP    Consent Done?:  Yes (Verbal)  Indications:  Pain  Timeout: prior to procedure the correct patient, procedure, and site was verified    Prep: patient was prepped and draped in usual sterile fashion      Local anesthesia used?: Yes    Local anesthetic:  Lidocaine 1% without epinephrine  Anesthetic total (ml):  5      Details:  Needle Size:  22 G  Approach:  Lateral  Location:  Hip  Site:  R greater trochanteric bursa  Medications:  40 mg triamcinolone acetonide 40 mg/mL  Patient tolerance:  Patient tolerated the procedure well with no immediate complications

## 2024-10-21 ENCOUNTER — OFFICE VISIT (OUTPATIENT)
Dept: DERMATOLOGY | Facility: CLINIC | Age: 69
End: 2024-10-21
Payer: MEDICARE

## 2024-10-21 DIAGNOSIS — C44.219 BASAL CELL CARCINOMA, EAR, LEFT: ICD-10-CM

## 2024-10-21 DIAGNOSIS — C44.310 BASAL CELL CARCINOMA, FACE: ICD-10-CM

## 2024-10-21 DIAGNOSIS — C44.91 SUPERFICIAL BASAL CELL CARCINOMA: Primary | ICD-10-CM

## 2024-10-21 PROCEDURE — 1126F AMNT PAIN NOTED NONE PRSNT: CPT | Mod: CPTII,S$GLB,, | Performed by: STUDENT IN AN ORGANIZED HEALTH CARE EDUCATION/TRAINING PROGRAM

## 2024-10-21 PROCEDURE — 1160F RVW MEDS BY RX/DR IN RCRD: CPT | Mod: CPTII,S$GLB,, | Performed by: STUDENT IN AN ORGANIZED HEALTH CARE EDUCATION/TRAINING PROGRAM

## 2024-10-21 PROCEDURE — 17261 DSTRJ MAL LES T/A/L .6-1.0CM: CPT | Mod: S$GLB,,, | Performed by: STUDENT IN AN ORGANIZED HEALTH CARE EDUCATION/TRAINING PROGRAM

## 2024-10-21 PROCEDURE — 1159F MED LIST DOCD IN RCRD: CPT | Mod: CPTII,S$GLB,, | Performed by: STUDENT IN AN ORGANIZED HEALTH CARE EDUCATION/TRAINING PROGRAM

## 2024-10-21 PROCEDURE — 99213 OFFICE O/P EST LOW 20 MIN: CPT | Mod: 25,S$GLB,, | Performed by: STUDENT IN AN ORGANIZED HEALTH CARE EDUCATION/TRAINING PROGRAM

## 2024-10-21 PROCEDURE — 1157F ADVNC CARE PLAN IN RCRD: CPT | Mod: CPTII,S$GLB,, | Performed by: STUDENT IN AN ORGANIZED HEALTH CARE EDUCATION/TRAINING PROGRAM

## 2024-10-21 PROCEDURE — 3044F HG A1C LEVEL LT 7.0%: CPT | Mod: CPTII,S$GLB,, | Performed by: STUDENT IN AN ORGANIZED HEALTH CARE EDUCATION/TRAINING PROGRAM

## 2024-10-21 PROCEDURE — 3288F FALL RISK ASSESSMENT DOCD: CPT | Mod: CPTII,S$GLB,, | Performed by: STUDENT IN AN ORGANIZED HEALTH CARE EDUCATION/TRAINING PROGRAM

## 2024-10-21 PROCEDURE — 1101F PT FALLS ASSESS-DOCD LE1/YR: CPT | Mod: CPTII,S$GLB,, | Performed by: STUDENT IN AN ORGANIZED HEALTH CARE EDUCATION/TRAINING PROGRAM

## 2024-10-21 NOTE — Clinical Note
Very hesitant about having mohs again due to scarring, discussed radiation for her right cheek bcc, she is scheduled and planning to come to appointment for left ear on Wednesday, told her you could discuss options

## 2024-10-21 NOTE — PROGRESS NOTES
Dictation #1  MRN:[id number]  [facility]:[id number]   Subjective:      Patient ID:  Katerina Dickson is a 69 y.o. female who presents for   Chief Complaint   Patient presents with    Skin Cancer     ED&C     LOV 8/22/24    Patient here today for ED&C of BCC on mid lower back.     Component 2 mo ago  Final Pathologic Diagnosis 1. Skin,  Mid lower back,  shave biopsy:   - BASAL CELL CARCINOMA, SUPERFICIAL-MULTIFOCAL TYPE   - The tumor involves the deep and peripheral tissue edges.    This lesion is skin cancer. You will be contacted regarding treatment.      2. Skin,  Left scaphoid of ear, shave biopsy:   - BASAL CELL CARCINOMA, NODULAR TYPE   - The tumor involves the deep and peripheral tissue edges.    This lesion is skin cancer. You will be contacted regarding treatment.      3. Skin,   Right medial cheek,  shave biopsy:   - BASAL CELL CARCINOMA, NODULAR TYPE   - The tumor involves the deep tissue edge.  The peripheral tissue edge is uninvolved by tumor.    This lesion is skin cancer. You will be contacted regarding treatment.          Derm Hx:  BCC, right nasal ala, mohs Dr. MCLAIN, 5/2024            Review of Systems   Constitutional:  Negative for fever, chills and fatigue.   Respiratory:  Negative for cough and shortness of breath.    Gastrointestinal:  Negative for nausea, vomiting and diarrhea.   Skin:  Negative for daily sunscreen use and activity-related sunscreen use.   Hematologic/Lymphatic: Does not bruise/bleed easily.       Objective:   Physical Exam   Constitutional: She appears well-developed and well-nourished.   Neurological: She is alert and oriented to person, place, and time.   Psychiatric: She has a normal mood and affect.   Skin:   Areas Examined (abnormalities noted in diagram):   Head / Face Inspection Performed  Back Inspection Performed                 Diagram Legend     Erythematous scaling macule/papule c/w actinic keratosis       Vascular papule c/w angioma      Pigmented verrucoid papule/plaque c/w  seborrheic keratosis      Yellow umbilicated papule c/w sebaceous hyperplasia      Irregularly shaped tan macule c/w lentigo     1-2 mm smooth white papules consistent with Milia      Movable subcutaneous cyst with punctum c/w epidermal inclusion cyst      Subcutaneous movable cyst c/w pilar cyst      Firm pink to brown papule c/w dermatofibroma      Pedunculated fleshy papule(s) c/w skin tag(s)      Evenly pigmented macule c/w junctional nevus     Mildly variegated pigmented, slightly irregular-bordered macule c/w mildly atypical nevus      Flesh colored to evenly pigmented papule c/w intradermal nevus       Pink pearly papule/plaque c/w basal cell carcinoma      Erythematous hyperkeratotic cursted plaque c/w SCC      Surgical scar with no sign of skin cancer recurrence      Open and closed comedones      Inflammatory papules and pustules      Verrucoid papule consistent consistent with wart     Erythematous eczematous patches and plaques     Dystrophic onycholytic nail with subungual debris c/w onychomycosis     Umbilicated papule    Erythematous-base heme-crusted tan verrucoid plaque consistent with inflamed seborrheic keratosis     Erythematous Silvery Scaling Plaque c/w Psoriasis     See annotation              Assessment / Plan:        Superficial basal cell carcinoma  Here for electrodesiccation and curettage of Superficial BCC on the mid lower back.:      Electrodessication and Curettage Procedure note:    Verbal consent obtained. Lesional tissue marked and prepped with alcohol. Lesion anesthetized with 1% lidocaine with epinephrine. Curettage and Desiccation x 3 cycles to base. Aluminum chloride for hemostasis. Lesion size after primary curettage: 0.9 cm    Area bandaged and wound care explained.    Basal cell carcinoma, ear, left  + deep and peripheral margins  Recommend mohs surgery, scheduled for Wednesday with Dr. MCLAIN    Basal cell carcinoma, face  Cancelled her appointment w/ Dr. MCLAIN  Discussed importance  of treatment, risks of not treating  Also discussed radiation in depth, will discuss with Dr. MCLAIN             No follow-ups on file.

## 2024-10-21 NOTE — PATIENT INSTRUCTIONS

## 2024-10-23 ENCOUNTER — PROCEDURE VISIT (OUTPATIENT)
Dept: DERMATOLOGY | Facility: CLINIC | Age: 69
End: 2024-10-23
Payer: MEDICARE

## 2024-10-23 VITALS
HEART RATE: 80 BPM | HEIGHT: 69 IN | WEIGHT: 145 LBS | SYSTOLIC BLOOD PRESSURE: 130 MMHG | BODY MASS INDEX: 21.48 KG/M2 | DIASTOLIC BLOOD PRESSURE: 76 MMHG

## 2024-10-23 DIAGNOSIS — C44.219 BCC (BASAL CELL CARCINOMA), EAR, LEFT: ICD-10-CM

## 2024-10-23 NOTE — Clinical Note
Discussed the cheek with her, she is amenable to mohs though not excited, briefly discussed doing field therapy after everything heals.

## 2024-10-23 NOTE — PROGRESS NOTES
MOHS MICROGRAPHIC SURGERY OPERATIVE NOTE  Name:  Katerina Dickson  Date: 10/23/2024  Patient : 1955  Attending Surgeon: Daria Hedrick MD  Assistants: Sarah Shook - Surgical Technician  Anesthetic Agent: 1% lidocaine with 1:100,000 epinephrine  Clinical Diagnosis: basal cell carcinoma - nodular  Operation: Mohs Micrographic Surgery  Location: left scaphoid of ear  Indications: Location in mask areas of face including central face, nose, eyelids, eyebrows, lips, chin, preauricular, temple, and ear.  Surgical Preparation: povidone-iodine  Pre-op anbitioics: no     Description of Operation:  The nature and purpose of the procedure, associated risks and alternative treatments were explained to the patient in detail. All patient questions were answered completely. An informed operative consent and photography permit were obtained. The tumor location was then identified and marked with agreement by the patient of the correct location. The patient was positioned, prepped, and draped in the usual sterile manner. Local anesthesia was obtained with 1 cc(s) of 1% lidocaine with 1:100,000 epinephrine. The lesion pre-operatively measures 1.2 by 1.0 cm.     1ST STAGE:  A 2+ mm rim of normal appearing skin was marked circumferentially around the lesion after scraping with a curette to define the margin. The area thus outlined was excised at a 45 degree angle. Hemostasis was obtained with electrodesiccation. The specimen was oriented, mapped, and subdivided into at least two sections. Sections were then chromacoded and submitted for horizontal frozen sections. The patient tolerated the procedure well and there were no complications. Upon microscopic examination of the processed horizontal frozen sections of this stage:  No residual tumor was noted.      SUMMARY:  The tumor was extirpated in 1 Mohs Stages resulting in a final defect measuring 1.4 by 1.2 cm².   The final defect extended deep to perichondrium  The  patient tolerated the procedure well and no complications were noted.     Due to location and patient preference, lesion will heal by second intention.     DEFECT PHOTO:      Daria Hedrick MD

## 2024-10-28 ENCOUNTER — LAB VISIT (OUTPATIENT)
Dept: LAB | Facility: HOSPITAL | Age: 69
End: 2024-10-28
Attending: INTERNAL MEDICINE
Payer: MEDICARE

## 2024-10-28 DIAGNOSIS — E78.5 HYPERLIPIDEMIA, UNSPECIFIED HYPERLIPIDEMIA TYPE: ICD-10-CM

## 2024-10-28 LAB
ALBUMIN SERPL BCP-MCNC: 3.9 G/DL (ref 3.5–5.2)
ALP SERPL-CCNC: 54 U/L (ref 40–150)
ALT SERPL W/O P-5'-P-CCNC: 16 U/L (ref 10–44)
ANION GAP SERPL CALC-SCNC: 7 MMOL/L (ref 8–16)
AST SERPL-CCNC: 18 U/L (ref 10–40)
BILIRUB SERPL-MCNC: 0.6 MG/DL (ref 0.1–1)
BUN SERPL-MCNC: 20 MG/DL (ref 8–23)
CALCIUM SERPL-MCNC: 9.4 MG/DL (ref 8.7–10.5)
CHLORIDE SERPL-SCNC: 105 MMOL/L (ref 95–110)
CHOLEST SERPL-MCNC: 211 MG/DL (ref 120–199)
CHOLEST/HDLC SERPL: 3 {RATIO} (ref 2–5)
CO2 SERPL-SCNC: 28 MMOL/L (ref 23–29)
CREAT SERPL-MCNC: 1 MG/DL (ref 0.5–1.4)
EST. GFR  (NO RACE VARIABLE): >60 ML/MIN/1.73 M^2
GLUCOSE SERPL-MCNC: 99 MG/DL (ref 70–110)
HDLC SERPL-MCNC: 71 MG/DL (ref 40–75)
HDLC SERPL: 33.6 % (ref 20–50)
LDLC SERPL CALC-MCNC: 114.8 MG/DL (ref 63–159)
NONHDLC SERPL-MCNC: 140 MG/DL
POTASSIUM SERPL-SCNC: 4.7 MMOL/L (ref 3.5–5.1)
PROT SERPL-MCNC: 7 G/DL (ref 6–8.4)
SODIUM SERPL-SCNC: 140 MMOL/L (ref 136–145)
TRIGL SERPL-MCNC: 126 MG/DL (ref 30–150)

## 2024-10-28 PROCEDURE — 80061 LIPID PANEL: CPT | Performed by: INTERNAL MEDICINE

## 2024-10-28 PROCEDURE — 80053 COMPREHEN METABOLIC PANEL: CPT | Performed by: INTERNAL MEDICINE

## 2024-10-28 PROCEDURE — 36415 COLL VENOUS BLD VENIPUNCTURE: CPT | Mod: PO | Performed by: INTERNAL MEDICINE

## 2024-10-30 ENCOUNTER — OFFICE VISIT (OUTPATIENT)
Dept: DERMATOLOGY | Facility: CLINIC | Age: 69
End: 2024-10-30
Payer: MEDICARE

## 2024-10-30 DIAGNOSIS — S01.302D UNSPECIFIED OPEN WOUND OF LEFT EAR, SUBSEQUENT ENCOUNTER: Primary | ICD-10-CM

## 2024-10-30 PROCEDURE — 3044F HG A1C LEVEL LT 7.0%: CPT | Mod: CPTII,S$GLB,, | Performed by: DERMATOLOGY

## 2024-10-30 PROCEDURE — 1157F ADVNC CARE PLAN IN RCRD: CPT | Mod: CPTII,S$GLB,, | Performed by: DERMATOLOGY

## 2024-10-30 PROCEDURE — 99024 POSTOP FOLLOW-UP VISIT: CPT | Mod: S$GLB,,, | Performed by: DERMATOLOGY

## 2024-10-30 NOTE — PROGRESS NOTES
Dermatologic Surgery Wound Check Note  Name: Katerina Dickson   Date: 10/30/2024  Patient : 1955    Attending Surgeon: Daria Hedrick MD FAAD  Assistants: Melissa French RN  Location: left ear    Subjective: Patient presents today for wound check on the left ear. Patient reports no issues. Denies pain    Objective:  Physical Exam   EENT     Ear comments: Wound healing well no e/o infection or chondritis. Minimal fibrin deposition      Wound healing as expected. Clean, dry, intact. No evidence of any complications         Assessment and plan:    Patient to return to clinic in 7-10 days    Diagnoses and all orders for this visit:    Unspecified open wound of left ear, subsequent encounter  - continue gentle wound care (soapy fingers QD followed by polysporin, aquaphor, vaseline, or cerave healing ointment, then telfa pad, and tape)  - follow up 1 week for wound check

## 2024-10-31 ENCOUNTER — OFFICE VISIT (OUTPATIENT)
Dept: FAMILY MEDICINE | Facility: CLINIC | Age: 69
End: 2024-10-31
Payer: MEDICARE

## 2024-10-31 VITALS
BODY MASS INDEX: 22.15 KG/M2 | WEIGHT: 149.56 LBS | TEMPERATURE: 98 F | DIASTOLIC BLOOD PRESSURE: 90 MMHG | HEART RATE: 72 BPM | SYSTOLIC BLOOD PRESSURE: 130 MMHG | OXYGEN SATURATION: 95 % | HEIGHT: 69 IN | RESPIRATION RATE: 18 BRPM

## 2024-10-31 DIAGNOSIS — Z12.31 ENCOUNTER FOR SCREENING MAMMOGRAM FOR MALIGNANT NEOPLASM OF BREAST: ICD-10-CM

## 2024-10-31 DIAGNOSIS — M47.22 OSTEOARTHRITIS OF SPINE WITH RADICULOPATHY, CERVICAL REGION: ICD-10-CM

## 2024-10-31 DIAGNOSIS — E04.2 MULTINODULAR GOITER: ICD-10-CM

## 2024-10-31 DIAGNOSIS — K21.9 GASTROESOPHAGEAL REFLUX DISEASE WITHOUT ESOPHAGITIS: ICD-10-CM

## 2024-10-31 DIAGNOSIS — E03.9 HYPOTHYROIDISM, UNSPECIFIED TYPE: ICD-10-CM

## 2024-10-31 DIAGNOSIS — F33.0 MAJOR DEPRESSIVE DISORDER, RECURRENT, MILD: ICD-10-CM

## 2024-10-31 DIAGNOSIS — Z23 NEED FOR INFLUENZA VACCINATION: ICD-10-CM

## 2024-10-31 DIAGNOSIS — E78.5 HYPERLIPIDEMIA, UNSPECIFIED HYPERLIPIDEMIA TYPE: Primary | ICD-10-CM

## 2024-10-31 DIAGNOSIS — F51.01 PRIMARY INSOMNIA: ICD-10-CM

## 2024-10-31 DIAGNOSIS — Z78.0 ASYMPTOMATIC MENOPAUSAL STATE: ICD-10-CM

## 2024-10-31 DIAGNOSIS — Z79.890 HORMONE REPLACEMENT THERAPY (HRT): ICD-10-CM

## 2024-10-31 DIAGNOSIS — M70.61 TROCHANTERIC BURSITIS OF RIGHT HIP: ICD-10-CM

## 2024-10-31 RX ORDER — ATORVASTATIN CALCIUM 10 MG/1
10 TABLET, FILM COATED ORAL DAILY
Qty: 90 TABLET | Refills: 3 | Status: SHIPPED | OUTPATIENT
Start: 2024-10-31 | End: 2025-10-31

## 2024-11-13 ENCOUNTER — PROCEDURE VISIT (OUTPATIENT)
Dept: DERMATOLOGY | Facility: CLINIC | Age: 69
End: 2024-11-13
Payer: MEDICARE

## 2024-11-13 VITALS
BODY MASS INDEX: 22.14 KG/M2 | SYSTOLIC BLOOD PRESSURE: 141 MMHG | HEART RATE: 71 BPM | HEIGHT: 69 IN | WEIGHT: 149.5 LBS | DIASTOLIC BLOOD PRESSURE: 86 MMHG

## 2024-11-13 DIAGNOSIS — C44.319 BASAL CELL CARCINOMA OF CHEEK: ICD-10-CM

## 2024-11-13 DIAGNOSIS — L90.5 SCAR: Primary | ICD-10-CM

## 2024-11-13 PROCEDURE — 17311 MOHS 1 STAGE H/N/HF/G: CPT | Mod: 51,S$GLB,, | Performed by: DERMATOLOGY

## 2024-11-13 PROCEDURE — 13132 CMPLX RPR F/C/C/M/N/AX/G/H/F: CPT | Mod: S$GLB,,, | Performed by: DERMATOLOGY

## 2024-11-13 NOTE — PROGRESS NOTES
MOHS MICROGRAPHIC SURGERY OPERATIVE NOTE  Name:  Katerina Dickson  Date: 2024   Patient : 1955  Attending Surgeon: Daria Hedrick MD  Assistants: Madisyn Figueroa - Surgical Technician  Anesthetic Agent: 1% lidocaine with 1:100,000 epinephrine  Clinical Diagnosis: basal cell carcinoma - nodular  Operation: Mohs Micrographic Surgery  Location: right medial cheek  Indications: Location in mask areas of face including central face, nose, eyelids, eyebrows, lips, chin, preauricular, temple, and ear.  Surgical Preparation: povidone-iodine  Pre-op anbitioics: no     Description of Operation:  The nature and purpose of the procedure, associated risks and alternative treatments were explained to the patient in detail. All patient questions were answered completely. An informed operative consent and photography permit were obtained. The tumor location was then identified and marked with agreement by the patient of the correct location. The patient was positioned, prepped, and draped in the usual sterile manner. Local anesthesia was obtained with 1 cc(s) of 1% lidocaine with 1:100,000 epinephrine. The lesion pre-operatively measures 0.8 by 0.6 cm.     1ST STAGE:  A 2+ mm rim of normal appearing skin was marked circumferentially around the lesion after scraping with a curette to define the margin. The area thus outlined was excised at a 45 degree angle. Hemostasis was obtained with electrodesiccation. The specimen was oriented, mapped, and subdivided into at least two sections. Sections were then chromacoded and submitted for horizontal frozen sections. The patient tolerated the procedure well and there were no complications. Upon microscopic examination of the processed horizontal frozen sections of this stage:  No residual tumor was noted.      SUMMARY:  The tumor was extirpated in 1 Mohs Stages resulting in a final defect measuring 0.8 by 0.6 cm².   The final defect extended deep to subcutaneous fat  The  patient tolerated the procedure well and no complications were noted.     DEFECT PHOTO:      Daria Hedrick MD  Complex Linear Closure Operative Note  Name: Katerina Dickson  YOB: 1955  Date: 11/13/2024  Attending Surgeon: Daria Hedrick MD FAAD  Assistants:  Madisyn Figueroa - Surgical Technician  Clinical Diagnosis: A 0.8 by 0.6 cm defect secondary to Mohs Micrographic Surgery  Location: right medial cheek  Operation: Complex linear closure  Surgical Preparation: broad scrub with povidone-iodine    Description of Operation:  The nature and purpose of the procedure, associated risks and alternative treatments were explained to the patient in detail. All patient questions were answered completely. In order to minimize tension on the closure and avoid compromising the anatomic contour of the cosmetic region and maximize functional capacity, a complex linear closure was performed. An informed operative consent and photography permit were obtained. The patient was positioned, prepped, and draped in the usual sterile manner. Local anesthesia was obtained with 3 cc(s) of 1% lidocaine with 1:100,000 epinephrine.    The defect edges were debeveled with a scalpel blade. The circular defect was widely undermined in the deep subcutaneous plane at least 100% of the defect diameter to allow for maximum tissue movement. Hemostasis was achieved with spot electrodessication. The defect was closed first utilizing multiple 5-0 Monocryl interrupted buried subcutaneous sutures. This resulted in excellent apposition of the dermis and epidermis, however two redundant areas of tissue were created on opposite sides of the defect. Utilizing a #15 scalpel blade, two Burows triangles were excised to ensure a flat scar. Hemostasis was obtained with spot electrodessication. The skin edges throughout further fastened superficially with 6-0 Prolene. The final length of the repair was 2.6 cm. The patient tolerated the  procedure well and there were no complications.    Polysporin, non-adherent gauze and a pressure dressing were applied to wound after gentle cleansing with saline.    Patient instructed in and provided with instructions for post-op care, will RTC in 7 days for suture removal    Post op meds: None    Photos:      MD HERRERA Zavaleta   No

## 2024-11-14 NOTE — PROGRESS NOTES
Dermatology Outpatient Clinic Progress Note    Patient Name: Katerina Dickson  Patient : 1955  MRN: [id number]  Date of Service: 2024    CC/HPI: Katerina Dickson is a 69 y.o. year old female with history of basal cell carcinoma who presents today for Mohs of the right cheek bcc .  Patient reports she would also like to do another round of dermabrasion for the R ala spiral flap.     ROS:   Dermatological ROS: positive for skin lesion changes    Physical Exam   Head   Head comments: Small area of bridging and noticeable inferior suture line           Diagram Legend     Erythematous scaling macule/papule c/w actinic keratosis       Vascular papule c/w angioma      Pigmented verrucoid papule/plaque c/w seborrheic keratosis      Yellow umbilicated papule c/w sebaceous hyperplasia      Irregularly shaped tan macule c/w lentigo     1-2 mm smooth white papules consistent with Milia      Movable subcutaneous cyst with punctum c/w epidermal inclusion cyst      Subcutaneous movable cyst c/w pilar cyst      Firm pink to brown papule c/w dermatofibroma      Pedunculated fleshy papule(s) c/w skin tag(s)      Evenly pigmented macule c/w junctional nevus     Mildly variegated pigmented, slightly irregular-bordered macule c/w mildly atypical nevus      Flesh colored to evenly pigmented papule c/w intradermal nevus       Pink pearly papule/plaque c/w basal cell carcinoma      Erythematous hyperkeratotic cursted plaque c/w SCC      Surgical scar with no sign of skin cancer recurrence      Open and closed comedones      Inflammatory papules and pustules      Verrucoid papule consistent consistent with wart     Erythematous eczematous patches and plaques     Dystrophic onycholytic nail with subungual debris c/w onychomycosis     Umbilicated papule    Erythematous-base heme-crusted tan verrucoid plaque consistent with inflamed seborrheic keratosis     Erythematous Silvery Scaling Plaque c/w Psoriasis     See  annotation    Assessment/Plan:    Diagnoses and all orders for this visit:    Scar         Dermabrasion Operative Note  - counseled on R/B/L of dermabrasion and need for wound care for at least 2 weeks afterwards, counseled that additional procedures carry additional risk and may not result in improved cosmesis if wound healing is complicated by infection, poor wound care, or delayed erythema  - Area cleansed with isopropryl Alcohol  - Area numbed with 1% lidocaine with 1:200,000 epinephrine  - Sterile bovie scratch pad used to abrade the epidermis and stop at the point of pinpoint bleeding from the papillary dermis  - ointment, telfa, and bandage applied  - Patient counseled to perform gentle wound care and keep ointment and a bandage on the area for at least 2 weeks, then begin silicone gel sheeting again to the area.         Daria Hedrick MD FAAD

## 2024-11-20 ENCOUNTER — CLINICAL SUPPORT (OUTPATIENT)
Dept: DERMATOLOGY | Facility: CLINIC | Age: 69
End: 2024-11-20
Payer: MEDICARE

## 2024-11-20 DIAGNOSIS — Z48.02 VISIT FOR SUTURE REMOVAL: Primary | ICD-10-CM

## 2024-11-20 PROCEDURE — 99024 POSTOP FOLLOW-UP VISIT: CPT | Mod: S$GLB,,, | Performed by: DERMATOLOGY

## 2024-12-03 NOTE — PROGRESS NOTES
Mohs Surgery Suture Removal Note   Patient Name: Katerina Dickson  Patient : 1955  Date of Service: 2024    CC: Pt. Presents for removal of sutures on the right cheek today  Subjective: patient reports none  wound healing as expected     Objective: Wound well healed, sutures clean/dry/intact. No evidence of any complications noted.     Visit For Suture Removal:  - Suture removal today  - Steri strips/mastisol were not applied  - Patient counseled on silicone gel/silicone sheeting and their use in the management of post-operative scars  - Handout on silicone gel/silicone gel sheeting was provided  - Patient to follow up with their referring provider in 3 months for further skin evaluation    I DID NOT PERFORM THE SUTURE REMOVAL, DID NOT SEE PATIENT. DONE BY CHAN OCONNELL    [facility]

## 2024-12-06 ENCOUNTER — PATIENT MESSAGE (OUTPATIENT)
Dept: ORTHOPEDICS | Facility: CLINIC | Age: 69
End: 2024-12-06

## 2024-12-06 ENCOUNTER — OFFICE VISIT (OUTPATIENT)
Dept: ORTHOPEDICS | Facility: CLINIC | Age: 69
End: 2024-12-06
Payer: MEDICARE

## 2024-12-06 VITALS
BODY MASS INDEX: 22.14 KG/M2 | WEIGHT: 149.5 LBS | DIASTOLIC BLOOD PRESSURE: 86 MMHG | HEART RATE: 71 BPM | SYSTOLIC BLOOD PRESSURE: 141 MMHG | HEIGHT: 69 IN

## 2024-12-06 DIAGNOSIS — M70.61 GREATER TROCHANTERIC BURSITIS OF RIGHT HIP: Primary | ICD-10-CM

## 2024-12-06 PROCEDURE — 3288F FALL RISK ASSESSMENT DOCD: CPT | Mod: CPTII,S$GLB,, | Performed by: NURSE PRACTITIONER

## 2024-12-06 PROCEDURE — 1101F PT FALLS ASSESS-DOCD LE1/YR: CPT | Mod: CPTII,S$GLB,, | Performed by: NURSE PRACTITIONER

## 2024-12-06 PROCEDURE — 1125F AMNT PAIN NOTED PAIN PRSNT: CPT | Mod: CPTII,S$GLB,, | Performed by: NURSE PRACTITIONER

## 2024-12-06 PROCEDURE — 1160F RVW MEDS BY RX/DR IN RCRD: CPT | Mod: CPTII,S$GLB,, | Performed by: NURSE PRACTITIONER

## 2024-12-06 PROCEDURE — 99999 PR PBB SHADOW E&M-EST. PATIENT-LVL III: CPT | Mod: PBBFAC,,, | Performed by: NURSE PRACTITIONER

## 2024-12-06 PROCEDURE — 1159F MED LIST DOCD IN RCRD: CPT | Mod: CPTII,S$GLB,, | Performed by: NURSE PRACTITIONER

## 2024-12-06 PROCEDURE — 1157F ADVNC CARE PLAN IN RCRD: CPT | Mod: CPTII,S$GLB,, | Performed by: NURSE PRACTITIONER

## 2024-12-06 PROCEDURE — 3077F SYST BP >= 140 MM HG: CPT | Mod: CPTII,S$GLB,, | Performed by: NURSE PRACTITIONER

## 2024-12-06 PROCEDURE — 99212 OFFICE O/P EST SF 10 MIN: CPT | Mod: 25,S$GLB,, | Performed by: NURSE PRACTITIONER

## 2024-12-06 PROCEDURE — 3008F BODY MASS INDEX DOCD: CPT | Mod: CPTII,S$GLB,, | Performed by: NURSE PRACTITIONER

## 2024-12-06 PROCEDURE — 3044F HG A1C LEVEL LT 7.0%: CPT | Mod: CPTII,S$GLB,, | Performed by: NURSE PRACTITIONER

## 2024-12-06 PROCEDURE — 3079F DIAST BP 80-89 MM HG: CPT | Mod: CPTII,S$GLB,, | Performed by: NURSE PRACTITIONER

## 2024-12-06 PROCEDURE — 20610 DRAIN/INJ JOINT/BURSA W/O US: CPT | Mod: RT,S$GLB,, | Performed by: NURSE PRACTITIONER

## 2024-12-06 RX ADMIN — TRIAMCINOLONE ACETONIDE 40 MG: 40 INJECTION, SUSPENSION INTRA-ARTICULAR; INTRAMUSCULAR at 11:12

## 2024-12-18 DIAGNOSIS — M18.11 PRIMARY OSTEOARTHRITIS OF FIRST CARPOMETACARPAL JOINT OF RIGHT HAND: ICD-10-CM

## 2024-12-18 RX ORDER — MELOXICAM 15 MG/1
15 TABLET ORAL
Qty: 90 TABLET | Refills: 3 | Status: SHIPPED | OUTPATIENT
Start: 2024-12-18

## 2024-12-18 RX ORDER — TRIAMCINOLONE ACETONIDE 40 MG/ML
40 INJECTION, SUSPENSION INTRA-ARTICULAR; INTRAMUSCULAR
Status: DISCONTINUED | OUTPATIENT
Start: 2024-12-06 | End: 2024-12-19 | Stop reason: HOSPADM

## 2024-12-18 NOTE — TELEPHONE ENCOUNTER
No care due was identified.  [facility] Embedded Care Due Messages. Reference number: [id number].   12/18/2024 7:42:40 AM CST

## 2024-12-18 NOTE — TELEPHONE ENCOUNTER
Refill Routing Note   Medication(s) are not appropriate for processing by [facility] for the following reason(s):        Outside of protocol    [facility] action(s):  Route               Appointments  past 12m or future 3m with PCP    Date Provider   Last Visit   10/31/2024 Kaitlyn Butler, DO   Next Visit   5/5/2025 Kaitlyn Butler, DO   ED visits in past 90 days: 0        Note composed:9:03 AM 12/18/2024

## 2024-12-19 NOTE — PROGRESS NOTES
Chief Complaint   Patient presents with    Right Hip - Pain      HPI:   This is a 69 y.o. who presents to clinic today for right hip pain for the past 4 weeks after no known trauma. Complains of pain while sleeping on side and when descending stairs. Pain is dull and deep. No numbness or tingling. No associated signs or symptoms.      Past Medical History:   Diagnosis Date    Basal cell carcinoma     BRCA1 negative     BRCA2 negative     GERD (gastroesophageal reflux disease)     Hashimoto's thyroiditis 06/2007    Hypothyroidism     Pancreatitis 1988,1995    Postmenopausal HRT (hormone replacement therapy)      Past Surgical History:   Procedure Laterality Date    CHOLECYSTECTOMY  2006    COLONOSCOPY  2012    every 5    COLONOSCOPY N/A 10/23/2017    Procedure: COLONOSCOPY;  Surgeon: Pankaj Shah MD;  Location: [facility] ENDO;  Service: Endoscopy;  Laterality: N/A;    COLONOSCOPY N/A 6/6/2023    Procedure: COLONOSCOPY;  Surgeon: Pankaj Shah MD;  Location: [facility] ENDO;  Service: Endoscopy;  Laterality: N/A;    ESOPHAGOGASTRODUODENOSCOPY      EYE SURGERY Bilateral     cataract    HYSTERECTOMY  1995    heavy periods    OOPHORECTOMY       Current Outpatient Medications on File Prior to Visit   Medication Sig Dispense Refill    atorvastatin (LIPITOR) 10 MG tablet Take 1 tablet (10 mg total) by mouth once daily. 90 tablet 3    BIOTIN ORAL Take by mouth Daily.      buPROPion (WELLBUTRIN XL) 300 MG 24 hr tablet Take 1 tablet (300 mg total) by mouth once daily. 90 tablet 3    DOXYLAMINE SUCCINATE (UNISOM ORAL) Take 100 mg by mouth every evening. 2 tablets nightly      estradioL (VIVELLE-DOT) 0.075 mg/24 hr APPLY 1 PATCH TOPICALLY TO THE SKIN 2 TIMES A WEEK 24 patch 3    gabapentin (NEURONTIN) 300 MG capsule Take 1 capsule (300 mg total) by mouth 2 (two) times daily. 180 capsule 2    levothyroxine (SYNTHROID) 100 MCG tablet Take 1 tablet (100 mcg total) by mouth once daily. 90 tablet 3    [DISCONTINUED] meloxicam  (MOBIC) 15 MG tablet Take 1 tablet (15 mg total) by mouth once daily. 90 tablet 3     No current facility-administered medications on file prior to visit.     Review of patient's allergies indicates:  No Known Allergies  Family History   Problem Relation Name Age of Onset    Breast cancer Mother  38         at 39 years of age    Arthritis Father      Breast cancer Sister  69    Breast cancer Maternal Grandmother      Heart disease Neg Hx       Social History     Socioeconomic History    Marital status:    Tobacco Use    Smoking status: Former     Current packs/day: 0.00     Types: Cigarettes     Quit date: 1995     Years since quittin.3    Smokeless tobacco: Never   Substance and Sexual Activity    Alcohol use: Yes     Alcohol/week: 9.0 standard drinks of alcohol     Types: 1 Glasses of wine, 1 Shots of liquor, 7 Standard drinks or equivalent per week     Comment: 2-3 drinks per day    Drug use: No    Sexual activity: Yes     Partners: Male     Birth control/protection: Surgical     Social Drivers of Health     Financial Resource Strain: Patient Declined (2024)    Overall Financial Resource Strain (CARDIA)     Difficulty of Paying Living Expenses: Patient declined   Food Insecurity: Patient Declined (2024)    Hunger Vital Sign     Worried About Running Out of Food in the Last Year: Patient declined     Ran Out of Food in the Last Year: Patient declined   Physical Activity: Unknown (2024)    Exercise Vital Sign     Days of Exercise per Week: Patient declined   Housing Stability: Unknown (2024)    Housing Stability Vital Sign     Unable to Pay for Housing in the Last Year: Patient declined       Review of Systems:  Constitutional:  Denies fever or chills   Eyes:  Denies change in visual acuity   HENT:  Denies nasal congestion or sore throat   Respiratory:  Denies cough or shortness of breath   Cardiovascular:  Denies chest pain or edema   GI:  Denies abdominal pain, nausea,  vomiting, bloody stools or diarrhea   :  Denies dysuria   Integument:  Denies rash   Neurologic:  Denies headache, focal weakness or sensory changes   Endocrine:  Denies polyuria or polydipsia   Lymphatic:  Denies swollen glands   Psychiatric:  Denies depression or anxiety     Physical Exam:   Constitutional:  Well developed, well nourished, no acute distress, non-toxic appearance   Integument:  Well hydrated, no rash   Lymphatic:  No lymphadenopathy noted   Neurologic:  Alert & oriented x 3  Psychiatric:  Speech and behavior appropriate     Bilateral Hip Exam    left Hip Exam   left hip exam performed same as contralateral hip and is normal.     right Hip Exam   Tenderness   The patient is experiencing tenderness in the greater trochanter.  Range of Motion   The patient has normal hip ROM.  Muscle Strength   Abduction: 4/5   Other   Erythema: absent  Sensation: normal  Pulse: present        Greater trochanteric bursitis of right hip  -     Large Joint Aspiration/Injection: R greater trochanteric bursa  -     triamcinolone acetonide injection 40 mg           Using an aseptic technique, I injected 5 cc of lidocaine 1% without and 1 cc of kenalog 40mg into the right Hip. The patient tolerated this well. I will have them return to clinic in 6 weeks.

## 2024-12-19 NOTE — PROCEDURES
Large Joint Aspiration/Injection: R greater trochanteric bursa    Date/Time: 12/6/2024 11:20 AM    Performed by: Xi Castro FNP  Authorized by: Xi Castro FNP    Consent Done?:  Yes (Verbal)  Indications:  Pain  Timeout: prior to procedure the correct patient, procedure, and site was verified    Prep: patient was prepped and draped in usual sterile fashion      Local anesthesia used?: Yes    Local anesthetic:  Lidocaine 1% without epinephrine  Anesthetic total (ml):  5      Details:  Needle Size:  22 G  Approach:  Lateral  Location:  Hip  Site:  R greater trochanteric bursa  Medications:  40 mg triamcinolone acetonide 40 mg/mL  Patient tolerance:  Patient tolerated the procedure well with no immediate complications

## 2025-01-03 ENCOUNTER — OFFICE VISIT (OUTPATIENT)
Dept: ORTHOPEDICS | Facility: CLINIC | Age: 70
End: 2025-01-03
Payer: MEDICARE

## 2025-01-03 VITALS
SYSTOLIC BLOOD PRESSURE: 141 MMHG | DIASTOLIC BLOOD PRESSURE: 86 MMHG | HEIGHT: 69 IN | BODY MASS INDEX: 22.14 KG/M2 | HEART RATE: 71 BPM | WEIGHT: 149.5 LBS

## 2025-01-03 DIAGNOSIS — M70.61 GREATER TROCHANTERIC BURSITIS OF RIGHT HIP: Primary | ICD-10-CM

## 2025-01-03 PROCEDURE — 99999 PR PBB SHADOW E&M-EST. PATIENT-LVL III: CPT | Mod: PBBFAC,,, | Performed by: NURSE PRACTITIONER

## 2025-01-03 RX ORDER — TRIAMCINOLONE ACETONIDE 40 MG/ML
40 INJECTION, SUSPENSION INTRA-ARTICULAR; INTRAMUSCULAR
Status: DISCONTINUED | OUTPATIENT
Start: 2025-01-03 | End: 2025-01-03 | Stop reason: HOSPADM

## 2025-01-03 RX ADMIN — TRIAMCINOLONE ACETONIDE 40 MG: 40 INJECTION, SUSPENSION INTRA-ARTICULAR; INTRAMUSCULAR at 10:01

## 2025-01-03 NOTE — PROGRESS NOTES
Chief Complaint   Patient presents with    Right Hip - Pain      HPI:   This is a 69 y.o. who presents to clinic today for right hip pain for the past 1 weeks after no known trauma. Complains of pain while sleeping on side and when descending stairs. Pain is dull and deep. No numbness or tingling. No associated signs or symptoms.      Past Medical History:   Diagnosis Date    Basal cell carcinoma     BRCA1 negative     BRCA2 negative     GERD (gastroesophageal reflux disease)     Hashimoto's thyroiditis 06/2007    Hypothyroidism     Pancreatitis 1988,1995    Postmenopausal HRT (hormone replacement therapy)      Past Surgical History:   Procedure Laterality Date    CHOLECYSTECTOMY  2006    COLONOSCOPY  2012    every 5    COLONOSCOPY N/A 10/23/2017    Procedure: COLONOSCOPY;  Surgeon: Pankaj Shah MD;  Location: [facility] ENDO;  Service: Endoscopy;  Laterality: N/A;    COLONOSCOPY N/A 6/6/2023    Procedure: COLONOSCOPY;  Surgeon: Pankaj Shah MD;  Location: [facility] ENDO;  Service: Endoscopy;  Laterality: N/A;    ESOPHAGOGASTRODUODENOSCOPY      EYE SURGERY Bilateral     cataract    HYSTERECTOMY  1995    heavy periods    OOPHORECTOMY       Current Outpatient Medications on File Prior to Visit   Medication Sig Dispense Refill    atorvastatin (LIPITOR) 10 MG tablet Take 1 tablet (10 mg total) by mouth once daily. 90 tablet 3    BIOTIN ORAL Take by mouth Daily.      buPROPion (WELLBUTRIN XL) 300 MG 24 hr tablet Take 1 tablet (300 mg total) by mouth once daily. 90 tablet 3    DOXYLAMINE SUCCINATE (UNISOM ORAL) Take 100 mg by mouth every evening. 2 tablets nightly      estradioL (VIVELLE-DOT) 0.075 mg/24 hr APPLY 1 PATCH TOPICALLY TO THE SKIN 2 TIMES A WEEK 24 patch 3    gabapentin (NEURONTIN) 300 MG capsule Take 1 capsule (300 mg total) by mouth 2 (two) times daily. 180 capsule 2    levothyroxine (SYNTHROID) 100 MCG tablet Take 1 tablet (100 mcg total) by mouth once daily. 90 tablet 3    meloxicam (MOBIC) 15 MG  tablet TAKE 1 TABLET(15 MG) BY MOUTH EVERY DAY 90 tablet 3     No current facility-administered medications on file prior to visit.     Review of patient's allergies indicates:  No Known Allergies  Family History   Problem Relation Name Age of Onset    Breast cancer Mother  38         at 39 years of age    Arthritis Father      Breast cancer Sister  69    Breast cancer Maternal Grandmother      Heart disease Neg Hx       Social History     Socioeconomic History    Marital status:    Tobacco Use    Smoking status: Former     Current packs/day: 0.00     Types: Cigarettes     Quit date: 1995     Years since quittin.3    Smokeless tobacco: Never   Substance and Sexual Activity    Alcohol use: Yes     Alcohol/week: 9.0 standard drinks of alcohol     Types: 1 Glasses of wine, 1 Shots of liquor, 7 Standard drinks or equivalent per week     Comment: 2-3 drinks per day    Drug use: No    Sexual activity: Yes     Partners: Male     Birth control/protection: Surgical     Social Drivers of Health     Financial Resource Strain: Patient Declined (2024)    Overall Financial Resource Strain (CARDIA)     Difficulty of Paying Living Expenses: Patient declined   Food Insecurity: Patient Declined (2024)    Hunger Vital Sign     Worried About Running Out of Food in the Last Year: Patient declined     Ran Out of Food in the Last Year: Patient declined   Physical Activity: Unknown (2024)    Exercise Vital Sign     Days of Exercise per Week: Patient declined   Housing Stability: Unknown (2024)    Housing Stability Vital Sign     Unable to Pay for Housing in the Last Year: Patient declined       Review of Systems:  Constitutional:  Denies fever or chills   Eyes:  Denies change in visual acuity   HENT:  Denies nasal congestion or sore throat   Respiratory:  Denies cough or shortness of breath   Cardiovascular:  Denies chest pain or edema   GI:  Denies abdominal pain, nausea, vomiting, bloody stools or  diarrhea   :  Denies dysuria   Integument:  Denies rash   Neurologic:  Denies headache, focal weakness or sensory changes   Endocrine:  Denies polyuria or polydipsia   Lymphatic:  Denies swollen glands   Psychiatric:  Denies depression or anxiety     Physical Exam:   Constitutional:  Well developed, well nourished, no acute distress, non-toxic appearance   Integument:  Well hydrated, no rash   Lymphatic:  No lymphadenopathy noted   Neurologic:  Alert & oriented x 3  Psychiatric:  Speech and behavior appropriate     Bilateral Hip Exam    left Hip Exam   left hip exam performed same as contralateral hip and is normal.     right Hip Exam   Tenderness   The patient is experiencing tenderness in the greater trochanter.  Range of Motion   The patient has normal hip ROM.  Muscle Strength   Abduction: 4/5   Other   Erythema: absent  Sensation: normal  Pulse: present      Greater trochanteric bursitis of right hip  -     Large Joint Aspiration/Injection: R greater trochanteric bursa  -     triamcinolone acetonide injection 40 mg    Using an aseptic technique, I injected 5 cc of lidocaine 1% without and 1 cc of kenalog 40mg into the right Hip. The patient tolerated this well. I will have them return to clinic in 6 weeks or as needed.

## 2025-01-03 NOTE — PROCEDURES
Large Joint Aspiration/Injection: R greater trochanteric bursa    Date/Time: 1/3/2025 10:40 AM    Performed by: Xi Castro FNP  Authorized by: Xi Castro FNP    Consent Done?:  Yes (Verbal)  Indications:  Pain  Timeout: prior to procedure the correct patient, procedure, and site was verified    Prep: patient was prepped and draped in usual sterile fashion      Local anesthesia used?: Yes    Local anesthetic:  Lidocaine 1% without epinephrine  Anesthetic total (ml):  5      Details:  Needle Size:  22 G  Approach:  Lateral  Location:  Hip  Site:  R greater trochanteric bursa  Medications:  40 mg triamcinolone acetonide 40 mg/mL  Patient tolerance:  Patient tolerated the procedure well with no immediate complications

## 2025-01-15 ENCOUNTER — HOSPITAL ENCOUNTER (OUTPATIENT)
Dept: RADIOLOGY | Facility: HOSPITAL | Age: 70
Discharge: HOME | End: 2025-01-15
Attending: INTERNAL MEDICINE
Payer: MEDICARE

## 2025-01-15 DIAGNOSIS — Z12.31 ENCOUNTER FOR SCREENING MAMMOGRAM FOR MALIGNANT NEOPLASM OF BREAST: ICD-10-CM

## 2025-01-15 PROCEDURE — 77067 SCR MAMMO BI INCL CAD: CPT | Mod: 26,,, | Performed by: RADIOLOGY

## 2025-01-15 PROCEDURE — 77063 BREAST TOMOSYNTHESIS BI: CPT | Mod: 26,,, | Performed by: RADIOLOGY

## 2025-01-15 PROCEDURE — 77067 SCR MAMMO BI INCL CAD: CPT | Mod: TC,PN

## 2025-01-28 ENCOUNTER — OFFICE VISIT (OUTPATIENT)
Dept: FAMILY MEDICINE | Facility: CLINIC | Age: 70
End: 2025-01-28
Payer: MEDICARE

## 2025-01-28 VITALS
WEIGHT: 153.69 LBS | OXYGEN SATURATION: 96 % | HEIGHT: 69 IN | HEART RATE: 75 BPM | SYSTOLIC BLOOD PRESSURE: 134 MMHG | DIASTOLIC BLOOD PRESSURE: 76 MMHG | BODY MASS INDEX: 22.76 KG/M2 | RESPIRATION RATE: 18 BRPM | TEMPERATURE: 97 F

## 2025-01-28 DIAGNOSIS — J01.00 ACUTE MAXILLARY SINUSITIS, RECURRENCE NOT SPECIFIED: ICD-10-CM

## 2025-01-28 DIAGNOSIS — Z79.890 POSTMENOPAUSAL HRT (HORMONE REPLACEMENT THERAPY): ICD-10-CM

## 2025-01-28 DIAGNOSIS — R51.9 SINUS HEADACHE: Primary | ICD-10-CM

## 2025-01-28 PROCEDURE — 1160F RVW MEDS BY RX/DR IN RCRD: CPT | Mod: CPTII,S$GLB,, | Performed by: NURSE PRACTITIONER

## 2025-01-28 PROCEDURE — 1125F AMNT PAIN NOTED PAIN PRSNT: CPT | Mod: CPTII,S$GLB,, | Performed by: NURSE PRACTITIONER

## 2025-01-28 PROCEDURE — 1159F MED LIST DOCD IN RCRD: CPT | Mod: CPTII,S$GLB,, | Performed by: NURSE PRACTITIONER

## 2025-01-28 PROCEDURE — 1157F ADVNC CARE PLAN IN RCRD: CPT | Mod: CPTII,S$GLB,, | Performed by: NURSE PRACTITIONER

## 2025-01-28 PROCEDURE — 3008F BODY MASS INDEX DOCD: CPT | Mod: CPTII,S$GLB,, | Performed by: NURSE PRACTITIONER

## 2025-01-28 PROCEDURE — 3288F FALL RISK ASSESSMENT DOCD: CPT | Mod: CPTII,S$GLB,, | Performed by: NURSE PRACTITIONER

## 2025-01-28 PROCEDURE — 1101F PT FALLS ASSESS-DOCD LE1/YR: CPT | Mod: CPTII,S$GLB,, | Performed by: NURSE PRACTITIONER

## 2025-01-28 PROCEDURE — 96372 THER/PROPH/DIAG INJ SC/IM: CPT | Mod: S$GLB,,, | Performed by: NURSE PRACTITIONER

## 2025-01-28 PROCEDURE — 3075F SYST BP GE 130 - 139MM HG: CPT | Mod: CPTII,S$GLB,, | Performed by: NURSE PRACTITIONER

## 2025-01-28 PROCEDURE — 3078F DIAST BP <80 MM HG: CPT | Mod: CPTII,S$GLB,, | Performed by: NURSE PRACTITIONER

## 2025-01-28 PROCEDURE — 99213 OFFICE O/P EST LOW 20 MIN: CPT | Mod: 25,S$GLB,, | Performed by: NURSE PRACTITIONER

## 2025-01-28 RX ORDER — ESTRADIOL 0.07 MG/D
FILM, EXTENDED RELEASE TRANSDERMAL
Qty: 24 PATCH | Refills: 3 | OUTPATIENT
Start: 2025-01-28

## 2025-01-28 RX ORDER — DEXAMETHASONE SODIUM PHOSPHATE 4 MG/ML
4 INJECTION, SOLUTION INTRA-ARTICULAR; INTRALESIONAL; INTRAMUSCULAR; INTRAVENOUS; SOFT TISSUE
Status: DISCONTINUED | OUTPATIENT
Start: 2025-01-28 | End: 2025-01-28

## 2025-01-28 RX ORDER — DEXAMETHASONE SODIUM PHOSPHATE 4 MG/ML
4 INJECTION, SOLUTION INTRA-ARTICULAR; INTRALESIONAL; INTRAMUSCULAR; INTRAVENOUS; SOFT TISSUE
Status: COMPLETED | OUTPATIENT
Start: 2025-01-28 | End: 2025-01-28

## 2025-01-28 RX ORDER — AMOXICILLIN AND CLAVULANATE POTASSIUM 875; 125 MG/1; MG/1
1 TABLET, FILM COATED ORAL 2 TIMES DAILY
Qty: 20 TABLET | Refills: 0 | Status: SHIPPED | OUTPATIENT
Start: 2025-01-28 | End: 2025-02-07

## 2025-01-28 RX ADMIN — DEXAMETHASONE SODIUM PHOSPHATE 4 MG: 4 INJECTION, SOLUTION INTRA-ARTICULAR; INTRALESIONAL; INTRAMUSCULAR; INTRAVENOUS; SOFT TISSUE at 01:01

## 2025-01-28 NOTE — PROGRESS NOTES
Subjective:       Patient ID: Katerina Dickson is a 69 y.o. female.    Chief Complaint: Headache (Sinus headaches on and off since [place])    Headache   Associated symptoms include sinus pressure. Pertinent negatives include no abdominal pain, back pain, coughing, fever, nausea or vomiting.     Sinus pressure headaches off and on for the past month. States pressure across upper nose, behind eyes. Worse the past week. Taking decongestant. States nasal mucous is bloody. Pressure ears. States symptoms will not improve and go away. See ROS    The following portion of the patients history was reviewed and updated as appropriate: allergies, current medications, past medical and surgical history. Past social history and problem list reviewed. Family PMH and Past social history reviewed. Tobacco, Illicit drug use reviewed.      Review of patient's allergies indicates:  No Known Allergies      Current Outpatient Medications:     atorvastatin (LIPITOR) 10 MG tablet, Take 1 tablet (10 mg total) by mouth once daily., Disp: 90 tablet, Rfl: 3    BIOTIN ORAL, Take by mouth Daily., Disp: , Rfl:     buPROPion (WELLBUTRIN XL) 300 MG 24 hr tablet, Take 1 tablet (300 mg total) by mouth once daily., Disp: 90 tablet, Rfl: 3    DOXYLAMINE SUCCINATE (UNISOM ORAL), Take 100 mg by mouth every evening. 2 tablets nightly, Disp: , Rfl:     estradioL (VIVELLE-DOT) 0.075 mg/24 hr, APPLY 1 PATCH TOPICALLY TO THE SKIN 2 TIMES A WEEK, Disp: 24 patch, Rfl: 3    gabapentin (NEURONTIN) 300 MG capsule, Take 1 capsule (300 mg total) by mouth 2 (two) times daily., Disp: 180 capsule, Rfl: 2    levothyroxine (SYNTHROID) 100 MCG tablet, Take 1 tablet (100 mcg total) by mouth once daily., Disp: 90 tablet, Rfl: 3    meloxicam (MOBIC) 15 MG tablet, TAKE 1 TABLET(15 MG) BY MOUTH EVERY DAY, Disp: 90 tablet, Rfl: 3    Past Medical History:   Diagnosis Date    Basal cell carcinoma     BRCA1 negative     BRCA2 negative     GERD (gastroesophageal reflux disease)      Hashimoto's thyroiditis 2007    Hypothyroidism     Pancreatitis ,    Postmenopausal HRT (hormone replacement therapy)        Past Surgical History:   Procedure Laterality Date    CHOLECYSTECTOMY      COLONOSCOPY  2012    every 5    COLONOSCOPY N/A 10/23/2017    Procedure: COLONOSCOPY;  Surgeon: Pankaj Shah MD;  Location: [facility] ENDO;  Service: Endoscopy;  Laterality: N/A;    COLONOSCOPY N/A 2023    Procedure: COLONOSCOPY;  Surgeon: Pankaj Shah MD;  Location: [facility] ENDO;  Service: Endoscopy;  Laterality: N/A;    ESOPHAGOGASTRODUODENOSCOPY      EYE SURGERY Bilateral     cataract    HYSTERECTOMY      heavy periods    OOPHORECTOMY         Social History     Socioeconomic History    Marital status:    Tobacco Use    Smoking status: Former     Current packs/day: 0.00     Types: Cigarettes     Quit date: 1995     Years since quittin.4    Smokeless tobacco: Never   Substance and Sexual Activity    Alcohol use: Yes     Alcohol/week: 9.0 standard drinks of alcohol     Types: 1 Glasses of wine, 1 Shots of liquor, 7 Standard drinks or equivalent per week     Comment: 2-3 drinks per day    Drug use: No    Sexual activity: Yes     Partners: Male     Birth control/protection: Surgical     Social Drivers of Health     Financial Resource Strain: Patient Declined (2024)    Overall Financial Resource Strain (CARDIA)     Difficulty of Paying Living Expenses: Patient declined   Food Insecurity: Patient Declined (2024)    Hunger Vital Sign     Worried About Running Out of Food in the Last Year: Patient declined     Ran Out of Food in the Last Year: Patient declined   Physical Activity: Unknown (2024)    Exercise Vital Sign     Days of Exercise per Week: Patient declined   Housing Stability: Unknown (2024)    Housing Stability Vital Sign     Unable to Pay for Housing in the Last Year: Patient declined     Review of Systems   Constitutional:  Negative for fatigue and  "fever.   HENT:  Positive for congestion (bloody mucous), postnasal drip and sinus pressure.         Decongestant.    Eyes:  Negative for visual disturbance.   Respiratory:  Negative for cough, chest tightness, shortness of breath and wheezing.    Cardiovascular:  Negative for chest pain, palpitations and leg swelling.   Gastrointestinal:  Negative for abdominal pain, blood in stool, diarrhea, nausea and vomiting.   Genitourinary: Negative.    Musculoskeletal:  Negative for arthralgias, back pain and gait problem.   Skin: Negative.    Neurological:  Positive for headaches (sinus headache).       Objective:      /76 (BP Location: Left arm, Patient Position: Sitting)   Pulse 75   Temp 96.8 °F (36 °C)   Resp 18   Ht 5' 9" (1.753 m)   Wt 69.7 kg (153 lb 10.6 oz)   SpO2 96%   BMI 22.69 kg/m²      Physical Exam  Constitutional:       Appearance: Normal appearance. She is normal weight.   HENT:      Head: Normocephalic.      Right Ear: Ear canal and external ear normal. A middle ear effusion is present.      Left Ear: Ear canal and external ear normal. A middle ear effusion is present.      Nose: Congestion and rhinorrhea present.      Right Sinus: Maxillary sinus tenderness present.      Left Sinus: Maxillary sinus tenderness present.      Mouth/Throat:      Mouth: Mucous membranes are moist.      Pharynx: Postnasal drip present. No posterior oropharyngeal erythema.   Eyes:      Pupils: Pupils are equal, round, and reactive to light.   Cardiovascular:      Rate and Rhythm: Normal rate and regular rhythm.      Pulses: Normal pulses.      Heart sounds: Normal heart sounds. No murmur heard.  Pulmonary:      Effort: Pulmonary effort is normal.      Breath sounds: Normal breath sounds. No decreased breath sounds or wheezing.   Musculoskeletal:         General: Normal range of motion.      Cervical back: Normal range of motion.      Comments: Gait normal.  strong, equal   Skin:     General: Skin is warm and " dry.      Capillary Refill: Capillary refill takes less than 2 seconds.   Neurological:      General: No focal deficit present.      Mental Status: She is alert.   Psychiatric:         Attention and Perception: Attention and perception normal.         Mood and Affect: Mood and affect normal.         Speech: Speech normal.         Behavior: Behavior normal.         Assessment:       1. Sinus headache    2. Acute maxillary sinusitis, recurrence not specified        Plan:       Sinus headache  -       dexAMETHasone injection 4 mg:  Risks and benefits discussed. Potential side effects such as anxiety, palpitations and flushing discussed. May increase blood glucose levels over the next 48 hours. Potential for skin atrophy at injection site. Tolerated injection well.    Acute maxillary sinusitis, recurrence not specified: Due to duration and presenting exam will cover with antibiotics. Take as directed. Complete full course of medication.    Other orders  -     amoxicillin-clavulanate 875-125mg (AUGMENTIN) 875-125 mg per tablet; Take 1 tablet by mouth 2 (two) times daily. for 10 days  Dispense: 20 tablet; Refill: 0       Continue current medication  Take medications only as prescribed  Healthy diet, exercise  Adequate rest  Adequate hydration  Avoid allergens  Avoid excessive caffeine     Follow up if not improving

## 2025-01-28 NOTE — TELEPHONE ENCOUNTER
Medication refused due to failing protocol.    Requested Prescriptions   Pending Prescriptions Disp Refills    estradioL (VIVELLE-DOT) 0.075 mg/24 hr [Pharmacy Med Name: ESTRADIOL 0.075MG PATCH (TWICE WK)] 24 patch 3     Sig: APPLY 1 PATCH TOPICALLY TO THE SKIN 2 TIMES A WEEK        OB/GYN: Hormone Replacement Therapy Passed - 1/28/2025  2:33 PM        Passed - Patient is at least 18 years old        Passed - No positive pregnancy tests documented in last 360 days        Passed - Patient is not an active smoker        Passed - No contraindicated diagnoses on problem list        Passed - Mammography is up-to-date per Health Maintenance        Passed - Last BP in normal range within 360 days     BP Readings from Last 3 Encounters:   01/28/25 134/76   01/03/25 (!) 141/86   12/06/24 (!) 141/86               Passed - Matches previous order       Previous Authorizing Provider: Carla Moore MD (estradioL (VIVELLE-DOT) 0.075 mg/24 hr)  Previous Pharmacy: [id number] [place] [address] AT [facility] [address]            Passed - Valid OB/GYN Encounter within 15 months     Recent Visits  Date Type Provider Dept   01/30/24 Office Visit Carla Moore MD [facility]   Showing recent visits within past 720 days and meeting all other requirements  Future Appointments  No visits were found meeting these conditions.  Showing future appointments within next 150 days and meeting all other requirements      Future Appointments              In 3 weeks Nirmala Hui MD [place] - Dermatology, [facility]    In 1 month [facility] DEXA1 [facility] - [facility], [facility]    In 3 months LAB, [facility] - Lab, [facility]    In 3 months Kaitlyn Butler DO [place] - [facility]

## 2025-01-29 RX ORDER — ESTRADIOL 0.07 MG/D
FILM, EXTENDED RELEASE TRANSDERMAL
Qty: 24 PATCH | Refills: 3 | Status: SHIPPED | OUTPATIENT
Start: 2025-01-29

## 2025-01-30 DIAGNOSIS — Z00.00 ENCOUNTER FOR MEDICARE ANNUAL WELLNESS EXAM: ICD-10-CM

## 2025-02-05 ENCOUNTER — PATIENT MESSAGE (OUTPATIENT)
Dept: FAMILY MEDICINE | Facility: CLINIC | Age: 70
End: 2025-02-05
Payer: MEDICARE

## 2025-02-05 RX ORDER — PREDNISONE 20 MG/1
TABLET ORAL
Qty: 10 TABLET | Refills: 0 | Status: SHIPPED | OUTPATIENT
Start: 2025-02-05

## 2025-02-11 ENCOUNTER — OFFICE VISIT (OUTPATIENT)
Dept: OBSTETRICS AND GYNECOLOGY | Facility: CLINIC | Age: 70
End: 2025-02-11
Payer: MEDICARE

## 2025-02-11 VITALS
WEIGHT: 154.31 LBS | HEIGHT: 69 IN | DIASTOLIC BLOOD PRESSURE: 70 MMHG | SYSTOLIC BLOOD PRESSURE: 110 MMHG | BODY MASS INDEX: 22.85 KG/M2

## 2025-02-11 DIAGNOSIS — Z79.890 HORMONE REPLACEMENT THERAPY (HRT): Primary | ICD-10-CM

## 2025-02-11 DIAGNOSIS — Z79.890 POSTMENOPAUSAL HRT (HORMONE REPLACEMENT THERAPY): ICD-10-CM

## 2025-02-11 PROCEDURE — 1157F ADVNC CARE PLAN IN RCRD: CPT | Mod: CPTII,S$GLB,, | Performed by: OBSTETRICS & GYNECOLOGY

## 2025-02-11 PROCEDURE — 1159F MED LIST DOCD IN RCRD: CPT | Mod: CPTII,S$GLB,, | Performed by: OBSTETRICS & GYNECOLOGY

## 2025-02-11 PROCEDURE — 99999 PR PBB SHADOW E&M-EST. PATIENT-LVL II: CPT | Mod: PBBFAC,,, | Performed by: OBSTETRICS & GYNECOLOGY

## 2025-02-11 PROCEDURE — 3008F BODY MASS INDEX DOCD: CPT | Mod: CPTII,S$GLB,, | Performed by: OBSTETRICS & GYNECOLOGY

## 2025-02-11 PROCEDURE — 99214 OFFICE O/P EST MOD 30 MIN: CPT | Mod: S$GLB,,, | Performed by: OBSTETRICS & GYNECOLOGY

## 2025-02-11 PROCEDURE — 1101F PT FALLS ASSESS-DOCD LE1/YR: CPT | Mod: CPTII,S$GLB,, | Performed by: OBSTETRICS & GYNECOLOGY

## 2025-02-11 PROCEDURE — 1126F AMNT PAIN NOTED NONE PRSNT: CPT | Mod: CPTII,S$GLB,, | Performed by: OBSTETRICS & GYNECOLOGY

## 2025-02-11 PROCEDURE — 3288F FALL RISK ASSESSMENT DOCD: CPT | Mod: CPTII,S$GLB,, | Performed by: OBSTETRICS & GYNECOLOGY

## 2025-02-11 PROCEDURE — 3078F DIAST BP <80 MM HG: CPT | Mod: CPTII,S$GLB,, | Performed by: OBSTETRICS & GYNECOLOGY

## 2025-02-11 PROCEDURE — 3074F SYST BP LT 130 MM HG: CPT | Mod: CPTII,S$GLB,, | Performed by: OBSTETRICS & GYNECOLOGY

## 2025-02-11 RX ORDER — ESTRADIOL 0.1 MG/D
1 FILM, EXTENDED RELEASE TRANSDERMAL
Qty: 24 PATCH | Refills: 3 | Status: SHIPPED | OUTPATIENT
Start: 2025-02-13 | End: 2026-02-13

## 2025-02-11 NOTE — PROGRESS NOTES
Chief Complaint   Patient presents with    Medication Management       History of Present Illness: Katerina Dickson is a 69 y.o. female that presents today 2/11/2025 for   Chief Complaint   Patient presents with    Medication Management         Past Medical History:   Diagnosis Date    Basal cell carcinoma     BRCA1 negative     BRCA2 negative     GERD (gastroesophageal reflux disease)     Hashimoto's thyroiditis 06/2007    Hypothyroidism     Pancreatitis 1988,1995    Postmenopausal HRT (hormone replacement therapy)        Past Surgical History:   Procedure Laterality Date    CHOLECYSTECTOMY  2006    COLONOSCOPY  2012    every 5    COLONOSCOPY N/A 10/23/2017    Procedure: COLONOSCOPY;  Surgeon: Pankaj Shah MD;  Location: [facility] ENDO;  Service: Endoscopy;  Laterality: N/A;    COLONOSCOPY N/A 6/6/2023    Procedure: COLONOSCOPY;  Surgeon: Pankaj Shah MD;  Location: [facility] ENDO;  Service: Endoscopy;  Laterality: N/A;    ESOPHAGOGASTRODUODENOSCOPY      EYE SURGERY Bilateral     cataract    HYSTERECTOMY  1995    heavy periods    OOPHORECTOMY         Outpatient Medications Prior to Visit   Medication Sig Dispense Refill    atorvastatin (LIPITOR) 10 MG tablet Take 1 tablet (10 mg total) by mouth once daily. 90 tablet 3    BIOTIN ORAL Take by mouth Daily.      buPROPion (WELLBUTRIN XL) 300 MG 24 hr tablet Take 1 tablet (300 mg total) by mouth once daily. 90 tablet 3    DOXYLAMINE SUCCINATE (UNISOM ORAL) Take 100 mg by mouth every evening. 2 tablets nightly      gabapentin (NEURONTIN) 300 MG capsule Take 1 capsule (300 mg total) by mouth 2 (two) times daily. 180 capsule 2    levothyroxine (SYNTHROID) 100 MCG tablet Take 1 tablet (100 mcg total) by mouth once daily. 90 tablet 3    meloxicam (MOBIC) 15 MG tablet TAKE 1 TABLET(15 MG) BY MOUTH EVERY DAY 90 tablet 3    predniSONE (DELTASONE) 20 MG tablet Take two tablets in am for 3 days, then one for 3 days then 1/2 for 2 days 10 tablet 0    estradioL (VIVELLE-DOT) 0.075  "mg/24 hr APPLY 1 PATCH TOPICALLY TO THE SKIN 2 TIMES A WEEK 24 patch 3     No facility-administered medications prior to visit.       Review of patient's allergies indicates:  No Known Allergies    Family History   Problem Relation Name Age of Onset    Breast cancer Mother  38         at 39 years of age    Arthritis Father      Breast cancer Sister  69    Breast cancer Maternal Grandmother      Heart disease Neg Hx         Social History     Tobacco Use    Smoking status: Former     Current packs/day: 0.00     Types: Cigarettes     Quit date: 1995     Years since quittin.4    Smokeless tobacco: Never   Substance Use Topics    Alcohol use: Yes     Alcohol/week: 9.0 standard drinks of alcohol     Types: 1 Glasses of wine, 1 Shots of liquor, 7 Standard drinks or equivalent per week     Comment: 2-3 drinks per day    Drug use: No       OB History    Para Term  AB Living   5 2 2   3 2   SAB IAB Ectopic Multiple Live Births   3       2      # Outcome Date GA Lbr Siddharth/2nd Weight Sex Type Anes PTL Lv   5 1989           4 1988           3 Term  40w0d  3.714 kg (8 lb 3 oz) F Vag-Spont None  LAZARA      Birth Comments: no complications   2 1984           1 Term  40w0d  3.345 kg (7 lb 6 oz) M Vag-Spont None  LAZARA      Birth Comments: no complications         /70   Ht 5' 9" (1.753 m)   Wt 70 kg (154 lb 5.2 oz)       ASSESSMENT/PLAN:  Hormone replacement therapy (HRT)  -     estradioL (VIVELLE-DOT) 0.1 mg/24 hr PTSW; Place 1 patch onto the skin twice a week.  Dispense: 24 patch; Refill: 3    Postmenopausal HRT (hormone replacement therapy)  -     estradioL (VIVELLE-DOT) 0.1 mg/24 hr PTSW; Place 1 patch onto the skin twice a week.  Dispense: 24 patch; Refill: 3      30 minutes spent today spent preparing reviewing previous external notes, reviewing previous results, performing medical examination, ordering tests or medications, counseling and documenting.       "

## 2025-02-13 ENCOUNTER — PATIENT MESSAGE (OUTPATIENT)
Dept: OBSTETRICS AND GYNECOLOGY | Facility: CLINIC | Age: 70
End: 2025-02-13
Payer: MEDICARE

## 2025-02-17 ENCOUNTER — PATIENT MESSAGE (OUTPATIENT)
Dept: OBSTETRICS AND GYNECOLOGY | Facility: CLINIC | Age: 70
End: 2025-02-17
Payer: MEDICARE

## 2025-02-24 ENCOUNTER — OFFICE VISIT (OUTPATIENT)
Dept: DERMATOLOGY | Facility: CLINIC | Age: 70
End: 2025-02-24
Payer: MEDICARE

## 2025-02-24 DIAGNOSIS — L82.1 SEBORRHEIC KERATOSIS: ICD-10-CM

## 2025-02-24 DIAGNOSIS — D18.01 CHERRY ANGIOMA: ICD-10-CM

## 2025-02-24 DIAGNOSIS — T14.8XXA EXCORIATION: ICD-10-CM

## 2025-02-24 DIAGNOSIS — D22.9 MULTIPLE BENIGN NEVI: ICD-10-CM

## 2025-02-24 DIAGNOSIS — L90.5 SCAR: ICD-10-CM

## 2025-02-24 DIAGNOSIS — Z85.828 HISTORY OF NONMELANOMA SKIN CANCER: ICD-10-CM

## 2025-02-24 DIAGNOSIS — D48.5 NEOPLASM OF UNCERTAIN BEHAVIOR OF SKIN: Primary | ICD-10-CM

## 2025-02-24 PROCEDURE — 1159F MED LIST DOCD IN RCRD: CPT | Mod: CPTII,S$GLB,, | Performed by: STUDENT IN AN ORGANIZED HEALTH CARE EDUCATION/TRAINING PROGRAM

## 2025-02-24 PROCEDURE — 11102 TANGNTL BX SKIN SINGLE LES: CPT | Mod: S$GLB,,, | Performed by: STUDENT IN AN ORGANIZED HEALTH CARE EDUCATION/TRAINING PROGRAM

## 2025-02-24 PROCEDURE — 99213 OFFICE O/P EST LOW 20 MIN: CPT | Mod: 25,S$GLB,, | Performed by: STUDENT IN AN ORGANIZED HEALTH CARE EDUCATION/TRAINING PROGRAM

## 2025-02-24 PROCEDURE — 1157F ADVNC CARE PLAN IN RCRD: CPT | Mod: CPTII,S$GLB,, | Performed by: STUDENT IN AN ORGANIZED HEALTH CARE EDUCATION/TRAINING PROGRAM

## 2025-02-24 PROCEDURE — 1126F AMNT PAIN NOTED NONE PRSNT: CPT | Mod: CPTII,S$GLB,, | Performed by: STUDENT IN AN ORGANIZED HEALTH CARE EDUCATION/TRAINING PROGRAM

## 2025-02-24 PROCEDURE — 88305 TISSUE EXAM BY PATHOLOGIST: CPT | Performed by: PATHOLOGY

## 2025-02-24 PROCEDURE — 1101F PT FALLS ASSESS-DOCD LE1/YR: CPT | Mod: CPTII,S$GLB,, | Performed by: STUDENT IN AN ORGANIZED HEALTH CARE EDUCATION/TRAINING PROGRAM

## 2025-02-24 PROCEDURE — 3288F FALL RISK ASSESSMENT DOCD: CPT | Mod: CPTII,S$GLB,, | Performed by: STUDENT IN AN ORGANIZED HEALTH CARE EDUCATION/TRAINING PROGRAM

## 2025-02-24 PROCEDURE — 1160F RVW MEDS BY RX/DR IN RCRD: CPT | Mod: CPTII,S$GLB,, | Performed by: STUDENT IN AN ORGANIZED HEALTH CARE EDUCATION/TRAINING PROGRAM

## 2025-02-24 PROCEDURE — 88342 IMHCHEM/IMCYTCHM 1ST ANTB: CPT | Performed by: PATHOLOGY

## 2025-02-24 NOTE — PATIENT INSTRUCTIONS
Shave Biopsy Wound Care    Your doctor has performed a shave biopsy today.  A band aid and vaseline ointment has been placed over the site.  This should remain in place for 24 hours.  It is recommended that you keep the area dry for the first 24 hours.  After 24 hours, you may remove the band aid and wash the area with warm soap and water and apply Vaseline jelly.  Many patients prefer to use Neosporin or Bacitracin ointment.  This is acceptable; however, know that you can develop an allergy to this medication even if you have used it safely for years.  It is important to keep the area moist.  Letting it dry out and get air slows healing time, and will worsen the scar.  Band aid is optional after first 24 hours.      If you notice increasing redness, tenderness, pain, or yellow drainage at the biopsy site, please notify your doctor.  These are signs of an infection.    If your biopsy site is bleeding, apply firm pressure for 15 minutes straight.  Repeat for another 15 minutes, if it is still bleeding.   If the surgical site continues to bleed, then please contact your doctor.      [facility] [place] [address]/ [phone] FAX/ [email or web address]

## 2025-02-24 NOTE — PROGRESS NOTES
Subjective:      Patient ID:  Katerina Dickson is a 69 y.o. female who presents for   Chief Complaint   Patient presents with    Skin Cancer     Hx of NMSC     LOV 10/21/24    Patient here today for skin check UBSE  Patient states she has no new spots of concern     Path from last visit:  Final Pathologic Diagnosis   1. Skin,  Mid lower back,  shave biopsy: --> ED&C 10/21/24   - BASAL CELL CARCINOMA, SUPERFICIAL-MULTIFOCAL TYPE   - The tumor involves the deep and peripheral tissue edges.      2. Skin,  Left scaphoid of ear, shave biopsy: --> mohs Dr. K 10/23/24   - BASAL CELL CARCINOMA, NODULAR TYPE   - The tumor involves the deep and peripheral tissue edges.      3. Skin,   Right medial cheek,  shave biopsy: --> mohs Dr. K 11/13/24   - BASAL CELL CARCINOMA, NODULAR TYPE   - The tumor involves the deep tissue edge.  The peripheral tissue edge is uninvolved by tumor.    Derm Hx:  BCC, right nasal ala, mohs Dr. K, 5/2024          Review of Systems   Constitutional:  Negative for fever, chills and fatigue.   Respiratory:  Negative for cough and shortness of breath.    Gastrointestinal:  Negative for nausea, vomiting and diarrhea.   Skin:  Negative for daily sunscreen use and activity-related sunscreen use.   Hematologic/Lymphatic: Does not bruise/bleed easily.       Objective:   Physical Exam   Constitutional: She appears well-developed and well-nourished. No distress.   Neurological: She is alert and oriented to person, place, and time. She is not disoriented.   Psychiatric: She has a normal mood and affect.   Skin:   Areas Examined (abnormalities noted in diagram):   Scalp / Hair Palpated and Inspected  Head / Face Inspection Performed  Neck Inspection Performed  Chest / Axilla Inspection Performed  Abdomen Inspection Performed  Back Inspection Performed  RUE Inspected  LUE Inspection Performed  Nails and Digits Inspection Performed                     Diagram Legend     Erythematous scaling macule/papule c/w actinic  keratosis       Vascular papule c/w angioma      Pigmented verrucoid papule/plaque c/w seborrheic keratosis      Yellow umbilicated papule c/w sebaceous hyperplasia      Irregularly shaped tan macule c/w lentigo     1-2 mm smooth white papules consistent with Milia      Movable subcutaneous cyst with punctum c/w epidermal inclusion cyst      Subcutaneous movable cyst c/w pilar cyst      Firm pink to brown papule c/w dermatofibroma      Pedunculated fleshy papule(s) c/w skin tag(s)      Evenly pigmented macule c/w junctional nevus     Mildly variegated pigmented, slightly irregular-bordered macule c/w mildly atypical nevus      Flesh colored to evenly pigmented papule c/w intradermal nevus       Pink pearly papule/plaque c/w basal cell carcinoma      Erythematous hyperkeratotic cursted plaque c/w SCC      Surgical scar with no sign of skin cancer recurrence      Open and closed comedones      Inflammatory papules and pustules      Verrucoid papule consistent consistent with wart     Erythematous eczematous patches and plaques     Dystrophic onycholytic nail with subungual debris c/w onychomycosis     Umbilicated papule    Erythematous-base heme-crusted tan verrucoid plaque consistent with inflamed seborrheic keratosis     Erythematous Silvery Scaling Plaque c/w Psoriasis     See annotation      Assessment / Plan:      Pathology Orders:       Normal Orders This Visit    Specimen to Pathology, Dermatology     Questions:    Procedure Type: Dermatology and skin neoplasms    Number of Specimens: 1    ------------------------: -------------------------    Spec 1 Procedure: Biopsy    Spec 1 Clinical Impression: AK vs bcc    Spec 1 Source: right lower aspect of helical rim of ear    Release to patient:           Neoplasm of uncertain behavior of skin  -     Specimen to Pathology, Dermatology  Shave biopsy procedure note:    Shave biopsy performed after verbal consent including risk of infection, scar, recurrence, need for  additional treatment of site. Area prepped with alcohol, anesthetized with approximately 1.0cc of 1% lidocaine with epinephrine. Lesional tissue shaved with razor blade. Hemostasis achieved with application of aluminum chloride followed by hyfrecation. No complications. Dressing applied. Wound care explained.    History of nonmelanoma skin cancer  Scar  Area(s) of previous NMSC evaluated with no signs of recurrence.  Upper body skin examination performed today including at least 9 points as noted in physical examination. Suspicious lesions noted.  Patient instructed in importance in daily broad spectrum sun protection of at least spf 30. Mineral sunscreen ingredients preferred (Zinc +/- Titanium) and can be found OTC.   Patient encouraged to wear hat for all outdoor exposure.   Also discussed sun avoidance and use of protective clothing.    Vargas angioma  This is a benign vascular lesion. Reassurance given. No treatment required.     Multiple benign nevi  Careful dermoscopy evaluation of nevi performed   Monitor for new mole or moles that are becoming bigger, darker, irritated, or developing irregular borders.     Seborrheic keratosis  These are benign inherited growths without a malignant potential. Reassurance given to patient. No treatment is necessary.     Excoriation  On right side of nose w/in basal cell scar, appears to be excoriation/abrasion  If not healed in 2-3 weeks let me know  Aquaphor and avoid scrubbing the area       6 months    No follow-ups on file.

## 2025-02-27 LAB
FINAL PATHOLOGIC DIAGNOSIS: NORMAL
GROSS: NORMAL
Lab: NORMAL
MICROSCOPIC EXAM: NORMAL

## 2025-03-03 ENCOUNTER — RESULTS FOLLOW-UP (OUTPATIENT)
Dept: DERMATOLOGY | Facility: CLINIC | Age: 70
End: 2025-03-03

## 2025-03-05 ENCOUNTER — TELEPHONE (OUTPATIENT)
Dept: DERMATOLOGY | Facility: CLINIC | Age: 70
End: 2025-03-05
Payer: MEDICARE

## 2025-03-05 DIAGNOSIS — C44.212 BASAL CELL CARCINOMA (BCC) OF HELIX OF RIGHT EAR: Primary | ICD-10-CM

## 2025-03-10 ENCOUNTER — E-VISIT (OUTPATIENT)
Dept: FAMILY MEDICINE | Facility: CLINIC | Age: 70
End: 2025-03-10
Payer: MEDICARE

## 2025-03-10 ENCOUNTER — TELEPHONE (OUTPATIENT)
Dept: DERMATOLOGY | Facility: CLINIC | Age: 70
End: 2025-03-10
Payer: MEDICARE

## 2025-03-10 DIAGNOSIS — H92.01 RIGHT EAR PAIN: Primary | ICD-10-CM

## 2025-03-10 RX ORDER — AMOXICILLIN AND CLAVULANATE POTASSIUM 875; 125 MG/1; MG/1
1 TABLET, FILM COATED ORAL EVERY 12 HOURS
Qty: 20 TABLET | Refills: 0 | Status: SHIPPED | OUTPATIENT
Start: 2025-03-10

## 2025-03-10 NOTE — PROGRESS NOTES
Patient ID: Katerina Dickson is a 69 y.o. female.    Chief Complaint: General Illness (Entered automatically based on patient selection in [employer].)    The patient initiated a request through [employer] on 3/10/2025 for evaluation and management with a chief complaint of General Illness (Entered automatically based on patient selection in [employer].)     I evaluated the questionnaire submission on 3/10/2025.    [facility] P[facility] Evisit Supergroup-Common Problems    3/10/2025  9:38 AM CDT - Filed by Patient   What do you need help with? Other Concern   Do you agree to participate in an E-Visit? Yes   If you have any of the following symptoms, please go to the nearest emergency room or call 911: I acknowledge   What is the main issue you would like addressed today? Ear ache   Please describe your symptoms. Pain and pressure in my right ear and right side of face and neck. No fever or swelling in my glands   Where is your problem located? Right ear   On a scale of 1-10, where 10 is the worst you can imagine, how severe are your symptoms? (range: 1 - 10) 5   Have you had these symptoms before? No   How long have you had these symptoms? A few days   What helps with your symptoms? Heat and headache pain medicine   What makes your symptoms feel worse? Sitting up   Are your symptoms related to a condition you currently have? No   Please describe any probable cause for your symptoms. I dont know   Provide any additional information you feel is important.    Please attach any relevant images or files    Are you able to take your vital signs? Yes   Systolic Blood Pressure: 137   Diastolic Blood Pressure: 87   Weight: 150   Height: 69   Pulse: 74   Temperature:    Respiration rate:    Pulse Oxygen:          Encounter Diagnosis   Name Primary?    Right ear pain Yes        No orders of the defined types were placed in this encounter.     Medications Ordered This Encounter   Medications    amoxicillin-clavulanate 875-125mg (AUGMENTIN) 875-125 mg  per tablet     Sig: Take 1 tablet by mouth every 12 (twelve) hours.     Dispense:  20 tablet     Refill:  0        Right sided ear pain and face pain. Suspect OM and will treat with abx. If no improvement after 48 hrs on treatment then she will be seen in the clinic.     Follow up if symptoms worsen or fail to improve.      E-Visit Time Trackin minutes

## 2025-03-13 DIAGNOSIS — F33.0 MAJOR DEPRESSIVE DISORDER, RECURRENT, MILD: ICD-10-CM

## 2025-03-13 NOTE — TELEPHONE ENCOUNTER
Care Due:                  Date            Visit Type   Department     Provider  --------------------------------------------------------------------------------                                EP -                              PRIMARY      [facility]  Last Visit: 10-      CARE ([facility])   ROSI Butler                              EP -                              PRIMARY      [facility] FAMILY  Next Visit: 05-      CARE ([facility])   [facility]       Kaitlyn Butler                                                            Last  Test          Frequency    Reason                     Performed    Due Date  --------------------------------------------------------------------------------    CBC.........  12 months..  meloxicam................  04- 04-    TSH.........  12 months..  levothyroxine............  04- 04-    [facility] Embedded Care Due Messages. Reference number: [id number].   3/13/2025 11:49:39 AM CDT

## 2025-03-14 RX ORDER — BUPROPION HYDROCHLORIDE 300 MG/1
300 TABLET ORAL
Qty: 90 TABLET | Refills: 2 | Status: SHIPPED | OUTPATIENT
Start: 2025-03-14

## 2025-03-14 NOTE — TELEPHONE ENCOUNTER
Provider Staff:  Action required for this patient    Requires labs      Please see care gap opportunities below in Care Due Message.    Thanks!  [facility]     Appointments      Date Provider   Last Visit   3/10/2025 Kaitlyn Butler, DO   Next Visit   5/9/2025 Kaitlyn Butler, DO     Refill Decision Note   Katerina Yessi  is requesting a refill authorization.  Brief Assessment and Rationale for Refill:  Approve     Medication Therapy Plan:         Comments:     Note composed:2:06 PM 03/14/2025

## 2025-03-26 ENCOUNTER — HOSPITAL ENCOUNTER (OUTPATIENT)
Dept: RADIOLOGY | Facility: HOSPITAL | Age: 70
Discharge: HOME | End: 2025-03-26
Attending: INTERNAL MEDICINE
Payer: MEDICARE

## 2025-03-26 DIAGNOSIS — Z78.0 ASYMPTOMATIC MENOPAUSAL STATE: ICD-10-CM

## 2025-03-26 PROCEDURE — 77092 TBS I&R FX RSK QHP: CPT | Mod: ,,, | Performed by: RADIOLOGY

## 2025-03-26 PROCEDURE — 77080 DXA BONE DENSITY AXIAL: CPT | Mod: TC,PO

## 2025-03-26 PROCEDURE — 77080 DXA BONE DENSITY AXIAL: CPT | Mod: 26,,, | Performed by: RADIOLOGY

## 2025-03-31 ENCOUNTER — PATIENT MESSAGE (OUTPATIENT)
Dept: FAMILY MEDICINE | Facility: CLINIC | Age: 70
End: 2025-03-31
Payer: MEDICARE

## 2025-04-07 ENCOUNTER — TELEPHONE (OUTPATIENT)
Dept: DERMATOLOGY | Facility: CLINIC | Age: 70
End: 2025-04-07
Payer: MEDICARE

## 2025-04-07 NOTE — TELEPHONE ENCOUNTER
Attempted to call patient to address rescheduling Mohs surgery appointment. Left voicemail for patient to return call.

## 2025-04-14 ENCOUNTER — PROCEDURE VISIT (OUTPATIENT)
Dept: DERMATOLOGY | Facility: CLINIC | Age: 70
End: 2025-04-14
Payer: MEDICARE

## 2025-04-14 VITALS
WEIGHT: 154 LBS | SYSTOLIC BLOOD PRESSURE: 124 MMHG | HEART RATE: 67 BPM | HEIGHT: 69 IN | BODY MASS INDEX: 22.81 KG/M2 | DIASTOLIC BLOOD PRESSURE: 72 MMHG

## 2025-04-14 DIAGNOSIS — C44.212 BASAL CELL CARCINOMA (BCC) OF HELIX OF RIGHT EAR: ICD-10-CM

## 2025-04-14 PROCEDURE — 17311 MOHS 1 STAGE H/N/HF/G: CPT | Mod: 51,S$GLB,, | Performed by: DERMATOLOGY

## 2025-04-14 PROCEDURE — 14060 TIS TRNFR E/N/E/L 10 SQ CM/<: CPT | Mod: S$GLB,,, | Performed by: DERMATOLOGY

## 2025-04-14 PROCEDURE — 17312 MOHS ADDL STAGE: CPT | Mod: S$GLB,,, | Performed by: DERMATOLOGY

## 2025-04-14 RX ORDER — DOXYCYCLINE 100 MG/1
100 CAPSULE ORAL 2 TIMES DAILY
Qty: 14 CAPSULE | Refills: 0 | Status: SHIPPED | OUTPATIENT
Start: 2025-04-14 | End: 2025-04-21

## 2025-04-14 NOTE — PROGRESS NOTES
MOHS MICROGRAPHIC SURGERY OPERATIVE NOTE  Name:  Katerina Dickson  Date: 2025  Patient : 1955  Attending Surgeon: Daria Hedrick MD  Assistants: Sarah Shook - Surgical Technician  Anesthetic Agent: 1% lidocaine with 1:100,000 epinephrine  Clinical Diagnosis: basal cell carcinoma   Operation: Mohs Micrographic Surgery  Location: right lower aspect of helical rim of ear  Indications: Location in mask areas of face including central face, nose, eyelids, eyebrows, lips, chin, preauricular, temple, and ear.  Surgical Preparation: povidone-iodine  Pre-op anbitioics: no     Description of Operation:  The nature and purpose of the procedure, associated risks and alternative treatments were explained to the patient in detail. All patient questions were answered completely. An informed operative consent and photography permit were obtained. The tumor location was then identified and marked with agreement by the patient of the correct location. The patient was positioned, prepped, and draped in the usual sterile manner. Local anesthesia was obtained with 0.5 cc(s) of 1% lidocaine with 1:100,000 epinephrine. The lesion pre-operatively measures 0.7 by 0.6 cm.     1ST STAGE:  A 2+ mm rim of normal appearing skin was marked circumferentially around the lesion after scraping with a curette to define the margin. The area thus outlined was excised at a 45 degree angle. Hemostasis was obtained with electrodesiccation. The specimen was oriented, mapped, and subdivided into at least two sections. Sections were then chromacoded and submitted for horizontal frozen sections. The patient tolerated the procedure well and there were no complications. Upon microscopic examination of the processed horizontal frozen sections of this stage:  Tumor was present at the margin of the specimen; these areas of residual tumor were marked on the reference map with red pencil, pinpointing the location in which further tissue excision  was necessary.    Tumor type noted on stage 1: Superficial basal cell carcinoma: Foci of basaloid cells with peripheral palisading and focal retraction artifact arising along the dermoepidermal junction and extending into the papillary dermis.     SUBSEQUENT STAGES: The patient returned to the operating room for additional stages of tumor removal, and prepped in the manner described above. Surgery was directed to the areas having residual tumor, with thin layers of tissue being excised from these regions. A new reference map was prepared during the surgery to maintain precise orientation as described above. Hemostasis was achieved and the excised tissue was processed for microscopic analysis. These sections were then examined by the Mohs surgeon, and areas of persistent tumor were indicated on the reference map. This process was repeated until the frozen horizontal sections of STAGE 2 revealed no further tumor cells and tumor eradication was considered to be complete.  Tumor type noted on subsequent stages: No tumor seen.       SUMMARY:  The tumor was extirpated in 2 Mohs Stages resulting in a final defect measuring 1.1 by 0.5 cm².   The final defect extended deep to subcutaneous fat  The patient tolerated the procedure well and no complications were noted.     DEFECT PHOTO:      Daria Hedrick MD    FLAP CLOSURE OF MOHS DEFECT OPERATIVE REPORT  Patient Name: Katerina Dickson  Patient YOB: 1955  Date of procedure: 4/14/2025  Attending Surgeon: Daria Hedrick MD [facility]  Anesthetic Agent: 1% lidocaine with 1:100,000 epinephrine   Assistant: Sarah Shook - Surgical Technician  Clinical Diagnosis: A 1.1 x 0.5 cm² defect after Mohs Micrographic Surgery  Location: right lower aspect of helical rim of ear  Operation:  Trilobed transposition flap   Surgical Preparation: povidone-iodine    Description of Operation:  The nature and purpose of the procedure, associated risks and alternative treatments  were explained to the patient in detail. All patient questions were answered completely. In order to minimize tension on the closure and avoid compromising the anatomic contour of the anatomic region and maximize functional capacity, the above noted adjacent tissue transfer was performed.    An informed operative consent and photography permit were obtained. The patient was positioned, prepped, and draped in the usual sterile manner. Local anesthesia was obtained with 3 cc(s) of 1% lidocaine with 1:100,000 epinephrine The defect edges were debeveled with a #15 blade. Using gentian violet, the flap was designed adjacent to the defect including all anticipated dog ears. The area thus outlined was incised deep to perichondrium with the scalpel. This resulted in a final undermined and elevated flap defect measuring 2.4 x 2.3 cm².   The flap and skin margins were then undermined 50 to 75% of the defects diameter in all directions utilizing supercut iris scissors. Hemostasis was achieved with electrodessication.   The secondary defect was closed first utilizing 5-0 Monocryl interrupted buried subcutaneous vertical mattress sutures.     The adjacent tissue flap was then mobilized into place and anchored with additional 5-0 Monocryl interrupted buried subcutaneous vertical mattress sutures. The flap and recipient sites were sculpted in the adipose and dermal planes for a good fit. The skin edges were then reapposed with 6-0 Prolene. Redundant tissue was noted at the inferior and superior aspect of the adjacent tissue transfer. Burows triangles were removed utilizing a #15 blade and the epidermal edges reapposed with 6-0 Prolene. This repair resulted in excellent contour with normal symmetry. The patient tolerated the procedure well and there were no complications.   Polysporin, non-adherent gauze and a pressure dressing were applied to wound after gentle cleansing with saline.    Post op meds: Doxycycline 100 BID x 7 days      Photos:      Daria Hedrick MD [facility]

## 2025-04-22 ENCOUNTER — OFFICE VISIT (OUTPATIENT)
Dept: DERMATOLOGY | Facility: CLINIC | Age: 70
End: 2025-04-22
Payer: MEDICARE

## 2025-04-22 VITALS
TEMPERATURE: 99 F | HEART RATE: 80 BPM | RESPIRATION RATE: 16 BRPM | DIASTOLIC BLOOD PRESSURE: 76 MMHG | SYSTOLIC BLOOD PRESSURE: 133 MMHG

## 2025-04-22 DIAGNOSIS — C44.212 BASAL CELL CARCINOMA (BCC) OF RIGHT EAR: Primary | ICD-10-CM

## 2025-04-22 PROCEDURE — 99999 PR PBB SHADOW E&M-EST. PATIENT-LVL I: CPT | Mod: PBBFAC,,, | Performed by: DERMATOLOGY

## 2025-04-22 PROCEDURE — 99024 POSTOP FOLLOW-UP VISIT: CPT | Mod: S$GLB,,, | Performed by: DERMATOLOGY

## 2025-04-22 PROCEDURE — 1157F ADVNC CARE PLAN IN RCRD: CPT | Mod: CPTII,S$GLB,, | Performed by: DERMATOLOGY

## 2025-04-22 PROCEDURE — 3078F DIAST BP <80 MM HG: CPT | Mod: CPTII,S$GLB,, | Performed by: DERMATOLOGY

## 2025-04-22 PROCEDURE — 3075F SYST BP GE 130 - 139MM HG: CPT | Mod: CPTII,S$GLB,, | Performed by: DERMATOLOGY

## 2025-04-23 ENCOUNTER — PROCEDURE VISIT (OUTPATIENT)
Dept: DERMATOLOGY | Facility: CLINIC | Age: 70
End: 2025-04-23
Payer: MEDICARE

## 2025-04-23 DIAGNOSIS — L90.5 SCAR: Primary | ICD-10-CM

## 2025-04-23 PROCEDURE — 99024 POSTOP FOLLOW-UP VISIT: CPT | Mod: S$GLB,,, | Performed by: DERMATOLOGY

## 2025-04-23 NOTE — PROGRESS NOTES
Katerina Dickson  4/23/2025  Attending Surgeon: Daria Hedrick MD [facility]  Assistant: Madisyn Figueroa - Surgical Technician    Procedure Note: Dermabrasion  Preoperative Diagnosis: small area of scar tissue from previous Mohs surgery    Procedure Performed:  Spot Dermabrasion    Indications:  The patient presents with [brief description of the condition] and is undergoing dermabrasion for [e.g., cosmetic improvement, reduction of textural irregularities, treatment of actinic damage, etc.]. The risks, benefits, and limitations (R/B/L) of dermabrasion were thoroughly discussed, including the need for meticulous wound care for at least two weeks post-procedure. The patient was counseled that additional procedures may not necessarily improve cosmesis, especially if wound healing is complicated by infection, poor wound care, or delayed erythema. Informed consent was obtained.    Anesthesia:  1% lidocaine with 1:100,000 epinephrine for local anesthesia.    Procedure Details:  The treatment area was cleansed with isopropyl alcohol.  Local anesthesia was administered to ensure patient comfort.  A sterile Bovie scratch pad was used to abrade the epidermis in a controlled manner until pinpoint bleeding was observed from the papillary dermis, indicating an appropriate depth of resurfacing.  Hemostasis was achieved with aluminum chloride  Post-procedure dressing: A thin layer of ointment was applied, followed by Telfa and a bandage for protection.    Postoperative Care:  The patient was advised to:  Perform gentle wound care and keep the area covered with ointment and a bandage for at least two weeks.  Resume silicone gel sheeting after the initial healing period to optimize cosmetic outcomes.    The patient tolerated the procedure well and will follow up 6 weeks for wound assessment and further management.

## 2025-04-23 NOTE — PROGRESS NOTES
Suture Removal Note   Patient Name: Katerina Dickson  Patient : 1955  Date of Service: 2025    CC: Pt. Presents for removal of sutures on the right lower helix today  Subjective: patient reports wound healing as expected     Objective: Wound well healed, sutures clean/dry/intact. No evidence of any complications noted.   Photo:     Visit For Suture Removal:  - Suture removal today  - Steri strips/mastisol were not applied  - Patient counseled on silicone gel/silicone sheeting and their use in the management of post-operative scars  - Handout on silicone gel/silicone gel sheeting was provided  - Patient to follow up with their referring provider in 3 months for further skin evaluation    Daria Hedrick

## 2025-04-28 ENCOUNTER — LAB VISIT (OUTPATIENT)
Dept: LAB | Facility: HOSPITAL | Age: 70
End: 2025-04-28
Attending: INTERNAL MEDICINE
Payer: MEDICARE

## 2025-04-28 DIAGNOSIS — E78.5 HYPERLIPIDEMIA, UNSPECIFIED HYPERLIPIDEMIA TYPE: ICD-10-CM

## 2025-04-28 DIAGNOSIS — F33.0 MAJOR DEPRESSIVE DISORDER, RECURRENT, MILD: ICD-10-CM

## 2025-04-28 LAB
ABSOLUTE EOSINOPHIL (OHS): 0.15 K/UL
ABSOLUTE MONOCYTE (OHS): 0.42 K/UL (ref 0.3–1)
ABSOLUTE NEUTROPHIL COUNT (OHS): 2.35 K/UL (ref 1.8–7.7)
ALBUMIN SERPL BCP-MCNC: 3.7 G/DL (ref 3.5–5.2)
ALP SERPL-CCNC: 52 UNIT/L (ref 40–150)
ALT SERPL W/O P-5'-P-CCNC: 14 UNIT/L (ref 10–44)
ANION GAP (OHS): 6 MMOL/L (ref 8–16)
AST SERPL-CCNC: 17 UNIT/L (ref 11–45)
BASOPHILS # BLD AUTO: 0.05 K/UL
BASOPHILS NFR BLD AUTO: 1.1 %
BILIRUB SERPL-MCNC: 0.4 MG/DL (ref 0.1–1)
BUN SERPL-MCNC: 19 MG/DL (ref 8–23)
CALCIUM SERPL-MCNC: 9.2 MG/DL (ref 8.7–10.5)
CHLORIDE SERPL-SCNC: 107 MMOL/L (ref 95–110)
CHOLEST SERPL-MCNC: 188 MG/DL (ref 120–199)
CHOLEST/HDLC SERPL: 3.1 {RATIO} (ref 2–5)
CO2 SERPL-SCNC: 29 MMOL/L (ref 23–29)
CREAT SERPL-MCNC: 0.9 MG/DL (ref 0.5–1.4)
ERYTHROCYTE [DISTWIDTH] IN BLOOD BY AUTOMATED COUNT: 12.8 % (ref 11.5–14.5)
GFR SERPLBLD CREATININE-BSD FMLA CKD-EPI: >60 ML/MIN/1.73/M2
GLUCOSE SERPL-MCNC: 101 MG/DL (ref 70–110)
HCT VFR BLD AUTO: 42.3 % (ref 37–48.5)
HDLC SERPL-MCNC: 61 MG/DL (ref 40–75)
HDLC SERPL: 32.4 % (ref 20–50)
HGB BLD-MCNC: 13.6 GM/DL (ref 12–16)
IMM GRANULOCYTES # BLD AUTO: 0.02 K/UL (ref 0–0.04)
IMM GRANULOCYTES NFR BLD AUTO: 0.4 % (ref 0–0.5)
LDLC SERPL CALC-MCNC: 95.8 MG/DL (ref 63–159)
LYMPHOCYTES # BLD AUTO: 1.77 K/UL (ref 1–4.8)
MCH RBC QN AUTO: 32.9 PG (ref 27–31)
MCHC RBC AUTO-ENTMCNC: 32.2 G/DL (ref 32–36)
MCV RBC AUTO: 102 FL (ref 82–98)
NONHDLC SERPL-MCNC: 127 MG/DL
NUCLEATED RBC (/100WBC) (OHS): 0 /100 WBC
PLATELET # BLD AUTO: 79 K/UL (ref 150–450)
PLATELET BLD QL SMEAR: ABNORMAL
PMV BLD AUTO: 12 FL (ref 9.2–12.9)
POTASSIUM SERPL-SCNC: 4.2 MMOL/L (ref 3.5–5.1)
PROT SERPL-MCNC: 6.5 GM/DL (ref 6–8.4)
RBC # BLD AUTO: 4.14 M/UL (ref 4–5.4)
RELATIVE EOSINOPHIL (OHS): 3.2 %
RELATIVE LYMPHOCYTE (OHS): 37.2 % (ref 18–48)
RELATIVE MONOCYTE (OHS): 8.8 % (ref 4–15)
RELATIVE NEUTROPHIL (OHS): 49.3 % (ref 38–73)
SODIUM SERPL-SCNC: 142 MMOL/L (ref 136–145)
TRIGL SERPL-MCNC: 156 MG/DL (ref 30–150)
TSH SERPL-ACNC: 3.22 UIU/ML (ref 0.4–4)
WBC # BLD AUTO: 4.76 K/UL (ref 3.9–12.7)

## 2025-04-28 PROCEDURE — 85025 COMPLETE CBC W/AUTO DIFF WBC: CPT

## 2025-04-28 PROCEDURE — 36415 COLL VENOUS BLD VENIPUNCTURE: CPT | Mod: PO

## 2025-04-28 PROCEDURE — 80053 COMPREHEN METABOLIC PANEL: CPT

## 2025-04-28 PROCEDURE — 84443 ASSAY THYROID STIM HORMONE: CPT

## 2025-04-28 PROCEDURE — 80061 LIPID PANEL: CPT

## 2025-04-29 ENCOUNTER — PATIENT MESSAGE (OUTPATIENT)
Dept: FAMILY MEDICINE | Facility: CLINIC | Age: 70
End: 2025-04-29
Payer: MEDICARE

## 2025-05-09 ENCOUNTER — OFFICE VISIT (OUTPATIENT)
Dept: FAMILY MEDICINE | Facility: CLINIC | Age: 70
End: 2025-05-09
Payer: MEDICARE

## 2025-05-09 VITALS
DIASTOLIC BLOOD PRESSURE: 78 MMHG | HEART RATE: 87 BPM | WEIGHT: 154.88 LBS | TEMPERATURE: 98 F | BODY MASS INDEX: 22.94 KG/M2 | SYSTOLIC BLOOD PRESSURE: 118 MMHG | RESPIRATION RATE: 16 BRPM | HEIGHT: 69 IN | OXYGEN SATURATION: 94 %

## 2025-05-09 DIAGNOSIS — F51.01 PRIMARY INSOMNIA: ICD-10-CM

## 2025-05-09 DIAGNOSIS — Z79.890 HORMONE REPLACEMENT THERAPY (HRT): ICD-10-CM

## 2025-05-09 DIAGNOSIS — E78.5 HYPERLIPIDEMIA, UNSPECIFIED HYPERLIPIDEMIA TYPE: Primary | ICD-10-CM

## 2025-05-09 DIAGNOSIS — M70.61 TROCHANTERIC BURSITIS OF RIGHT HIP: ICD-10-CM

## 2025-05-09 DIAGNOSIS — F33.0 MAJOR DEPRESSIVE DISORDER, RECURRENT, MILD: ICD-10-CM

## 2025-05-09 DIAGNOSIS — K21.9 GASTROESOPHAGEAL REFLUX DISEASE WITHOUT ESOPHAGITIS: ICD-10-CM

## 2025-05-09 DIAGNOSIS — H69.91 CHRONIC DYSFUNCTION OF RIGHT EUSTACHIAN TUBE: ICD-10-CM

## 2025-05-09 DIAGNOSIS — E03.9 HYPOTHYROIDISM, UNSPECIFIED TYPE: ICD-10-CM

## 2025-05-09 DIAGNOSIS — M47.22 OSTEOARTHRITIS OF SPINE WITH RADICULOPATHY, CERVICAL REGION: ICD-10-CM

## 2025-05-09 DIAGNOSIS — E04.2 MULTINODULAR GOITER: ICD-10-CM

## 2025-05-09 RX ORDER — FLUTICASONE PROPIONATE 50 MCG
2 SPRAY, SUSPENSION (ML) NASAL DAILY
Qty: 16 G | Refills: 3 | Status: SHIPPED | OUTPATIENT
Start: 2025-05-09

## 2025-05-09 RX ORDER — PREDNISONE 20 MG/1
40 TABLET ORAL DAILY PRN
Qty: 12 TABLET | Refills: 0 | Status: SHIPPED | OUTPATIENT
Start: 2025-05-09 | End: 2025-05-15

## 2025-05-09 NOTE — PROGRESS NOTES
Subjective:       Patient ID: Katerina Dickson is a 69 y.o. female.    Medication List with Changes/Refills   New Medications    FLUTICASONE PROPIONATE (FLONASE) 50 MCG/ACTUATION NASAL SPRAY    2 sprays (100 mcg total) by Each Nostril route once daily.    PREDNISONE (DELTASONE) 20 MG TABLET    Take 2 tablets (40 mg total) by mouth daily as needed (ear pain).   Current Medications    ATORVASTATIN (LIPITOR) 10 MG TABLET    Take 1 tablet (10 mg total) by mouth once daily.    BIOTIN ORAL    Take by mouth Daily.    BUPROPION (WELLBUTRIN XL) 300 MG 24 HR TABLET    TAKE 1 TABLET(300 MG) BY MOUTH EVERY DAY    DOXYLAMINE SUCCINATE (UNISOM ORAL)    Take 100 mg by mouth every evening. 2 tablets nightly    ESTRADIOL (VIVELLE-DOT) 0.1 MG/24 HR PTSW    Place 1 patch onto the skin twice a week.    GABAPENTIN (NEURONTIN) 300 MG CAPSULE    Take 1 capsule (300 mg total) by mouth 2 (two) times daily.    LEVOTHYROXINE (SYNTHROID) 100 MCG TABLET    Take 1 tablet (100 mcg total) by mouth once daily.    MELOXICAM (MOBIC) 15 MG TABLET    TAKE 1 TABLET(15 MG) BY MOUTH EVERY DAY   Discontinued Medications    AMOXICILLIN-CLAVULANATE 875-125MG (AUGMENTIN) 875-125 MG PER TABLET    Take 1 tablet by mouth every 12 (twelve) hours.    PREDNISONE (DELTASONE) 20 MG TABLET    Take two tablets in am for 3 days, then one for 3 days then 1/2 for 2 days       Chief Complaint: Annual Exam  She is here today to f/u on chronic medical issues.     She has a recent right ear infection treated with antibiotics but since that time she has continued right ear fullness. No pain. No fevers. No hearing loss. No ringing.     She has hyperlipidemia and is taking atorvastatin 10 mg daily. She had myalgias with crestor. She continues to have some muscle spasms but less than on crestor. Lipids on atorvastatin on 4/2025 were 188/156/61/95.  CT calcium score on 5/2024 was 2.     She has hypothyroid and is taking synthroid 100 mcg qday. Her last TSH was normal on  4/2025.  She  had a thyroid u/s done on 4/2024 that was unchanged from previous year and no nodules that met criteria for bx.      She has GERD and is taking OTC prevacid 15 mg daily. She feels this helps with her symptoms. She had her last EGD in 2016 that showed some gastric polyps but otherwise normal.       She has depression that has been controlled since 2005 on wellbutrin 300 mg qday. She feels this is helping with her symptoms. She denies anxiety. She denies suicidal ideations.     She has insomnia for many years and is now using unisom 100 mg qhs to sleep and melatonin 5 mg nightly. She was seen by sleep medicine who advised to continue unisom and limit melatonin to less than 5 mg nightly.        She is taking HRT patches since her RUI in 1995. She is managed by gyn.        She was noted to have very low platelets that have now normalized. She was followed by hematology who feel that the platelets were artificially low due to clumping in the vial before processing. Repeat evaluation was normal.      She has fullness in her right axilla and tingling down her arm. She has EMG that showed right cervical radiculopathy.  MRI of the brachial plexus was reassuring and MRI of the cervical spine showed degenerative changes most pronounced at C5-6 and result in severe right and moderate left neural foraminal narrowing and moderate spinal canal stenosis. Mild flattening the ventral cord surface at C5-6 and C6-7.  No definite cord signal abnormality.  She reports this has improved over time and she no longer has symptoms. She continues on mobic 15 mg daily and gabapentin 300 mg bid.     She  has right hip trochanteric bursitis.  She was seen by orthopedics on 1/2025 and had steroid injection which has helped with her pain.     She has had multiple BCC on her face and is s/p Moh's surgery to both ears. She continues to follow with dermatology every 3 months.      She lives with her  and feels safe at home. She has not been  "exercising. She eats healthy.  No falls or balance issues. She is traveling to [place] next week.      Colonoscopy---6/2023 repeat in 5 years  Mammogram--1/2025 neg  DEXA-----1/2024 nl  Pap-----RUI  Tdap---3/2014  Prevnar 20----2/2023  Influenza vaccine-----10/2023  Shingrex----12/2019, 5/2020   Covid vaccine---3 doses     RSV vaccine---none     Review of Systems   Constitutional:  Negative for appetite change, fatigue, fever and unexpected weight change.   HENT:  Negative for congestion, ear pain, hearing loss, sore throat and trouble swallowing.    Eyes:  Negative for pain and visual disturbance.   Respiratory:  Negative for cough, chest tightness, shortness of breath and wheezing.    Cardiovascular:  Negative for chest pain, palpitations and leg swelling.   Gastrointestinal:  Negative for abdominal pain, blood in stool, constipation, diarrhea, nausea and vomiting.   Endocrine: Negative for polyuria.   Genitourinary:  Negative for dysuria and hematuria.   Musculoskeletal:  Negative for arthralgias, back pain and myalgias.   Skin:  Negative for rash.   Neurological:  Negative for dizziness, weakness, numbness and headaches.   Hematological:  Does not bruise/bleed easily.   Psychiatric/Behavioral:  Positive for sleep disturbance. Negative for dysphoric mood and suicidal ideas. The patient is not nervous/anxious.        Objective:      Vitals:    05/09/25 0706   BP: 118/78   BP Location: Right arm   Patient Position: Sitting   Pulse: 87   Resp: 16   Temp: 98.2 °F (36.8 °C)   TempSrc: Temporal   SpO2: (!) 94%   Weight: 70.2 kg (154 lb 14 oz)   Height: 5' 9" (1.753 m)     Body mass index is 22.87 kg/m².  Physical Exam    General appearance: No acute distress, cooperative  Eyes: PERRL, EOMI, conjunctiva clear  Ears: normal external ear and pinna, tm clear without drainage on left and bulging with clear fluid on right, canals clear  Nose: boggy erythematous mucosa on right, left clear, no drainage  Throat: no exudates " or erythema, tonsils not enlarged  Mouth: no sores or lesions, moist mucous membranes  Neck: FROM, soft, supple, no thyromegaly, no bruits  Lymph: no anterior or posterior cervical adenopathy  Heart::  Regular rate and rhythm, no murmur  Lung: Clear to ascultation bilaterally, no wheezing, no rales, no rhonchi, no distress  Abdomen: Soft, nontender, no distention, no hepatosplenomegaly, bowel sounds normal, no guarding, no rebound, no peritoneal signs  Skin: no rashes, no lesions  Extremities: no edema, no cyanosis  Neuro: CN 2-12 intact, 5/5 muscle strength upper and lower extremity bilaterally, 2+ DTRs UE and LE bilaterally, normal gait  Peripheral pulses: 2+ pedal pulses bilaterally, good perfusion and color  Musculoskeletal: FROM, good strenth, no tenderness  Joint: normal appearance, no swelling, no warmth, no deformity in all joints    Assessment:       1. Hyperlipidemia, unspecified hyperlipidemia type    2. Chronic dysfunction of right eustachian tube    3. Multinodular goiter    4. Hypothyroidism, unspecified type    5. Gastroesophageal reflux disease without esophagitis    6. Major depressive disorder, recurrent, mild    7. Primary insomnia    8. Hormone replacement therapy (HRT)    9. Trochanteric bursitis of right hip    10. Osteoarthritis of spine with radiculopathy, cervical region        Plan:       Hyperlipidemia, unspecified hyperlipidemia type  Good control on atorvastatin. Start co Q 10 to help with muscle cramps/spasms    Chronic dysfunction of right eustachian tube  Uncontrolled and will start flonase 2 SEN daily for 3 weeks. She is flying to [place] next week and given her oral steroids to use 2 days before her trip and then extra to use if she develops pain after flying. Okay to use sudafed if she develops pain in that ear.   -     fluticasone propionate (FLONASE) 50 mcg/actuation nasal spray; 2 sprays (100 mcg total) by Each Nostril route once daily.  Dispense: 16 g; Refill: 3  -      predniSONE (DELTASONE) 20 MG tablet; Take 2 tablets (40 mg total) by mouth daily as needed (ear pain).  Dispense: 12 tablet; Refill: 0    Multinodular goiter  Stable and will recheck u/s in 4/2026    Hypothyroidism, unspecified type  Good control on this dose of levothyroxine    Gastroesophageal reflux disease without esophagitis  Well controlled and continue current regimen.     Major depressive disorder, recurrent, mild  Good control on wellbutrin    Primary insomnia  Stable on unisom    Hormone replacement therapy (HRT)  Continue estrogen patch. Mammogram is UTD    Trochanteric bursitis of right hip  S/p steroid injection and she is doing well    Osteoarthritis of spine with radiculopathy, cervical region  Good control on gabapentin and mobic    Follow up in about 6 months (around 11/9/2025) for chronic medical issues.

## 2025-06-11 ENCOUNTER — OFFICE VISIT (OUTPATIENT)
Dept: DERMATOLOGY | Facility: CLINIC | Age: 70
End: 2025-06-11
Payer: MEDICARE

## 2025-06-11 DIAGNOSIS — L90.5 SCAR: Primary | ICD-10-CM

## 2025-06-11 PROCEDURE — 99024 POSTOP FOLLOW-UP VISIT: CPT | Mod: S$GLB,,, | Performed by: DERMATOLOGY

## 2025-06-11 PROCEDURE — 1157F ADVNC CARE PLAN IN RCRD: CPT | Mod: CPTII,S$GLB,, | Performed by: DERMATOLOGY

## 2025-06-11 NOTE — PROGRESS NOTES
Dermatology Outpatient Clinic Progress Note    Patient Name: Katerina Dickson  Patient : 1955  MRN: [id number]  Date of Service: 2025    Subjective:      CC/HPI: Katerina Dickson is a 69 y.o. year old female with history of basal cell carcinoma who presents today for dermabrasion follow up on her face and ears  .  Patient reports she has no current concerns .     ROS:   Dermatological ROS: positive for scar    Objective:   Physical Exam   Head           Diagram Legend     Erythematous scaling macule/papule c/w actinic keratosis       Vascular papule c/w angioma      Pigmented verrucoid papule/plaque c/w seborrheic keratosis      Yellow umbilicated papule c/w sebaceous hyperplasia      Irregularly shaped tan macule c/w lentigo     1-2 mm smooth white papules consistent with Milia      Movable subcutaneous cyst with punctum c/w epidermal inclusion cyst      Subcutaneous movable cyst c/w pilar cyst      Firm pink to brown papule c/w dermatofibroma      Pedunculated fleshy papule(s) c/w skin tag(s)      Evenly pigmented macule c/w junctional nevus     Mildly variegated pigmented, slightly irregular-bordered macule c/w mildly atypical nevus      Flesh colored to evenly pigmented papule c/w intradermal nevus       Pink pearly papule/plaque c/w basal cell carcinoma      Erythematous hyperkeratotic cursted plaque c/w SCC      Surgical scar with no sign of skin cancer recurrence      Open and closed comedones      Inflammatory papules and pustules      Verrucoid papule consistent consistent with wart     Erythematous eczematous patches and plaques     Dystrophic onycholytic nail with subungual debris c/w onychomycosis     Umbilicated papule    Erythematous-base heme-crusted tan verrucoid plaque consistent with inflamed seborrheic keratosis     Erythematous Silvery Scaling Plaque c/w Psoriasis     See annotation      Assessment / Plan:        Scar    - discussed scar massage 10x/day  - discussed should improve with time  and be barely perceptible  - cheek and ear scars nearly imperceptible  - follow up JACK Hedrick MD FAAD

## 2025-06-17 DIAGNOSIS — E78.5 HYPERLIPIDEMIA, UNSPECIFIED HYPERLIPIDEMIA TYPE: ICD-10-CM

## 2025-06-17 RX ORDER — ATORVASTATIN CALCIUM 10 MG/1
10 TABLET, FILM COATED ORAL
Qty: 90 TABLET | Refills: 3 | Status: SHIPPED | OUTPATIENT
Start: 2025-06-17

## 2025-06-17 NOTE — TELEPHONE ENCOUNTER
Refill Decision Note   Katerina Dickson  is requesting a refill authorization.  Brief Assessment and Rationale for Refill:  Approve     Medication Therapy Plan:        Comments:     Note composed:4:20 PM 06/17/2025

## 2025-06-17 NOTE — TELEPHONE ENCOUNTER
No care due was identified.  [facility] Embedded Care Due Messages. Reference number: [id number].   6/17/2025 3:53:58 PM CDT

## 2025-06-18 ENCOUNTER — PATIENT MESSAGE (OUTPATIENT)
Dept: ORTHOPEDICS | Facility: CLINIC | Age: 70
End: 2025-06-18
Payer: MEDICARE

## 2025-06-18 ENCOUNTER — E-VISIT (OUTPATIENT)
Dept: FAMILY MEDICINE | Facility: CLINIC | Age: 70
End: 2025-06-18
Payer: MEDICARE

## 2025-06-18 DIAGNOSIS — H10.32 ACUTE BACTERIAL CONJUNCTIVITIS OF LEFT EYE: Primary | ICD-10-CM

## 2025-06-18 RX ORDER — TOBRAMYCIN AND DEXAMETHASONE 3; 1 MG/ML; MG/ML
1 SUSPENSION/ DROPS OPHTHALMIC
Qty: 5 ML | Refills: 0 | Status: SHIPPED | OUTPATIENT
Start: 2025-06-18

## 2025-06-18 NOTE — PROGRESS NOTES
Patient ID: Katerina Dickson is a 69 y.o. female.        E-Visit Time Trackin minutes             Chief Complaint: General Illness (Entered automatically based on patient selection in Tapad.)        The patient initiated a request through [employer] on 2025 for evaluation and management with a chief complaint of General Illness (Entered automatically based on patient selection in Tap[employer].)     I evaluated the questionnaire submission on 2025.    [facility]-Common Problems    2025 10:19 AM CDT - Filed by Patient   What do you need help with? Red Eye   Do you agree to participate in an E-Visit? Yes   If you have any of the following symptoms, please go to the nearest Emergency Room or call 911: I acknowledge   At least one photo is required for treatment. You may upload up to three photos of the affected area.    What is the main issue you would like addressed today? Red eye, feels gritty, drainage in the morning. Had this for a week. Have been using allergy drops   Do you have any of the following eye symptoms? Redness in one eye;  Eye pain or discomfort;  Eye itching or irritation;  Eye discharge or crusting   Do you have any of the following additional symptoms? None of the above   How long have you had your eye symptoms? About a week   Do you have a fever? No   Did your symptoms begin after swimming? No   Have your eyes been exposed to any chemicals, creams, or drops that may be causing irritation? No   Have you had any recent eye injuries? No   Have you been exposed to anyone with similar symptoms? No   Do you wear contact lenses? No   Have you had any of the following conditions? None   Have you had any of the following eye conditions or treatments? Eye surgery   Please describe your past eye problem(s). Cataract surgery   What medications are you currently using for your eye symptoms? Eye drops   Provide any additional information you feel is important.    Are you able to take  your vital signs? No         Encounter Diagnosis   Name Primary?    Acute bacterial conjunctivitis of left eye Yes        No orders of the defined types were placed in this encounter.     Medications Ordered This Encounter   Medications    tobramycin-dexAMETHasone 0.3-0.1% (TOBRADEX) 0.3-0.1 % DrpS     Sig: Place 1 drop into the left eye every 4 (four) hours while awake.     Dispense:  5 mL     Refill:  0    Conjunctivitis of the left eye.     Start abx eye drops.  F/u in office if no improvement.     Follow up if symptoms worsen or fail to improve.

## 2025-06-20 DIAGNOSIS — M25.569 KNEE PAIN, UNSPECIFIED CHRONICITY, UNSPECIFIED LATERALITY: Primary | ICD-10-CM

## 2025-06-24 ENCOUNTER — HOSPITAL ENCOUNTER (OUTPATIENT)
Dept: RADIOLOGY | Facility: HOSPITAL | Age: 70
Discharge: HOME | End: 2025-06-24
Attending: NURSE PRACTITIONER
Payer: MEDICARE

## 2025-06-24 ENCOUNTER — OFFICE VISIT (OUTPATIENT)
Dept: ORTHOPEDICS | Facility: CLINIC | Age: 70
End: 2025-06-24
Payer: MEDICARE

## 2025-06-24 ENCOUNTER — TELEPHONE (OUTPATIENT)
Dept: PHARMACY | Facility: CLINIC | Age: 70
End: 2025-06-24
Payer: MEDICARE

## 2025-06-24 VITALS
DIASTOLIC BLOOD PRESSURE: 78 MMHG | HEART RATE: 87 BPM | SYSTOLIC BLOOD PRESSURE: 118 MMHG | BODY MASS INDEX: 22.92 KG/M2 | HEIGHT: 69 IN | WEIGHT: 154.75 LBS

## 2025-06-24 DIAGNOSIS — M25.569 KNEE PAIN, UNSPECIFIED CHRONICITY, UNSPECIFIED LATERALITY: ICD-10-CM

## 2025-06-24 DIAGNOSIS — M17.11 PRIMARY OSTEOARTHRITIS OF RIGHT KNEE: Primary | ICD-10-CM

## 2025-06-24 PROCEDURE — 99999 PR PBB SHADOW E&M-EST. PATIENT-LVL III: CPT | Mod: PBBFAC,,, | Performed by: NURSE PRACTITIONER

## 2025-06-24 PROCEDURE — 3074F SYST BP LT 130 MM HG: CPT | Mod: CPTII,S$GLB,, | Performed by: NURSE PRACTITIONER

## 2025-06-24 PROCEDURE — 3078F DIAST BP <80 MM HG: CPT | Mod: CPTII,S$GLB,, | Performed by: NURSE PRACTITIONER

## 2025-06-24 PROCEDURE — 1125F AMNT PAIN NOTED PAIN PRSNT: CPT | Mod: CPTII,S$GLB,, | Performed by: NURSE PRACTITIONER

## 2025-06-24 PROCEDURE — 99214 OFFICE O/P EST MOD 30 MIN: CPT | Mod: 25,S$GLB,, | Performed by: NURSE PRACTITIONER

## 2025-06-24 PROCEDURE — 1160F RVW MEDS BY RX/DR IN RCRD: CPT | Mod: CPTII,S$GLB,, | Performed by: NURSE PRACTITIONER

## 2025-06-24 PROCEDURE — 1101F PT FALLS ASSESS-DOCD LE1/YR: CPT | Mod: CPTII,S$GLB,, | Performed by: NURSE PRACTITIONER

## 2025-06-24 PROCEDURE — 73560 X-RAY EXAM OF KNEE 1 OR 2: CPT | Mod: 26,LT,, | Performed by: RADIOLOGY

## 2025-06-24 PROCEDURE — 1159F MED LIST DOCD IN RCRD: CPT | Mod: CPTII,S$GLB,, | Performed by: NURSE PRACTITIONER

## 2025-06-24 PROCEDURE — 3288F FALL RISK ASSESSMENT DOCD: CPT | Mod: CPTII,S$GLB,, | Performed by: NURSE PRACTITIONER

## 2025-06-24 PROCEDURE — 73562 X-RAY EXAM OF KNEE 3: CPT | Mod: TC,PO,RT

## 2025-06-24 PROCEDURE — 20610 DRAIN/INJ JOINT/BURSA W/O US: CPT | Mod: RT,S$GLB,, | Performed by: NURSE PRACTITIONER

## 2025-06-24 PROCEDURE — 73562 X-RAY EXAM OF KNEE 3: CPT | Mod: 26,RT,, | Performed by: RADIOLOGY

## 2025-06-24 PROCEDURE — 1157F ADVNC CARE PLAN IN RCRD: CPT | Mod: CPTII,S$GLB,, | Performed by: NURSE PRACTITIONER

## 2025-06-24 PROCEDURE — 3008F BODY MASS INDEX DOCD: CPT | Mod: CPTII,S$GLB,, | Performed by: NURSE PRACTITIONER

## 2025-06-24 RX ADMIN — TRIAMCINOLONE ACETONIDE 40 MG: 40 INJECTION, SUSPENSION INTRA-ARTICULAR; INTRAMUSCULAR at 03:06

## 2025-06-24 NOTE — PROGRESS NOTES
Chief Complaint   Patient presents with    Right Knee - Pain       HPI:   This is a 69 y.o. who presents to clinic today complaining of right knee pain for 2 weeks after no known trauma. Pain is progressively worsening. No numbness or tingling. No associated signs or symptoms.    Past Medical History:   Diagnosis Date    Basal cell carcinoma     BRCA1 negative     BRCA2 negative     GERD (gastroesophageal reflux disease)     Hashimoto's thyroiditis 2007    Hypothyroidism     Pancreatitis ,    Postmenopausal HRT (hormone replacement therapy)      Past Surgical History:   Procedure Laterality Date    CHOLECYSTECTOMY      COLONOSCOPY      every 5    COLONOSCOPY N/A 10/23/2017    Procedure: COLONOSCOPY;  Surgeon: Pankaj Shah MD;  Location: [facility] ENDO;  Service: Endoscopy;  Laterality: N/A;    COLONOSCOPY N/A 2023    Procedure: COLONOSCOPY;  Surgeon: Pankaj Shah MD;  Location: [facility] ENDO;  Service: Endoscopy;  Laterality: N/A;    ESOPHAGOGASTRODUODENOSCOPY      EYE SURGERY Bilateral     cataract    HYSTERECTOMY      heavy periods    OOPHORECTOMY       Medications Ordered Prior to Encounter[1]  Review of patient's allergies indicates:  No Known Allergies  Family History   Problem Relation Name Age of Onset    Breast cancer Mother  38         at 39 years of age    Arthritis Father      Breast cancer Sister  69    Breast cancer Maternal Grandmother      Heart disease Neg Hx       Social History[2]    Review of Systems:  Constitutional:  Denies fever or chills   Eyes:  Denies change in visual acuity   HENT:  Denies nasal congestion or sore throat   Respiratory:  Denies cough or shortness of breath   Cardiovascular:  Denies chest pain or edema   GI:  Denies abdominal pain, nausea, vomiting, bloody stools or diarrhea   :  Denies dysuria   Integument:  Denies rash   Neurologic:  Denies headache, focal weakness or sensory changes   Endocrine:  Denies polyuria or polydipsia    Lymphatic:  Denies swollen glands   Psychiatric:  Denies depression or anxiety     Physical Exam:   Constitutional:  Well developed, well nourished, no acute distress, non-toxic appearance   Integument:  Well hydrated  Neurologic:  Alert & oriented x 3  Psychiatric:  Speech and behavior appropriate     Bilateral Knee Exam    right Knee Exam     Tenderness   The patient is experiencing tenderness in the medial joint line.    Range of Motion   Extension: abnormal   Flexion: abnormal     Muscle Strength     The patient has normal knee strength.    Tests   Jaspal:  Medial - positive   Lachman:  Anterior - negative      Varus: negative  Valgus: negative  Patellar Apprehension: negative    Other   Erythema: absent  Sensation: normal  Pulse: present  Swelling: mild      left Knee Exam   left knee exam performed same as contralateral side and is normal.            X-rays were performed, personally reviewed by me and findings discussed with the patient.  3 views of the right knee show tricompartmental degenerative change most pronounced in the medial compartment with Kellgren 3 changes            Primary osteoarthritis of right knee  -     Prior authorization Order  -     Large Joint Aspiration/Injection: R knee  -     triamcinolone acetonide injection 40 mg        Using an aseptic technique, I injected 5 cc of lidocaine 1% without and 1 cc of kenalog 40mg into the right Knee. The patient tolerated this well. I will have them return to clinic as scheduled.         [1]   Current Outpatient Medications on File Prior to Visit   Medication Sig Dispense Refill    atorvastatin (LIPITOR) 10 MG tablet TAKE 1 TABLET(10 MG) BY MOUTH DAILY 90 tablet 3    BIOTIN ORAL Take by mouth Daily.      buPROPion (WELLBUTRIN XL) 300 MG 24 hr tablet TAKE 1 TABLET(300 MG) BY MOUTH EVERY DAY 90 tablet 2    DOXYLAMINE SUCCINATE (UNISOM ORAL) Take 100 mg by mouth every evening. 2 tablets nightly      estradioL (VIVELLE-DOT) 0.1 mg/24 hr PTSW Place  1 patch onto the skin twice a week. 24 patch 3    fluticasone propionate (FLONASE) 50 mcg/actuation nasal spray 2 sprays (100 mcg total) by Each Nostril route once daily. 16 g 3    gabapentin (NEURONTIN) 300 MG capsule Take 1 capsule (300 mg total) by mouth 2 (two) times daily. 180 capsule 2    levothyroxine (SYNTHROID) 100 MCG tablet Take 1 tablet (100 mcg total) by mouth once daily. 90 tablet 3    meloxicam (MOBIC) 15 MG tablet TAKE 1 TABLET(15 MG) BY MOUTH EVERY DAY 90 tablet 3    tobramycin-dexAMETHasone 0.3-0.1% (TOBRADEX) 0.3-0.1 % DrpS Place 1 drop into the left eye every 4 (four) hours while awake. 5 mL 0     No current facility-administered medications on file prior to visit.   [2]   Social History  Socioeconomic History    Marital status:    Tobacco Use    Smoking status: Former     Current packs/day: 0.00     Types: Cigarettes     Quit date: 1995     Years since quittin.8    Smokeless tobacco: Never   Substance and Sexual Activity    Alcohol use: Yes     Alcohol/week: 9.0 standard drinks of alcohol     Types: 1 Glasses of wine, 1 Shots of liquor, 7 Standard drinks or equivalent per week     Comment: 2-3 drinks per day    Drug use: No    Sexual activity: Yes     Partners: Male     Birth control/protection: Surgical     Social Drivers of Health     Financial Resource Strain: Patient Declined (2024)    Overall Financial Resource Strain (CARDIA)     Difficulty of Paying Living Expenses: Patient declined   Food Insecurity: Patient Declined (2024)    Hunger Vital Sign     Worried About Running Out of Food in the Last Year: Patient declined     Ran Out of Food in the Last Year: Patient declined   Physical Activity: Unknown (2024)    Exercise Vital Sign     Days of Exercise per Week: Patient declined   Housing Stability: Unknown (2024)    Housing Stability Vital Sign     Unable to Pay for Housing in the Last Year: Patient declined

## 2025-06-24 NOTE — TELEPHONE ENCOUNTER
[facility]/Population Health Chart Review & Patient Outreach Details For Medication Adherence Project    Reason for Outreach Encounter: 3rd Party payor non-compliance report ([employer], [employer], C, etc)  2.  Patient Outreach Method: Reviewed patient chart   3.   Medication in question:    Hyperlipidemia Medications              atorvastatin (LIPITOR) 10 MG tablet TAKE 1 TABLET(10 MG) BY MOUTH DAILY               LF 90 ds 5/9/25    4.  Reviewed and or Updates Made To: Patient Chart  5. Outreach Outcomes and/or actions taken: Patient filled medication and is on track to be adherent  Additional Notes:

## 2025-07-02 RX ORDER — TRIAMCINOLONE ACETONIDE 40 MG/ML
40 INJECTION, SUSPENSION INTRA-ARTICULAR; INTRAMUSCULAR
Status: DISCONTINUED | OUTPATIENT
Start: 2025-06-24 | End: 2025-07-02 | Stop reason: HOSPADM

## 2025-07-02 NOTE — PROCEDURES
Large Joint Aspiration/Injection: R knee    Date/Time: 6/24/2025 3:20 PM    Performed by: Xi Castro FNP  Authorized by: Xi Castro FNP    Consent Done?:  Yes (Verbal)  Indications:  Pain  Timeout: prior to procedure the correct patient, procedure, and site was verified    Prep: patient was prepped and draped in usual sterile fashion    Local anesthetic:  Lidocaine 1% without epinephrine  Anesthetic total (ml):  5      Details:  Needle Size:  21 G  Approach:  Anterolateral  Location:  Knee  Site:  R knee  Medications:  40 mg triamcinolone acetonide 40 mg/mL  Patient tolerance:  Patient tolerated the procedure well with no immediate complications

## 2025-08-20 ENCOUNTER — PATIENT MESSAGE (OUTPATIENT)
Dept: ORTHOPEDICS | Facility: CLINIC | Age: 70
End: 2025-08-20
Payer: MEDICARE

## 2025-08-25 ENCOUNTER — OFFICE VISIT (OUTPATIENT)
Dept: DERMATOLOGY | Facility: CLINIC | Age: 70
End: 2025-08-25
Payer: MEDICARE

## 2025-08-25 DIAGNOSIS — L90.5 SCAR: ICD-10-CM

## 2025-08-25 DIAGNOSIS — D48.5 NEOPLASM OF UNCERTAIN BEHAVIOR OF SKIN: Primary | ICD-10-CM

## 2025-08-25 DIAGNOSIS — D22.9 MULTIPLE BENIGN NEVI: ICD-10-CM

## 2025-08-25 DIAGNOSIS — D18.01 CHERRY ANGIOMA: ICD-10-CM

## 2025-08-25 DIAGNOSIS — Z85.828 HISTORY OF NONMELANOMA SKIN CANCER: ICD-10-CM

## 2025-08-25 DIAGNOSIS — L82.1 SEBORRHEIC KERATOSIS: ICD-10-CM

## 2025-08-25 PROCEDURE — 1160F RVW MEDS BY RX/DR IN RCRD: CPT | Mod: CPTII,S$GLB,, | Performed by: STUDENT IN AN ORGANIZED HEALTH CARE EDUCATION/TRAINING PROGRAM

## 2025-08-25 PROCEDURE — 1159F MED LIST DOCD IN RCRD: CPT | Mod: CPTII,S$GLB,, | Performed by: STUDENT IN AN ORGANIZED HEALTH CARE EDUCATION/TRAINING PROGRAM

## 2025-08-25 PROCEDURE — 1157F ADVNC CARE PLAN IN RCRD: CPT | Mod: CPTII,S$GLB,, | Performed by: STUDENT IN AN ORGANIZED HEALTH CARE EDUCATION/TRAINING PROGRAM

## 2025-08-25 PROCEDURE — 1101F PT FALLS ASSESS-DOCD LE1/YR: CPT | Mod: CPTII,S$GLB,, | Performed by: STUDENT IN AN ORGANIZED HEALTH CARE EDUCATION/TRAINING PROGRAM

## 2025-08-25 PROCEDURE — 99213 OFFICE O/P EST LOW 20 MIN: CPT | Mod: S$GLB,,, | Performed by: STUDENT IN AN ORGANIZED HEALTH CARE EDUCATION/TRAINING PROGRAM

## 2025-08-25 PROCEDURE — 1126F AMNT PAIN NOTED NONE PRSNT: CPT | Mod: CPTII,S$GLB,, | Performed by: STUDENT IN AN ORGANIZED HEALTH CARE EDUCATION/TRAINING PROGRAM

## 2025-08-25 PROCEDURE — 3288F FALL RISK ASSESSMENT DOCD: CPT | Mod: CPTII,S$GLB,, | Performed by: STUDENT IN AN ORGANIZED HEALTH CARE EDUCATION/TRAINING PROGRAM

## 2025-08-26 ENCOUNTER — OFFICE VISIT (OUTPATIENT)
Dept: ORTHOPEDICS | Facility: CLINIC | Age: 70
End: 2025-08-26
Payer: MEDICARE

## 2025-08-26 VITALS
HEART RATE: 87 BPM | SYSTOLIC BLOOD PRESSURE: 118 MMHG | WEIGHT: 154.75 LBS | HEIGHT: 69 IN | BODY MASS INDEX: 22.92 KG/M2 | DIASTOLIC BLOOD PRESSURE: 78 MMHG

## 2025-08-26 DIAGNOSIS — M17.11 PRIMARY OSTEOARTHRITIS OF RIGHT KNEE: Primary | ICD-10-CM

## 2025-08-26 DIAGNOSIS — M70.61 GREATER TROCHANTERIC BURSITIS OF RIGHT HIP: ICD-10-CM

## 2025-08-26 PROCEDURE — 3078F DIAST BP <80 MM HG: CPT | Mod: CPTII,S$GLB,, | Performed by: NURSE PRACTITIONER

## 2025-08-26 PROCEDURE — 20610 DRAIN/INJ JOINT/BURSA W/O US: CPT | Mod: RT,S$GLB,, | Performed by: NURSE PRACTITIONER

## 2025-08-26 PROCEDURE — 1101F PT FALLS ASSESS-DOCD LE1/YR: CPT | Mod: CPTII,S$GLB,, | Performed by: NURSE PRACTITIONER

## 2025-08-26 PROCEDURE — 3008F BODY MASS INDEX DOCD: CPT | Mod: CPTII,S$GLB,, | Performed by: NURSE PRACTITIONER

## 2025-08-26 PROCEDURE — 20610 DRAIN/INJ JOINT/BURSA W/O US: CPT | Mod: 51,XS,RT,S$GLB | Performed by: NURSE PRACTITIONER

## 2025-08-26 PROCEDURE — 1157F ADVNC CARE PLAN IN RCRD: CPT | Mod: CPTII,S$GLB,, | Performed by: NURSE PRACTITIONER

## 2025-08-26 PROCEDURE — 3288F FALL RISK ASSESSMENT DOCD: CPT | Mod: CPTII,S$GLB,, | Performed by: NURSE PRACTITIONER

## 2025-08-26 PROCEDURE — 1159F MED LIST DOCD IN RCRD: CPT | Mod: CPTII,S$GLB,, | Performed by: NURSE PRACTITIONER

## 2025-08-26 PROCEDURE — 99214 OFFICE O/P EST MOD 30 MIN: CPT | Mod: 25,S$GLB,, | Performed by: NURSE PRACTITIONER

## 2025-08-26 PROCEDURE — 99999 PR PBB SHADOW E&M-EST. PATIENT-LVL III: CPT | Mod: PBBFAC,,, | Performed by: NURSE PRACTITIONER

## 2025-08-26 PROCEDURE — 3074F SYST BP LT 130 MM HG: CPT | Mod: CPTII,S$GLB,, | Performed by: NURSE PRACTITIONER

## 2025-08-26 PROCEDURE — 1160F RVW MEDS BY RX/DR IN RCRD: CPT | Mod: CPTII,S$GLB,, | Performed by: NURSE PRACTITIONER

## 2025-08-26 RX ORDER — TRIAMCINOLONE ACETONIDE 40 MG/ML
40 INJECTION, SUSPENSION INTRA-ARTICULAR; INTRAMUSCULAR
Status: DISCONTINUED | OUTPATIENT
Start: 2025-08-26 | End: 2025-08-26 | Stop reason: HOSPADM

## 2025-08-26 RX ADMIN — TRIAMCINOLONE ACETONIDE 40 MG: 40 INJECTION, SUSPENSION INTRA-ARTICULAR; INTRAMUSCULAR at 08:08
